# Patient Record
Sex: MALE | Race: WHITE | NOT HISPANIC OR LATINO | Employment: OTHER | URBAN - NONMETROPOLITAN AREA
[De-identification: names, ages, dates, MRNs, and addresses within clinical notes are randomized per-mention and may not be internally consistent; named-entity substitution may affect disease eponyms.]

---

## 2022-04-01 ENCOUNTER — TELEPHONE (OUTPATIENT)
Dept: FAMILY MEDICINE CLINIC | Facility: CLINIC | Age: 77
End: 2022-04-01

## 2022-04-01 NOTE — TELEPHONE ENCOUNTER
I looked in [facility] he was not seen for a sinus infection at that time. He will need to make an appointment thank you

## 2022-04-01 NOTE — TELEPHONE ENCOUNTER
I contacted patient and advised him that he needed to be seen before medication would be sent in.   He said he would try to come to the office tomorrow for the walk in clinic

## 2022-04-01 NOTE — TELEPHONE ENCOUNTER
Patient is calling requesting an antibiotic be sent in for a sinus infection, patient states he was in office beginning of February and saw Dr. Olivarez for the same issue.     Patient pharmacy is [facility].

## 2022-04-02 ENCOUNTER — OFFICE VISIT (OUTPATIENT)
Dept: FAMILY MEDICINE CLINIC | Facility: CLINIC | Age: 77
End: 2022-04-02

## 2022-04-02 VITALS
OXYGEN SATURATION: 97 % | BODY MASS INDEX: 37.64 KG/M2 | HEART RATE: 60 BPM | HEIGHT: 73 IN | SYSTOLIC BLOOD PRESSURE: 134 MMHG | DIASTOLIC BLOOD PRESSURE: 70 MMHG | WEIGHT: 284 LBS

## 2022-04-02 DIAGNOSIS — J01.10 SUBACUTE FRONTAL SINUSITIS: Primary | ICD-10-CM

## 2022-04-02 DIAGNOSIS — R05.9 COUGH: ICD-10-CM

## 2022-04-02 PROCEDURE — 99213 OFFICE O/P EST LOW 20 MIN: CPT | Performed by: FAMILY MEDICINE

## 2022-04-02 RX ORDER — ICOSAPENT ETHYL 1000 MG/1
2 CAPSULE ORAL 2 TIMES DAILY
COMMUNITY
Start: 2022-03-10 | End: 2022-10-05 | Stop reason: SDUPTHER

## 2022-04-02 RX ORDER — DOXYCYCLINE 100 MG/1
100 CAPSULE ORAL 2 TIMES DAILY
Qty: 20 CAPSULE | Refills: 0 | Status: SHIPPED | OUTPATIENT
Start: 2022-04-02 | End: 2022-10-05

## 2022-04-02 RX ORDER — DEXTROMETHORPHAN HYDROBROMIDE AND PROMETHAZINE HYDROCHLORIDE 15; 6.25 MG/5ML; MG/5ML
5 SOLUTION ORAL 4 TIMES DAILY PRN
Qty: 150 ML | Refills: 2 | Status: SHIPPED | OUTPATIENT
Start: 2022-04-02 | End: 2022-10-05

## 2022-04-02 RX ORDER — CARVEDILOL 25 MG/1
1 TABLET ORAL 2 TIMES DAILY
COMMUNITY
Start: 2022-03-28

## 2022-04-02 RX ORDER — ROSUVASTATIN CALCIUM 10 MG/1
1 TABLET, COATED ORAL DAILY
COMMUNITY
Start: 2022-02-01 | End: 2022-09-23 | Stop reason: SDUPTHER

## 2022-04-02 RX ORDER — METHYLPREDNISOLONE 4 MG/1
TABLET ORAL
Qty: 19 TABLET | Refills: 0 | Status: SHIPPED | OUTPATIENT
Start: 2022-04-02 | End: 2022-04-12

## 2022-04-02 RX ORDER — LOSARTAN POTASSIUM 100 MG/1
1 TABLET ORAL DAILY
COMMUNITY
Start: 2022-03-28

## 2022-04-02 NOTE — PROGRESS NOTES
"Chief Complaint  Cough (Patient reports having cough and congestion x2 months.)    Subjective          Werner Brower presents to [facility]  Patient reports he had a cough for about 6 weeks.  He was recently patient treated with some antibiotics and he states he did not get better.  He states if anything his cough is actually got worse and states that he is having now some wheezing with it as well it is really worse when he lays down at night at times or when he exerts himself.      Objective   Vital Signs:   /70 (BP Location: Left arm, Patient Position: Sitting, Cuff Size: Adult)   Pulse 60   Ht 185.4 cm (73\")   Wt 129 kg (284 lb)   SpO2 97%   BMI 37.47 kg/m²     Body mass index is 37.47 kg/m².    Review of Systems   HENT: Positive for rhinorrhea and sinus pressure.    Respiratory: Positive for cough and wheezing.        Past History:  Medical History: has no past medical history on file.   Surgical History: has no past surgical history on file.         Current Outpatient Medications:   •  rosuvastatin (CRESTOR) 10 MG tablet, Take 1 tablet by mouth Daily., Disp: , Rfl:   •  carvedilol (COREG) 25 MG tablet, Take 2 tablets by mouth 2 (Two) Times a Day., Disp: , Rfl:   •  cyanocobalamin (VITAMIN B-12) 1000 MCG tablet, Take 1 tablet by mouth Daily., Disp: , Rfl:   •  doxycycline (MONODOX) 100 MG capsule, Take 1 capsule by mouth 2 (Two) Times a Day., Disp: 20 capsule, Rfl: 0  •  losartan (COZAAR) 100 MG tablet, Take 1 tablet by mouth Daily., Disp: , Rfl:   •  methylPREDNISolone (MEDROL) 4 MG tablet, Take 3 tablets by mouth Daily for 3 days, THEN 2 tablets Daily for 3 days, THEN 1 tablet Daily for 4 days., Disp: 19 tablet, Rfl: 0  •  promethazine-dextromethorphan (PROMETHAZINE-DM) 6.25-15 MG/5ML solution, Take 5 mL by mouth 4 (Four) Times a Day As Needed for Cough., Disp: 150 mL, Rfl: 2  •  Vascepa 1 g capsule capsule, Take 2 capsules by mouth 2 (Two) Times a Day., Disp: , " Rfl:     Allergies: Patient has no known allergies.    Physical Exam  Vitals reviewed.   Constitutional:       Appearance: Normal appearance.   HENT:      Head: Normocephalic.      Comments: Patient has cobblestoning and irritation to the throat     Right Ear: Tympanic membrane, ear canal and external ear normal.      Left Ear: Tympanic membrane, ear canal and external ear normal.      Nose: Nose normal.      Mouth/Throat:      Pharynx: Oropharynx is clear.   Eyes:      Pupils: Pupils are equal, round, and reactive to light.   Cardiovascular:      Rate and Rhythm: Normal rate and regular rhythm.      Pulses: Normal pulses.   Pulmonary:      Effort: Pulmonary effort is normal.      Breath sounds: Normal breath sounds.      Comments: Patient has wheeze and a croupy cough  Abdominal:      General: Abdomen is flat. Bowel sounds are normal.      Palpations: Abdomen is soft.   Musculoskeletal:         General: Normal range of motion.   Skin:     General: Skin is warm and dry.   Neurological:      General: No focal deficit present.      Mental Status: He is alert and oriented to person, place, and time.          Result Review :                   Assessment and Plan    Diagnoses and all orders for this visit:    1. Subacute frontal sinusitis (Primary)  Comments:  Will start doxycycline Medrol taper and Promethazine DM cough medications.  Monitor if he gets worse return  Orders:  -     methylPREDNISolone (MEDROL) 4 MG tablet; Take 3 tablets by mouth Daily for 3 days, THEN 2 tablets Daily for 3 days, THEN 1 tablet Daily for 4 days.  Dispense: 19 tablet; Refill: 0  -     doxycycline (MONODOX) 100 MG capsule; Take 1 capsule by mouth 2 (Two) Times a Day.  Dispense: 20 capsule; Refill: 0    2. Cough  Comments:  Reports he has little reactive airway disease sometimes from allergies in the spring and when he gets sick.  We will start his present medications and monitor   Orders:  -     promethazine-dextromethorphan  (PROMETHAZINE-DM) 6.25-15 MG/5ML solution; Take 5 mL by mouth 4 (Four) Times a Day As Needed for Cough.  Dispense: 150 mL; Refill: 2              Follow Up   No follow-ups on file.  Patient was given instructions and counseling regarding his condition or for health maintenance advice. Please see specific information pulled into the AVS if appropriate.     Enzo Garcia MD

## 2022-08-11 RX ORDER — ICOSAPENT ETHYL 1000 MG/1
CAPSULE ORAL
Qty: 120 CAPSULE | Refills: 5 | OUTPATIENT
Start: 2022-08-11

## 2022-09-23 RX ORDER — ROSUVASTATIN CALCIUM 10 MG/1
10 TABLET, COATED ORAL DAILY
Qty: 30 TABLET | Refills: 1 | Status: SHIPPED | OUTPATIENT
Start: 2022-09-23 | End: 2022-10-05 | Stop reason: SDUPTHER

## 2022-09-23 NOTE — TELEPHONE ENCOUNTER
Caller: Werner Brower    Relationship: Self    Best call back number: [phone]    Requested Prescriptions:   Requested Prescriptions     Pending Prescriptions Disp Refills   • rosuvastatin (CRESTOR) 10 MG tablet 90 tablet      Sig: Take 1 tablet by mouth Daily.      Pharmacy where request should be sent: [facility]/PHARMACY #[id number] [facility] [address] - [phone]  - [phone]     Does the patient have less than a 3 day supply:  [] Yes  [x] No    Driss Dick Rep   09/23/22 10:24 EDT

## 2022-10-05 ENCOUNTER — OFFICE VISIT (OUTPATIENT)
Dept: FAMILY MEDICINE CLINIC | Facility: CLINIC | Age: 77
End: 2022-10-05

## 2022-10-05 VITALS
DIASTOLIC BLOOD PRESSURE: 60 MMHG | WEIGHT: 264.9 LBS | HEART RATE: 64 BPM | BODY MASS INDEX: 35.11 KG/M2 | HEIGHT: 73 IN | SYSTOLIC BLOOD PRESSURE: 156 MMHG | OXYGEN SATURATION: 98 %

## 2022-10-05 DIAGNOSIS — I10 ESSENTIAL HYPERTENSION: Primary | ICD-10-CM

## 2022-10-05 DIAGNOSIS — E78.2 MIXED HYPERLIPIDEMIA: ICD-10-CM

## 2022-10-05 PROBLEM — Z79.899 HIGH RISK MEDICATION USE: Status: ACTIVE | Noted: 2022-10-05

## 2022-10-05 PROBLEM — Z98.890 HISTORY OF COLONOSCOPY: Status: ACTIVE | Noted: 2022-10-05

## 2022-10-05 PROBLEM — M17.0 PRIMARY OSTEOARTHRITIS OF BOTH KNEES: Status: ACTIVE | Noted: 2022-10-05

## 2022-10-05 PROBLEM — N40.1 BPH ASSOCIATED WITH NOCTURIA: Status: ACTIVE | Noted: 2022-10-05

## 2022-10-05 PROBLEM — R35.1 BPH ASSOCIATED WITH NOCTURIA: Status: ACTIVE | Noted: 2022-10-05

## 2022-10-05 PROCEDURE — 99214 OFFICE O/P EST MOD 30 MIN: CPT | Performed by: PHYSICIAN ASSISTANT

## 2022-10-05 PROCEDURE — 36415 COLL VENOUS BLD VENIPUNCTURE: CPT | Performed by: PHYSICIAN ASSISTANT

## 2022-10-05 RX ORDER — ROSUVASTATIN CALCIUM 10 MG/1
10 TABLET, COATED ORAL DAILY
Qty: 30 TABLET | Refills: 5 | Status: SHIPPED | OUTPATIENT
Start: 2022-10-05 | End: 2023-01-10 | Stop reason: SDUPTHER

## 2022-10-05 RX ORDER — ICOSAPENT ETHYL 1000 MG/1
2 CAPSULE ORAL 2 TIMES DAILY
Qty: 120 CAPSULE | Refills: 5 | Status: SHIPPED | OUTPATIENT
Start: 2022-10-05 | End: 2023-01-10 | Stop reason: SDUPTHER

## 2022-10-05 NOTE — PROGRESS NOTES
"Chief Complaint  Med Refill (Needs cholesterol meds refilled. )    Subjective          Werner Brower presents to [facility]  History of Present Illness   comes in today needing refills of his cholesterol medications and his \"routine blood work.\"  He states that he is feeling fine has no specific complaints today.    Objective   Vital Signs:   /60 (BP Location: Right arm)   Pulse 64   Ht 185.4 cm (73\")   Wt 120 kg (264 lb 14.4 oz)   SpO2 98%   BMI 34.95 kg/m²     Body mass index is 34.95 kg/m².    Review of Systems   Constitutional: Negative.  Negative for fatigue.   HENT: Negative.    Eyes: Negative.  Negative for blurred vision.   Respiratory: Negative.  Negative for cough, chest tightness and shortness of breath.    Cardiovascular: Negative.  Negative for chest pain, palpitations and leg swelling.   Gastrointestinal: Negative.  Negative for abdominal pain, constipation, diarrhea, nausea and vomiting.   Endocrine: Negative.    Genitourinary: Negative.    Musculoskeletal: Negative.    Skin: Negative.    Allergic/Immunologic: Negative.    Neurological: Negative.  Negative for dizziness, tremors and headache.   Hematological: Negative.    Psychiatric/Behavioral: Negative.  Negative for depressed mood. The patient is not nervous/anxious.        Past History:  Medical History: has a past medical history of Arthritis, Back pain, BPH associated with nocturia, Condition not found, Essential hypertension, Gallbladder problem, Gonarthrosis, H/O colonoscopy, High risk medication use, History of circumcision, Migraine headache, and Skin problem.   Surgical History: has a past surgical history that includes Cholecystectomy and Colonoscopy (02/24/2010).   Family History: family history includes Hearing loss in his father and mother.   Social History: reports that he has never smoked. He has never used smokeless tobacco. He reports that he does not drink alcohol and does not use " drugs.      Current Outpatient Medications:   •  carvedilol (COREG) 25 MG tablet, Take 1 tablet by mouth 2 (Two) Times a Day., Disp: , Rfl:   •  cyanocobalamin (VITAMIN B-12) 1000 MCG tablet, Take 1 tablet by mouth Daily., Disp: , Rfl:   •  losartan (COZAAR) 100 MG tablet, Take 1 tablet by mouth Daily., Disp: , Rfl:   •  rosuvastatin (CRESTOR) 10 MG tablet, Take 1 tablet by mouth Daily., Disp: 30 tablet, Rfl: 5  •  Vascepa 1 g capsule capsule, Take 2 g by mouth 2 (Two) Times a Day. Dispense as written for severe hypertriglyceridemia, Disp: 120 capsule, Rfl: 5    Allergies: Patient has no known allergies.    Physical Exam  Vitals reviewed.   Constitutional:       Appearance: Normal appearance.   Cardiovascular:      Rate and Rhythm: Normal rate and regular rhythm.      Heart sounds: Normal heart sounds.   Pulmonary:      Effort: Pulmonary effort is normal.      Breath sounds: Normal breath sounds.   Abdominal:      General: Bowel sounds are normal.      Palpations: Abdomen is soft.   Musculoskeletal:         General: Normal range of motion.   Neurological:      General: No focal deficit present.      Mental Status: He is alert and oriented to person, place, and time.   Psychiatric:         Mood and Affect: Mood normal.          Result Review :                   Assessment and Plan    Diagnoses and all orders for this visit:    1. Essential hypertension (Primary)  Assessment & Plan:   Hypertension is well controlled, his cardiologist refills these meds, no changes. Routine screening labs ordered. Further recommendations will depend upon those results.     Orders:  -     CBC & Differential; Future  -     Comprehensive Metabolic Panel; Future  -     CBC & Differential  -     Comprehensive Metabolic Panel    2. Mixed hyperlipidemia  Assessment & Plan:  Continue present care no changes meds refilled.Routine screening labs ordered. Further recommendations will depend upon those results.     Orders:  -     Lipid Panel;  Future  -     TSH; Future  -     Lipid Panel  -     TSH    Other orders  -     Vascepa 1 g capsule capsule; Take 2 g by mouth 2 (Two) Times a Day. Dispense as written for severe hypertriglyceridemia  Dispense: 120 capsule; Refill: 5  -     rosuvastatin (CRESTOR) 10 MG tablet; Take 1 tablet by mouth Daily.  Dispense: 30 tablet; Refill: 5      Follow Up   Return in about 6 months (around 4/5/2023) for Recheck.  Patient was given instructions and counseling regarding his condition or for health maintenance advice. Please see specific information pulled into the AVS if appropriate.     Missy Sylvester PA-C

## 2022-10-06 LAB
ALBUMIN SERPL-MCNC: 4.3 G/DL (ref 3.7–4.7)
ALBUMIN/GLOB SERPL: 1.5 {RATIO} (ref 1.2–2.2)
ALP SERPL-CCNC: 56 IU/L (ref 44–121)
ALT SERPL-CCNC: 20 IU/L (ref 0–44)
AST SERPL-CCNC: 19 IU/L (ref 0–40)
BASOPHILS # BLD AUTO: 0.1 X10E3/UL (ref 0–0.2)
BASOPHILS NFR BLD AUTO: 1 %
BILIRUB SERPL-MCNC: 0.4 MG/DL (ref 0–1.2)
BUN SERPL-MCNC: 20 MG/DL (ref 8–27)
BUN/CREAT SERPL: 18 (ref 10–24)
CALCIUM SERPL-MCNC: 9.5 MG/DL (ref 8.6–10.2)
CHLORIDE SERPL-SCNC: 101 MMOL/L (ref 96–106)
CHOLEST SERPL-MCNC: 113 MG/DL (ref 100–199)
CO2 SERPL-SCNC: 23 MMOL/L (ref 20–29)
CREAT SERPL-MCNC: 1.14 MG/DL (ref 0.76–1.27)
EGFRCR SERPLBLD CKD-EPI 2021: 67 ML/MIN/1.73
EOSINOPHIL # BLD AUTO: 0.5 X10E3/UL (ref 0–0.4)
EOSINOPHIL NFR BLD AUTO: 6 %
ERYTHROCYTE [DISTWIDTH] IN BLOOD BY AUTOMATED COUNT: 12.8 % (ref 11.6–15.4)
GLOBULIN SER CALC-MCNC: 2.9 G/DL (ref 1.5–4.5)
GLUCOSE SERPL-MCNC: 154 MG/DL (ref 70–99)
HCT VFR BLD AUTO: 37.7 % (ref 37.5–51)
HDLC SERPL-MCNC: 37 MG/DL
HGB BLD-MCNC: 13.3 G/DL (ref 13–17.7)
IMM GRANULOCYTES # BLD AUTO: 0 X10E3/UL (ref 0–0.1)
IMM GRANULOCYTES NFR BLD AUTO: 0 %
LDLC SERPL CALC-MCNC: 52 MG/DL (ref 0–99)
LYMPHOCYTES # BLD AUTO: 3.1 X10E3/UL (ref 0.7–3.1)
LYMPHOCYTES NFR BLD AUTO: 38 %
MCH RBC QN AUTO: 33 PG (ref 26.6–33)
MCHC RBC AUTO-ENTMCNC: 35.3 G/DL (ref 31.5–35.7)
MCV RBC AUTO: 94 FL (ref 79–97)
MONOCYTES # BLD AUTO: 0.9 X10E3/UL (ref 0.1–0.9)
MONOCYTES NFR BLD AUTO: 11 %
NEUTROPHILS # BLD AUTO: 3.5 X10E3/UL (ref 1.4–7)
NEUTROPHILS NFR BLD AUTO: 44 %
PLATELET # BLD AUTO: 356 X10E3/UL (ref 150–450)
POTASSIUM SERPL-SCNC: 4.5 MMOL/L (ref 3.5–5.2)
PROT SERPL-MCNC: 7.2 G/DL (ref 6–8.5)
RBC # BLD AUTO: 4.03 X10E6/UL (ref 4.14–5.8)
SODIUM SERPL-SCNC: 137 MMOL/L (ref 134–144)
TRIGL SERPL-MCNC: 140 MG/DL (ref 0–149)
TSH SERPL DL<=0.005 MIU/L-ACNC: 1.95 UIU/ML (ref 0.45–4.5)
VLDLC SERPL CALC-MCNC: 24 MG/DL (ref 5–40)
WBC # BLD AUTO: 8 X10E3/UL (ref 3.4–10.8)

## 2022-10-10 ENCOUNTER — TELEPHONE (OUTPATIENT)
Dept: FAMILY MEDICINE CLINIC | Facility: CLINIC | Age: 77
End: 2022-10-10

## 2022-10-10 NOTE — PROGRESS NOTES
Tried to call Pt, unable to reach him or leave VM bc his VM has not been set up. If pt calls back please give message below. OK for HUB to read

## 2022-11-28 ENCOUNTER — TRANSITIONAL CARE MANAGEMENT TELEPHONE ENCOUNTER (OUTPATIENT)
Dept: CALL CENTER | Facility: HOSPITAL | Age: 77
End: 2022-11-28

## 2022-11-28 ENCOUNTER — READMISSION MANAGEMENT (OUTPATIENT)
Dept: CALL CENTER | Facility: HOSPITAL | Age: 77
End: 2022-11-28

## 2022-11-28 ENCOUNTER — TELEPHONE (OUTPATIENT)
Dept: CASE MANAGEMENT | Facility: OTHER | Age: 77
End: 2022-11-28

## 2022-11-28 NOTE — OUTREACH NOTE
Prep Survey    Flowsheet Row Responses   [facility] facility patient discharged from? Non-BH   Is LACE score < 7 ? Non-BH Discharge   Emergency Room discharge w/ pulse ox? No   Eligibility [facility]   Date of Admission 11/22/22   Date of Discharge 11/25/22   Discharge Disposition Home or Self Care   Discharge diagnosis Unknown   Does the patient have one of the following disease processes/diagnoses(primary or secondary)? Other   Prep survey completed? Yes          PEÑA CHATMAN - Registered Nurse

## 2022-11-28 NOTE — OUTREACH NOTE
Call Center TCM Note    Flowsheet Row Responses   [facility] patient discharged from? Non-   Does the patient have one of the following disease processes/diagnoses(primary or secondary)? Other   TCM attempt successful? Yes   Call start time 1415   Call end time 1419   Discharge diagnosis Unknown   Meds reviewed with patient/caregiver? Yes   Is the patient having any side effects they believe may be caused by any medication additions or changes? No   Does the patient have all medications ordered at discharge? Yes   Is the patient taking all medications as directed (includes completed medication regime)? Yes   Comments Pt has an already scheduled appt on 12/12/22 @8:15am he would like to use as his hospital d/c f/u   Does the patient have an appointment with their PCP within 7 days of discharge? Yes   Has home health visited the patient within 72 hours of discharge? N/A   Psychosocial issues? No   Did the patient receive a copy of their discharge instructions? Yes   Nursing interventions Reviewed instructions with patient   What is the patient's perception of their health status since discharge? Improving   Is the patient/caregiver able to teach back the hierarchy of who to call/visit for symptoms/problems? PCP, Specialist, Home health nurse, Urgent Care, ED, 911 Yes   TCM call completed? Yes   Call end time 1419   Would this patient benefit from a Referral to Amb Social Work? No   Is the patient interested in additional calls from an ambulatory ?  NOTE:  applies to high risk patients requiring additional follow-up. No          Sailaja Kat RN    11/28/2022, 14:20 EST

## 2022-11-28 NOTE — TELEPHONE ENCOUNTER
Notification to call center patient hospitalization [facility] 11/22/22 - 11/25/22

## 2022-12-12 ENCOUNTER — OFFICE VISIT (OUTPATIENT)
Dept: FAMILY MEDICINE CLINIC | Facility: CLINIC | Age: 77
End: 2022-12-12

## 2022-12-12 VITALS
BODY MASS INDEX: 32.34 KG/M2 | HEIGHT: 73 IN | WEIGHT: 244 LBS | DIASTOLIC BLOOD PRESSURE: 62 MMHG | SYSTOLIC BLOOD PRESSURE: 118 MMHG

## 2022-12-12 DIAGNOSIS — N40.1 BPH ASSOCIATED WITH NOCTURIA: ICD-10-CM

## 2022-12-12 DIAGNOSIS — I27.20 PULMONARY HYPERTENSION: ICD-10-CM

## 2022-12-12 DIAGNOSIS — Z11.59 NEED FOR HEPATITIS C SCREENING TEST: ICD-10-CM

## 2022-12-12 DIAGNOSIS — R35.1 BPH ASSOCIATED WITH NOCTURIA: ICD-10-CM

## 2022-12-12 DIAGNOSIS — E78.2 MIXED HYPERLIPIDEMIA: ICD-10-CM

## 2022-12-12 DIAGNOSIS — K21.9 GERD WITHOUT ESOPHAGITIS: ICD-10-CM

## 2022-12-12 DIAGNOSIS — I10 ESSENTIAL HYPERTENSION: ICD-10-CM

## 2022-12-12 DIAGNOSIS — R73.9 HYPERGLYCEMIA: ICD-10-CM

## 2022-12-12 DIAGNOSIS — E87.79 VOLUME OVERLOAD STATE OF HEART: ICD-10-CM

## 2022-12-12 DIAGNOSIS — I50.21 ACUTE SYSTOLIC CHF (CONGESTIVE HEART FAILURE): Primary | ICD-10-CM

## 2022-12-12 DIAGNOSIS — Z12.5 PROSTATE CANCER SCREENING: ICD-10-CM

## 2022-12-12 PROCEDURE — 36415 COLL VENOUS BLD VENIPUNCTURE: CPT | Performed by: FAMILY MEDICINE

## 2022-12-12 PROCEDURE — 99214 OFFICE O/P EST MOD 30 MIN: CPT | Performed by: FAMILY MEDICINE

## 2022-12-12 RX ORDER — ASPIRIN 81 MG/1
81 TABLET ORAL DAILY
Start: 2022-12-12

## 2022-12-12 RX ORDER — TAMSULOSIN HYDROCHLORIDE 0.4 MG/1
1 CAPSULE ORAL
COMMUNITY
Start: 2022-11-25

## 2022-12-12 RX ORDER — ANTIOX #8/OM3/DHA/EPA/LUT/ZEAX 250-2.5 MG
CAPSULE ORAL DAILY
COMMUNITY
Start: 2022-11-12

## 2022-12-12 NOTE — ASSESSMENT & PLAN NOTE
Reviewed his work-up and discussed further needed work-up and referral.    We will check a BNP with his fasting labs today.  He was not continued on any long-term Lasix given they felt a lot of his issues were related to fluid overload with IV fluids given during his admission at [facility] for prostate infection.    We discussed elevated pulmonary pressures seen with his echocardiogram and recommendation for sleep apnea work-up.    Consultation being set up with Dr. Schmitt with [facility]ology along with sleep medicine to work-up suspected sleep apnea.    Continue low-dose aspirin, carvedilol, losartan, Crestor, and Vascepa.    Continue Flomax for BPH.    Recheck in 1 months for annual wellness visit or sooner if problems arise.

## 2022-12-12 NOTE — ASSESSMENT & PLAN NOTE
Discussed elevated pulmonary pressures on recent echocardiogram.    Scheduling consultation with cardiology and sleep medicine for further work-up and treatment recommendations

## 2022-12-12 NOTE — ASSESSMENT & PLAN NOTE
Symptoms resolved with brief treatment with Lasix for suspected volume overload related inpatient admission and overaggressive IV fluids.    We did discuss further work-up and he is being set up with cardiology for follow-up along with sleep medicine to work-up suspected sleep apnea

## 2022-12-12 NOTE — PROGRESS NOTES
"Chief Complaint  Hospital Follow Up Visit    Subjective    History of Present Illness:  Werner Brower is a 77 y.o. male who presents today for hospital follow-up.    He did have an admission at [facility] November 25, 2022 through November 26, 2022 for shortness of breath and weight gain with diagnosis of congestive heart failure.    His BNP level was elevated at 3264.  He was treated with Lasix IV and discharged home on Lasix for new diagnosis of heart failure.  His chest x-ray showed mild vascular engorgement.    He was only given Lasix for total of 3 days.    He was instructed to continue his Coreg at 25 mg twice daily, low-dose aspirin daily, losartan, vitamin B12, Crestor 10 mg.    His echocardiogram showed normal ejection fraction with left ventricular hypertrophy and at least moderate pulmonary hypertension.    They did not feel that he received a lot of IV fluids with an earlier admission at [facility] that triggered his volume overload.    Did also have some mild AST and ALT elevation at the time of his admission.    Fasting blood work done and would like hep C screening along with screening PSA level.    He is willing to see cardiology given his volume overload and clinical presentation with congestive heart failure along with work-up for possible sleep apnea given his elevated pulmonary pressures reported from echocardiogram done during brief [facility]'s admission.        Objective   Vital Signs:   /62 (BP Location: Left arm, Patient Position: Sitting, Cuff Size: Adult)   Ht 185.4 cm (73\")   Wt 111 kg (244 lb)   BMI 32.19 kg/m²     Review of Systems   Constitutional: Negative for appetite change, chills and fever.   HENT: Negative for hearing loss.    Eyes: Negative for blurred vision.   Respiratory: Negative for chest tightness.    Cardiovascular: Negative for chest pain.   Gastrointestinal: Negative for abdominal pain.   Musculoskeletal: Negative for " gait problem.   Skin: Negative for rash.   Psychiatric/Behavioral: Negative for depressed mood.       Past History:  Medical History: has a past medical history of Arthritis, Back pain, BPH associated with nocturia, Condition not found, Essential hypertension, Gallbladder problem, Gonarthrosis, H/O colonoscopy, High risk medication use, History of circumcision, Migraine headache, and Skin problem.   Surgical History: has a past surgical history that includes Cholecystectomy and Colonoscopy (02/24/2010).   Family History: family history includes Hearing loss in his father and mother.   Social History: reports that he has never smoked. He has never used smokeless tobacco. He reports that he does not drink alcohol and does not use drugs.      Current Outpatient Medications:   •  aspirin 81 MG EC tablet, Take 1 tablet by mouth Daily., Disp: , Rfl:   •  carvedilol (COREG) 25 MG tablet, Take 1 tablet by mouth 2 (Two) Times a Day., Disp: , Rfl:   •  losartan (COZAAR) 100 MG tablet, Take 1 tablet by mouth Daily., Disp: , Rfl:   •  multivitamins-minerals (PRESERVISION AREDS 2) capsule capsule, , Disp: , Rfl:   •  rosuvastatin (CRESTOR) 10 MG tablet, Take 1 tablet by mouth Daily., Disp: 30 tablet, Rfl: 5  •  tamsulosin (FLOMAX) 0.4 MG capsule 24 hr capsule, Take 1 capsule by mouth every night at bedtime., Disp: , Rfl:   •  Vascepa 1 g capsule capsule, Take 2 g by mouth 2 (Two) Times a Day. Dispense as written for severe hypertriglyceridemia, Disp: 120 capsule, Rfl: 5    Allergies: Patient has no known allergies.    Physical Exam  Constitutional:       Appearance: Normal appearance.   HENT:      Head: Normocephalic.      Right Ear: External ear normal.      Left Ear: External ear normal.      Nose: Nose normal.   Eyes:      Pupils: Pupils are equal, round, and reactive to light.   Cardiovascular:      Rate and Rhythm: Normal rate and regular rhythm.      Heart sounds: Normal heart sounds.   Pulmonary:      Effort: Pulmonary  effort is normal.      Breath sounds: Normal breath sounds.   Musculoskeletal:         General: Normal range of motion.      Cervical back: Normal range of motion.   Skin:     General: Skin is warm and dry.   Neurological:      General: No focal deficit present.      Mental Status: He is alert.   Psychiatric:         Mood and Affect: Mood normal.         Behavior: Behavior normal.         Thought Content: Thought content normal.          Result Review                   Assessment and Plan  Diagnoses and all orders for this visit:    1. Acute systolic CHF (congestive heart failure) (HCC) (Primary)  Assessment & Plan:  Reviewed his work-up and discussed further needed work-up and referral.    We will check a BNP with his fasting labs today.  He was not continued on any long-term Lasix given they felt a lot of his issues were related to fluid overload with IV fluids given during his admission at [facility] for prostate infection.    We discussed elevated pulmonary pressures seen with his echocardiogram and recommendation for sleep apnea work-up.    Consultation being set up with Dr. Schmitt with [facility] cardiology along with sleep medicine to work-up suspected sleep apnea.    Continue low-dose aspirin, carvedilol, losartan, Crestor, and Vascepa.    Continue Flomax for BPH.    Recheck in 1 months for annual wellness visit or sooner if problems arise.    Orders:  -     aspirin 81 MG EC tablet; Take 1 tablet by mouth Daily.  -     BNP ([facility] Only); Future  -     Ambulatory Referral to Cardiology  -     BNP ([facility] Only)    2. Pulmonary hypertension (HCC)  Assessment & Plan:  Discussed elevated pulmonary pressures on recent echocardiogram.    Scheduling consultation with cardiology and sleep medicine for further work-up and treatment recommendations    Orders:  -     BNP ([facility] Only); Future  -     Ambulatory Referral to Sleep Medicine  -     Ambulatory Referral to Cardiology  -     BNP ([facility]  Only)    3. Volume overload state of heart  Assessment & Plan:  Symptoms resolved with brief treatment with Lasix for suspected volume overload related inpatient admission and overaggressive IV fluids.    We did discuss further work-up and he is being set up with cardiology for follow-up along with sleep medicine to work-up suspected sleep apnea    Orders:  -     BNP ([facility] Only); Future  -     Ambulatory Referral to Sleep Medicine  -     Ambulatory Referral to Cardiology  -     BNP ([facility] Only)    4. Essential hypertension  Assessment & Plan:  Hypertension is improving with treatment.  Continue current treatment regimen.  Blood pressure will be reassessed at the next regular appointment.    Orders:  -     CBC Auto Differential; Future  -     Comprehensive Metabolic Panel; Future  -     Lipid Panel; Future  -     TSH; Future  -     T4, Free; Future  -     BNP ([facility] Only); Future  -     Ambulatory Referral to Sleep Medicine  -     Ambulatory Referral to Cardiology  -     CBC Auto Differential  -     Comprehensive Metabolic Panel  -     Lipid Panel  -     TSH  -     T4, Free  -     BNP ([facility] Only)    5. Mixed hyperlipidemia  Assessment & Plan:  Lipid abnormalities are improving with treatment.  Pharmacotherapy as ordered.  Lipids will be reassessed in 6 months.    Orders:  -     CBC Auto Differential; Future  -     Comprehensive Metabolic Panel; Future  -     Lipid Panel; Future  -     TSH; Future  -     T4, Free; Future  -     Ambulatory Referral to Cardiology  -     CBC Auto Differential  -     Comprehensive Metabolic Panel  -     Lipid Panel  -     TSH  -     T4, Free    6. BPH associated with nocturia  Assessment & Plan:  Continue Flomax.  Awaiting screening PSA      7. GERD without esophagitis  Assessment & Plan:  Mild flareups therapy stable with Pepcid over-the-counter.  He is using this daily but plans to transition to just as needed      8. Prostate cancer screening  -     PSA Screen; Future  -      PSA Screen    9. Hyperglycemia  -     Hemoglobin A1c; Future  -     Hemoglobin A1c    10. Need for hepatitis C screening test  -     HCV Antibody Rfx To Qnt PCR; Future  -     HCV Antibody Rfx To Qnt PCR                 Follow Up  Return in about 4 weeks (around 1/9/2023) for Medicare Wellness, Med recheck.    Mathew Olivarez MD

## 2022-12-12 NOTE — ASSESSMENT & PLAN NOTE
Mild flareups therapy stable with Pepcid over-the-counter.  He is using this daily but plans to transition to just as needed

## 2022-12-13 LAB
ALBUMIN SERPL-MCNC: 4.1 G/DL (ref 3.7–4.7)
ALBUMIN/GLOB SERPL: 1.1 {RATIO} (ref 1.2–2.2)
ALP SERPL-CCNC: 89 IU/L (ref 44–121)
ALT SERPL-CCNC: 40 IU/L (ref 0–44)
AST SERPL-CCNC: 23 IU/L (ref 0–40)
BASOPHILS # BLD AUTO: 0.1 X10E3/UL (ref 0–0.2)
BASOPHILS NFR BLD AUTO: 1 %
BILIRUB SERPL-MCNC: 0.5 MG/DL (ref 0–1.2)
BUN SERPL-MCNC: 14 MG/DL (ref 8–27)
BUN/CREAT SERPL: 13 (ref 10–24)
CALCIUM SERPL-MCNC: 9.6 MG/DL (ref 8.6–10.2)
CHLORIDE SERPL-SCNC: 99 MMOL/L (ref 96–106)
CHOLEST SERPL-MCNC: 121 MG/DL (ref 100–199)
CO2 SERPL-SCNC: 21 MMOL/L (ref 20–29)
CREAT SERPL-MCNC: 1.04 MG/DL (ref 0.76–1.27)
EGFRCR SERPLBLD CKD-EPI 2021: 74 ML/MIN/1.73
EOSINOPHIL # BLD AUTO: 0.3 X10E3/UL (ref 0–0.4)
EOSINOPHIL NFR BLD AUTO: 4 %
ERYTHROCYTE [DISTWIDTH] IN BLOOD BY AUTOMATED COUNT: 12.7 % (ref 11.6–15.4)
GLOBULIN SER CALC-MCNC: 3.7 G/DL (ref 1.5–4.5)
GLUCOSE SERPL-MCNC: 117 MG/DL (ref 70–99)
HBA1C MFR BLD: 6.2 % (ref 4.8–5.6)
HCT VFR BLD AUTO: 37.5 % (ref 37.5–51)
HCV AB S/CO SERPL IA: <0.1 S/CO RATIO (ref 0–0.9)
HCV AB SERPL QL IA: NORMAL
HDLC SERPL-MCNC: 33 MG/DL
HGB BLD-MCNC: 12.8 G/DL (ref 13–17.7)
IMM GRANULOCYTES # BLD AUTO: 0 X10E3/UL (ref 0–0.1)
IMM GRANULOCYTES NFR BLD AUTO: 0 %
LDLC SERPL CALC-MCNC: 61 MG/DL (ref 0–99)
LYMPHOCYTES # BLD AUTO: 1.9 X10E3/UL (ref 0.7–3.1)
LYMPHOCYTES NFR BLD AUTO: 28 %
MCH RBC QN AUTO: 32.1 PG (ref 26.6–33)
MCHC RBC AUTO-ENTMCNC: 34.1 G/DL (ref 31.5–35.7)
MCV RBC AUTO: 94 FL (ref 79–97)
MONOCYTES # BLD AUTO: 0.9 X10E3/UL (ref 0.1–0.9)
MONOCYTES NFR BLD AUTO: 13 %
NEUTROPHILS # BLD AUTO: 3.7 X10E3/UL (ref 1.4–7)
NEUTROPHILS NFR BLD AUTO: 54 %
PLATELET # BLD AUTO: 437 X10E3/UL (ref 150–450)
POTASSIUM SERPL-SCNC: 4.7 MMOL/L (ref 3.5–5.2)
PROT SERPL-MCNC: 7.8 G/DL (ref 6–8.5)
PSA SERPL-MCNC: 4.7 NG/ML (ref 0–4)
RBC # BLD AUTO: 3.99 X10E6/UL (ref 4.14–5.8)
SODIUM SERPL-SCNC: 135 MMOL/L (ref 134–144)
T4 FREE SERPL-MCNC: 1.34 NG/DL (ref 0.82–1.77)
TRIGL SERPL-MCNC: 159 MG/DL (ref 0–149)
TSH SERPL DL<=0.005 MIU/L-ACNC: 1.93 UIU/ML (ref 0.45–4.5)
VLDLC SERPL CALC-MCNC: 27 MG/DL (ref 5–40)
WBC # BLD AUTO: 6.9 X10E3/UL (ref 3.4–10.8)

## 2022-12-13 NOTE — PROGRESS NOTES
THE MESSAGE BELOW IS ABLE TO BE GIVEN BY THE HUB.  THE HUB MAY SCHEDULE A FOLLOW-UP VISIT FOR THE PATIENT IF INDICATED IN THE MESSAGE BELOW...    Please contact patient with recent lab results:    His comprehensive metabolic panel returned with normal kidney function, normal liver enzymes, and mild blood sugar elevation at 117 in the prediabetes range.  His hemoglobin A1c remains mildly elevated in the prediabetes range at 6.2.  Please have him work on low sugar and low carbohydrate diet with exercise efforts to help prevent the progression to diabetes.    His cholesterol control returned good with mild triglyceride elevation.  Please again have him work on low sugar and low carbohydrate diet with exercise efforts and this should improve both his blood sugar and triglyceride elevation.    His blood count returned with normal white blood count, normal platelet level, and stable hemoglobin and hematocrit.    We are still waiting on some of his additional lab work to return including his thyroid studies, prostate screening, hepatitis C screening, and BNP level for his heart.  We will contact him when those results become available.

## 2022-12-14 DIAGNOSIS — R97.20 ELEVATED PSA: Primary | ICD-10-CM

## 2022-12-14 NOTE — PROGRESS NOTES
THE MESSAGE BELOW IS ABLE TO BE GIVEN BY THE HUB.  THE HUB MAY SCHEDULE A FOLLOW-UP VISIT FOR THE PATIENT IF INDICATED IN THE MESSAGE BELOW...    Please call patient with lab results:    His PSA level for prostate cancer screening returned slightly elevated at 4.7.  The normal ranges up to 4.0.  Although it is not uncommon for a PSA level to be above 4 at his age... I would recommend a consultation with urology to discuss further work-up and monitoring options.  I put an order in his chart for consultation with urology and they should be contacting him with his appointment when scheduled.    His hepatitis C screening blood work returned negative.    His thyroid blood work returned normal.

## 2022-12-15 ENCOUNTER — TELEPHONE (OUTPATIENT)
Dept: FAMILY MEDICINE CLINIC | Facility: CLINIC | Age: 77
End: 2022-12-15

## 2022-12-15 LAB — BNP SERPL-MCNC: 63.5 PG/ML (ref 0–100)

## 2022-12-15 NOTE — TELEPHONE ENCOUNTER
CALLED PATIENT UNABLE TO LEAVE VOICEMAIL, PLEASE SEE MESSAGE BELOW    THANK YOU    HUB CAN READ    ----- Message from Mathew Olivarez MD sent at 12/14/2022  7:26 AM EST -----  THE MESSAGE BELOW IS ABLE TO BE GIVEN BY THE HUB.  THE HUB MAY SCHEDULE A FOLLOW-UP VISIT FOR THE PATIENT IF INDICATED IN THE MESSAGE BELOW...    Please call patient with lab results:    His PSA level for prostate cancer screening returned slightly elevated at 4.7.  The normal ranges up to 4.0.  Although it is not uncommon for a PSA level to be above 4 at his age... I would recommend a consultation with urology to discuss further work-up and monitoring options.  I put an order in his chart for consultation with urology and they should be contacting him with his appointment when scheduled.    His hepatitis C screening blood work returned negative.    His thyroid blood work returned normal.

## 2022-12-15 NOTE — TELEPHONE ENCOUNTER
[facility] CAN READ     ----- Message from Mathew Olivarez MD sent at 12/14/2022  7:26 AM EST -----  THE MESSAGE BELOW IS ABLE TO BE GIVEN BY THE [facility].  THE [facility] MAY SCHEDULE A FOLLOW-UP VISIT FOR THE PATIENT IF INDICATED IN THE MESSAGE BELOW...     Please call patient with lab results:     His PSA level for prostate cancer screening returned slightly elevated at 4.7.  The normal ranges up to 4.0.  Although it is not uncommon for a PSA level to be above 4 at his age... I would recommend a consultation with urology to discuss further work-up and monitoring options.  I put an order in his chart for consultation with urology and they should be contacting him with his appointment when scheduled.     His hepatitis C screening blood work returned negative.     His thyroid blood work returned normal.    [facility] RELAYED MESSAGE  THERE IS AN APPOINTMENT WITH DR GARCIA, UROLOGY TOMORROW.

## 2023-01-10 ENCOUNTER — OFFICE VISIT (OUTPATIENT)
Dept: FAMILY MEDICINE CLINIC | Facility: CLINIC | Age: 78
End: 2023-01-10
Payer: MEDICARE

## 2023-01-10 VITALS
WEIGHT: 252 LBS | BODY MASS INDEX: 33.4 KG/M2 | DIASTOLIC BLOOD PRESSURE: 70 MMHG | HEIGHT: 73 IN | HEART RATE: 57 BPM | OXYGEN SATURATION: 98 % | SYSTOLIC BLOOD PRESSURE: 130 MMHG

## 2023-01-10 DIAGNOSIS — N40.1 BPH ASSOCIATED WITH NOCTURIA: Chronic | ICD-10-CM

## 2023-01-10 DIAGNOSIS — R73.9 HYPERGLYCEMIA: ICD-10-CM

## 2023-01-10 DIAGNOSIS — Z00.00 GENERAL MEDICAL EXAM: Primary | ICD-10-CM

## 2023-01-10 DIAGNOSIS — R35.1 BPH ASSOCIATED WITH NOCTURIA: Chronic | ICD-10-CM

## 2023-01-10 DIAGNOSIS — I27.20 PULMONARY HYPERTENSION: ICD-10-CM

## 2023-01-10 DIAGNOSIS — I50.21 ACUTE SYSTOLIC CHF (CONGESTIVE HEART FAILURE): ICD-10-CM

## 2023-01-10 DIAGNOSIS — I10 ESSENTIAL HYPERTENSION: Chronic | ICD-10-CM

## 2023-01-10 DIAGNOSIS — R97.20 ELEVATED PSA: ICD-10-CM

## 2023-01-10 DIAGNOSIS — E78.2 MIXED HYPERLIPIDEMIA: Chronic | ICD-10-CM

## 2023-01-10 PROCEDURE — 1170F FXNL STATUS ASSESSED: CPT | Performed by: FAMILY MEDICINE

## 2023-01-10 PROCEDURE — 1160F RVW MEDS BY RX/DR IN RCRD: CPT | Performed by: FAMILY MEDICINE

## 2023-01-10 PROCEDURE — G0439 PPPS, SUBSEQ VISIT: HCPCS | Performed by: FAMILY MEDICINE

## 2023-01-10 RX ORDER — ROSUVASTATIN CALCIUM 10 MG/1
10 TABLET, COATED ORAL DAILY
Qty: 90 TABLET | Refills: 1 | Status: SHIPPED | OUTPATIENT
Start: 2023-01-10

## 2023-01-10 RX ORDER — ICOSAPENT ETHYL 1000 MG/1
2 CAPSULE ORAL 2 TIMES DAILY
Qty: 360 CAPSULE | Refills: 1 | Status: SHIPPED | OUTPATIENT
Start: 2023-01-10 | End: 2023-02-06

## 2023-01-10 NOTE — ASSESSMENT & PLAN NOTE
Reviewed health maintenance and screening along with vaccination options.  Declines Shingrix.    Encourage advance directive.    Reviewed together fasting lab work with plan to work on diet and exercise given prediabetes and plan to recheck in 4 months with a fasting check on CMP, lipid, and A1c prior to his appointment.    Hemoglobin was slightly low but better than lab work done in the hospital.  We will recheck a CBC with future labs as well.

## 2023-01-10 NOTE — ASSESSMENT & PLAN NOTE
Reviewed labs with A1c at 6.2.  Encourage diet and exercise efforts with plan to recheck at follow-up visit in 4 months

## 2023-01-10 NOTE — ASSESSMENT & PLAN NOTE
Lipid abnormalities are improving with treatment.  Pharmacotherapy as ordered.  Lipids will be reassessed in 6 months.

## 2023-01-10 NOTE — ASSESSMENT & PLAN NOTE
Discussed PSA elevation today.  He is following with urology with plans to recheck PSA in 6 months.  Continue Flomax for BPH

## 2023-01-10 NOTE — ASSESSMENT & PLAN NOTE
Symptoms resolved with brief inpatient treatment at [facility].    Reviewed together recent labs with normal BNP.    He did have evaded pulmonary pressures concerning for pulmonary hypertension and possible sleep apnea.  He does have a sleep apnea study scheduled.    He is set up with cardiology to establish care with [facility].

## 2023-01-10 NOTE — PROGRESS NOTES
QUICK REFERENCE INFORMATION:  The ABCs of the Annual Wellness Visit    Subsequent Medicare Wellness Visit    @[email or web address]@    HEALTH RISK ASSESSMENT    1945    Recent Hospitalizations:  Recently treated at the following:  Other: Nov [facility]. Volume overload/CHF.        Current Medical Providers:  Patient Care Team:  Mathew Olivarez MD as PCP - General (Family Medicine)        Smoking Status:  Social History     Tobacco Use   Smoking Status Never   Smokeless Tobacco Never       Alcohol Consumption:  Social History     Substance and Sexual Activity   Alcohol Use Never       Depression Screen:   PHQ-2/PHQ-9 Depression Screening 1/10/2023   Little Interest or Pleasure in Doing Things 0-->not at all   Feeling Down, Depressed or Hopeless 0-->not at all   PHQ-9: Brief Depression Severity Measure Score 0       Health Habits and Functional and Cognitive Screening:  Functional & Cognitive Status 1/10/2023   Do you have difficulty preparing food and eating? No   Do you have difficulty bathing yourself, getting dressed or grooming yourself? No   Do you have difficulty using the toilet? No   Do you have difficulty moving around from place to place? No   Do you have trouble with steps or getting out of a bed or a chair? Yes   Current Diet Well Balanced Diet   Dental Exam Up to date   Eye Exam Up to date   Exercise (times per week) 7 times per week   Current Exercises Include Walking   Do you need help using the phone?  No   Are you deaf or do you have serious difficulty hearing?  No   Do you need help with transportation? No   Do you need help shopping? No   Do you need help preparing meals?  No   Do you need help with housework?  No   Do you need help with laundry? No   Do you need help taking your medications? No   Do you need help managing money? No   Do you ever drive or ride in a car without wearing a seat belt? No   Have you felt unusual stress, anger or loneliness in the last month? No   Who do you live with? Spouse    If you need help, do you have trouble finding someone available to you? No   Have you been bothered in the last four weeks by sexual problems? No   Do you have difficulty concentrating, remembering or making decisions? No       Fall Risk Screen:  SHRUTI Fall Risk Assessment was completed, and patient is at LOW risk for falls.Assessment completed on:1/10/2023    ACE III MINI        Does the patient have evidence of cognitive impairment? No    Aspirin use counseling: Taking ASA appropriately as indicated    Recent Lab Results:  CMP:  Lab Results   Component Value Date    BUN 14 12/12/2022    CREATININE 1.04 12/12/2022    BCR 13 12/12/2022     12/12/2022    K 4.7 12/12/2022    CO2 21 12/12/2022    CALCIUM 9.6 12/12/2022    PROTENTOTREF 7.8 12/12/2022    ALBUMIN 4.1 12/12/2022    LABGLOBREF 3.7 12/12/2022    LABIL2 1.1 (L) 12/12/2022    BILITOT 0.5 12/12/2022    ALKPHOS 89 12/12/2022    AST 23 12/12/2022    ALT 40 12/12/2022     HbA1c:  Lab Results   Component Value Date    HGBA1C 6.2 (H) 12/12/2022     Microalbumin:  No results found for: MICROALBUR, POCMALB, POCCREAT  Lipid Panel  Lab Results   Component Value Date    TRIG 159 (H) 12/12/2022    HDL 33 (L) 12/12/2022    LDL 61 12/12/2022    AST 23 12/12/2022    ALT 40 12/12/2022       Visual Acuity:  No results found.    Age-appropriate Screening Schedule:  Refer to the list below for future screening recommendations based on patient's age, sex and/or medical conditions. Orders for these recommended tests are listed in the plan section. The patient has been provided with a written plan.    Health Maintenance   Topic Date Due   • LIPID PANEL  12/12/2023   • TDAP/TD VACCINES (2 - Td or Tdap) 05/29/2028   • INFLUENZA VACCINE  Completed   • ZOSTER VACCINE  Discontinued        Subjective   History of Present Illness    Werner Brower is a 77 y.o. male who presents for a Subsequent Wellness Visit.    Here also to follow-up after recent episode of acute volume  overload and heart failure.  He is doing much better since our last visit and does have cardiology consultation scheduled for follow-up.  His BNP returned normal after resolution of his acute volume overload.    He continues on carvedilol, losartan, low-dose aspirin, Crestor, and Vascepa.    Recent labs did show mild A1c elevation and is working on diet and exercise before medications.    He is following with urology given recent PSA elevation at 4.7.  He is now on Flomax for BPH symptoms we will plan to recheck his PSA through urology in 6 months.    He does not have an advance directive on file and we discussed this today and he was given information regarding advance directive.    Declines Shingrix vaccination    CHRONIC CONDITIONS    The following portions of the patient's history were reviewed and updated as appropriate: allergies, current medications, past family history, past medical history, past social history, past surgical history and problem list.    Outpatient Medications Prior to Visit   Medication Sig Dispense Refill   • aspirin 81 MG EC tablet Take 1 tablet by mouth Daily.     • carvedilol (COREG) 25 MG tablet Take 1 tablet by mouth 2 (Two) Times a Day.     • losartan (COZAAR) 100 MG tablet Take 1 tablet by mouth Daily.     • multivitamins-minerals (PRESERVISION AREDS 2) capsule capsule      • tamsulosin (FLOMAX) 0.4 MG capsule 24 hr capsule Take 1 capsule by mouth every night at bedtime.     • rosuvastatin (CRESTOR) 10 MG tablet Take 1 tablet by mouth Daily. 30 tablet 5   • Vascepa 1 g capsule capsule Take 2 g by mouth 2 (Two) Times a Day. Dispense as written for severe hypertriglyceridemia 120 capsule 5     No facility-administered medications prior to visit.       Patient Active Problem List   Diagnosis   • BPH associated with nocturia   • Essential hypertension   • High risk medication use   • History of colonoscopy   • Primary osteoarthritis of both knees   • Mixed hyperlipidemia   • Pulmonary  hypertension (HCC)   • Volume overload state of heart   • GERD without esophagitis   • Prostate cancer screening   • Hyperglycemia   • Need for hepatitis C screening test   • Acute systolic CHF (congestive heart failure) (HCC)   • General medical exam   • Elevated PSA       Advance Care Planning:  ACP discussion was held with the patient during this visit. Patient does not have an advance directive, information provided.    Identification of Risk Factors:  Risk factors include: Advance Directive Discussion  Fall Risk  Glaucoma Risk  Hearing Problem  Prostate Cancer Screening .    Review of Systems   Constitutional: Negative for appetite change, chills, fever and unexpected weight change.   HENT: Negative for hearing loss.    Eyes: Negative for visual disturbance.   Respiratory: Negative for chest tightness, shortness of breath and wheezing.    Cardiovascular: Negative for chest pain, palpitations and leg swelling.   Gastrointestinal: Negative for abdominal pain.   Musculoskeletal: Negative for arthralgias, back pain and gait problem.   Skin: Negative for rash.   Neurological: Negative for dizziness and headaches.   Psychiatric/Behavioral: Negative for agitation and confusion. The patient is not nervous/anxious.        Compared to one year ago, the patient feels his physical health is better.  Compared to one year ago, the patient feels his mental health is better.    Objective     Physical Exam  Constitutional:       Appearance: He is obese.   HENT:      Head: Normocephalic.      Right Ear: External ear normal.      Left Ear: External ear normal.      Nose: Nose normal.   Eyes:      Pupils: Pupils are equal, round, and reactive to light.   Cardiovascular:      Rate and Rhythm: Normal rate and regular rhythm.      Heart sounds: Normal heart sounds.   Pulmonary:      Effort: Pulmonary effort is normal.      Breath sounds: Normal breath sounds.   Musculoskeletal:         General: Normal range of motion.      Cervical  back: Normal range of motion.   Skin:     General: Skin is warm and dry.   Neurological:      General: No focal deficit present.      Mental Status: He is alert.   Psychiatric:         Mood and Affect: Mood normal.         Behavior: Behavior normal.         Thought Content: Thought content normal.          Procedures     Vitals:    01/10/23 1050   BP: 130/70   BP Location: Left arm   Patient Position: Sitting   Cuff Size: Adult   Pulse: 57   SpO2: 98%   Weight: 114 kg (252 lb)   Height: 185.4 cm (73\")       BMI is >= 30 and <35. (Class 1 Obesity). The following options were offered after discussion;: exercise counseling/recommendations and nutrition counseling/recommendations      Lab Results   Component Value Date    WBC 6.9 12/12/2022    HGB 12.8 (L) 12/12/2022    HCT 37.5 12/12/2022    MCV 94 12/12/2022     12/12/2022     Lab Results   Component Value Date    GLUCOSE 117 (H) 12/12/2022    BUN 14 12/12/2022    CREATININE 1.04 12/12/2022    BCR 13 12/12/2022    K 4.7 12/12/2022    CO2 21 12/12/2022    CALCIUM 9.6 12/12/2022    PROTENTOTREF 7.8 12/12/2022    ALBUMIN 4.1 12/12/2022    LABIL2 1.1 (L) 12/12/2022    AST 23 12/12/2022    ALT 40 12/12/2022     Lab Results   Component Value Date    TSH 1.930 12/12/2022     Lab Results   Component Value Date    PSA 4.7 (H) 12/12/2022     Lab Results   Component Value Date    CHLPL 121 12/12/2022    TRIG 159 (H) 12/12/2022    HDL 33 (L) 12/12/2022    LDL 61 12/12/2022       Assessment & Plan   Problem List Items Addressed This Visit        Cardiac and Vasculature    Essential hypertension (Chronic)    Current Assessment & Plan     Hypertension is improving with treatment.  Continue current treatment regimen.  Blood pressure will be reassessed at the next regular appointment.         Relevant Medications    carvedilol (COREG) 25 MG tablet    losartan (COZAAR) 100 MG tablet    Other Relevant Orders    Comprehensive Metabolic Panel    Lipid Panel    CBC Auto Differential     Mixed hyperlipidemia (Chronic)    Current Assessment & Plan     Lipid abnormalities are improving with treatment.  Pharmacotherapy as ordered.  Lipids will be reassessed in 6 months.         Relevant Medications    rosuvastatin (CRESTOR) 10 MG tablet    Vascepa 1 g capsule capsule    Other Relevant Orders    Comprehensive Metabolic Panel    Lipid Panel    CBC Auto Differential    Acute systolic CHF (congestive heart failure) (HCC)    Current Assessment & Plan     Symptoms resolved with brief inpatient treatment at [facility].    Reviewed together recent labs with normal BNP.    He did have evaded pulmonary pressures concerning for pulmonary hypertension and possible sleep apnea.  He does have a sleep apnea study scheduled.    He is set up with cardiology to establish care with [facility].         Relevant Medications    carvedilol (COREG) 25 MG tablet    aspirin 81 MG EC tablet       Endocrine and Metabolic    Hyperglycemia (Chronic)    Current Assessment & Plan     Reviewed labs with A1c at 6.2.  Encourage diet and exercise efforts with plan to recheck at follow-up visit in 4 months         Relevant Orders    Hemoglobin A1c       Genitourinary and Reproductive     BPH associated with nocturia (Chronic)    Current Assessment & Plan     Continue Flomax with ongoing urology follow-up given PSA elevation         Relevant Medications    tamsulosin (FLOMAX) 0.4 MG capsule 24 hr capsule    Elevated PSA    Current Assessment & Plan     Discussed PSA elevation today.  He is following with urology with plans to recheck PSA in 6 months.  Continue Flomax for BPH            Health Encounters    General medical exam - Primary    Current Assessment & Plan     Reviewed health maintenance and screening along with vaccination options.  Declines Shingrix.    Encourage advance directive.    Reviewed together fasting lab work with plan to work on diet and exercise given prediabetes and plan to recheck in 4 months  with a fasting check on CMP, lipid, and A1c prior to his appointment.    Hemoglobin was slightly low but better than lab work done in the hospital.  We will recheck a CBC with future labs as well.            Pulmonary and Pneumonias    Pulmonary hypertension (HCC)    Current Assessment & Plan     Scheduled for sleep apnea work-up and consultation with cardiology          Patient Self-Management and Personalized Health Advice  The patient has been provided with information about: diet, exercise and weight management and preventive services including:   · Annual Wellness Visit (AWV)  · Hepatitis C Virus Screening (beneficiaries must fall into one of the following categories to be eligible- high risk for HCV infection, born between [birth year]28[birth year], or history of blood transfusion before 1992)  · Prostate Cancer Screening .    Outpatient Encounter Medications as of 1/10/2023   Medication Sig Dispense Refill   • aspirin 81 MG EC tablet Take 1 tablet by mouth Daily.     • carvedilol (COREG) 25 MG tablet Take 1 tablet by mouth 2 (Two) Times a Day.     • losartan (COZAAR) 100 MG tablet Take 1 tablet by mouth Daily.     • multivitamins-minerals (PRESERVISION AREDS 2) capsule capsule      • rosuvastatin (CRESTOR) 10 MG tablet Take 1 tablet by mouth Daily. 90 tablet 1   • tamsulosin (FLOMAX) 0.4 MG capsule 24 hr capsule Take 1 capsule by mouth every night at bedtime.     • Vascepa 1 g capsule capsule Take 2 g by mouth 2 (Two) Times a Day. Dispense as written for severe hypertriglyceridemia 360 capsule 1   • [DISCONTINUED] rosuvastatin (CRESTOR) 10 MG tablet Take 1 tablet by mouth Daily. 30 tablet 5   • [DISCONTINUED] Vascepa 1 g capsule capsule Take 2 g by mouth 2 (Two) Times a Day. Dispense as written for severe hypertriglyceridemia 120 capsule 5     No facility-administered encounter medications on file as of 1/10/2023.       Reviewed use of high risk medication in the elderly: yes  Reviewed for potential of harmful drug  interactions in the elderly: yes    Diagnoses and all orders for this visit:    1. General medical exam (Primary)  Assessment & Plan:  Reviewed health maintenance and screening along with vaccination options.  Declines Shingrix.    Encourage advance directive.    Reviewed together fasting lab work with plan to work on diet and exercise given prediabetes and plan to recheck in 4 months with a fasting check on CMP, lipid, and A1c prior to his appointment.    Hemoglobin was slightly low but better than lab work done in the hospital.  We will recheck a CBC with future labs as well.      2. Elevated PSA  Assessment & Plan:  Discussed PSA elevation today.  He is following with urology with plans to recheck PSA in 6 months.  Continue Flomax for BPH      3. Essential hypertension  Assessment & Plan:  Hypertension is improving with treatment.  Continue current treatment regimen.  Blood pressure will be reassessed at the next regular appointment.    Orders:  -     Comprehensive Metabolic Panel; Future  -     Lipid Panel; Future  -     CBC Auto Differential; Future    4. Mixed hyperlipidemia  Assessment & Plan:  Lipid abnormalities are improving with treatment.  Pharmacotherapy as ordered.  Lipids will be reassessed in 6 months.    Orders:  -     Comprehensive Metabolic Panel; Future  -     Lipid Panel; Future  -     CBC Auto Differential; Future  -     rosuvastatin (CRESTOR) 10 MG tablet; Take 1 tablet by mouth Daily.  Dispense: 90 tablet; Refill: 1  -     Vascepa 1 g capsule capsule; Take 2 g by mouth 2 (Two) Times a Day. Dispense as written for severe hypertriglyceridemia  Dispense: 360 capsule; Refill: 1    5. BPH associated with nocturia  Assessment & Plan:  Continue Flomax with ongoing urology follow-up given PSA elevation      6. Acute systolic CHF (congestive heart failure) (HCC)  Assessment & Plan:  Symptoms resolved with brief inpatient treatment at [facility].    Reviewed together recent labs with normal  BNP.    He did have evaded pulmonary pressures concerning for pulmonary hypertension and possible sleep apnea.  He does have a sleep apnea study scheduled.    He is set up with cardiology to establish care with [facility].      7. Pulmonary hypertension (HCC)  Assessment & Plan:  Scheduled for sleep apnea work-up and consultation with cardiology      8. Hyperglycemia  Assessment & Plan:  Reviewed labs with A1c at 6.2.  Encourage diet and exercise efforts with plan to recheck at follow-up visit in 4 months    Orders:  -     Hemoglobin A1c; Future      Follow Up:  Return in about 4 months (around 5/10/2023) for Med recheck.     There are no Patient Instructions on file for this visit.    An After Visit Summary and PPPS with all of these plans were given to the patient.

## 2023-02-03 PROBLEM — E78.5 HYPERLIPIDEMIA LDL GOAL <100: Status: ACTIVE | Noted: 2022-10-05

## 2023-02-03 NOTE — PROGRESS NOTES
OFFICE VISIT  NOTE  [facility] CARDIOLOGY [place] INT GEN      Name: Werner Brower    Date: 2023  MRN:  [id number]  :  1945      REFERRING/PRIMARY PROVIDER:  Mathew Olivarez MD    Chief Complaint   Patient presents with   • Pulmonary Hypertension       HPI: Werner Brower is a 77 y.o. male who presents today for new consultation for pulmonary hypertension, and diastolic heart failure.  History of hypertension, hyperlipidemia, elevated prostate specific antigen.  He reports an episode in 2022 of a urinary tract infection, was sent home from [facility] ER 1 day then went back the next day and was admitted for 5 days, but was very swollen at the end of that hospitalization, was discharged anyway, his son took him to the [facility], where they diuresed him, per PCP note echo showed diastolic heart failure normal EF, moderate pulmonary hypertension.  He felt much better when he went home.  He takes care of his wife who has myasthenia gravis.  He is quite active at home, lightly salts his food.  Denies chest pain or shortness of breath recently.  Had a stress test and echo in  with Dr. Duncan, it showed mildly reversible inferior defect, no follow-up cardiac catheterization was recommended.    Past Medical History:   Diagnosis Date   • Arthritis    • Back pain    • BPH associated with nocturia    • Condition not found     BROKEN ELBOW   • Essential hypertension    • Gallbladder problem    • Gonarthrosis     BILATERAL   • H/O colonoscopy    • High risk medication use     DRUG THERAPY FINDING   • History of circumcision    • Migraine headache    • Skin problem        Past Surgical History:   Procedure Laterality Date   • CHOLECYSTECTOMY     • COLONOSCOPY  2010    SENSILE POLYPS 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, EXT HEMMORHOIDS REPEAT 3 YEARS ([place])       Social History     Socioeconomic History   • Marital status:    Tobacco Use   • Smoking status: Never   •  Smokeless tobacco: Never   Vaping Use   • Vaping Use: Never used   Substance and Sexual Activity   • Alcohol use: Never   • Drug use: Never   • Sexual activity: Defer       Family History   Problem Relation Age of Onset   • Hearing loss Mother    • Hearing loss Father         ROS:   Constitutional no fever,  no weight loss   Skin no rash, no subcutaneous nodules   Otolaryngeal no difficulty swallowing   Cardiovascular See HPI   Pulmonary no cough, no sputum production   Gastrointestinal no constipation, no diarrhea   Genitourinary no dysuria, no hematuria   Hematologic no easy bruisability, no abnormal bleeding   Musculoskeletal no muscle pain   Neurologic no dizziness, no falls         Allergies   Allergen Reactions   • Ace Inhibitors Unknown - High Severity     Other reaction(s): cough   • Cefuroxime GI Intolerance   • Nitrofurantoin Unknown - High Severity         Current Outpatient Medications:   •  aspirin 81 MG EC tablet, Take 1 tablet by mouth Daily., Disp: , Rfl:   •  carboxymethylcellulose (REFRESH PLUS) 0.5 % solution, Daily As Needed for Dry Eyes., Disp: , Rfl:   •  carvedilol (COREG) 25 MG tablet, Take 1 tablet by mouth 2 (Two) Times a Day., Disp: , Rfl:   •  famotidine (PEPCID) 10 MG tablet, Take 10 mg by mouth Daily., Disp: , Rfl:   •  losartan (COZAAR) 100 MG tablet, Take 1 tablet by mouth Daily., Disp: , Rfl:   •  multivitamin with minerals (MULTIVITAMIN ADULT PO), Take 1 tablet by mouth Daily., Disp: , Rfl:   •  multivitamins-minerals (PRESERVISION AREDS 2) capsule capsule, Daily., Disp: , Rfl:   •  rosuvastatin (CRESTOR) 10 MG tablet, Take 1 tablet by mouth Daily., Disp: 90 tablet, Rfl: 1  •  tamsulosin (FLOMAX) 0.4 MG capsule 24 hr capsule, Take 1 capsule by mouth every night at bedtime., Disp: , Rfl:   •  vitamin C (ASCORBIC ACID) 250 MG tablet, Take 500 mg by mouth 2 (Two) Times a Day., Disp: , Rfl:     Vitals:    02/06/23 0956 02/06/23 1016   BP: 148/84 130/78   BP Location: Right arm   "  Patient Position: Sitting    Pulse: 59    SpO2: 99%    Weight: 115 kg (253 lb 9.6 oz)    Height: 186.7 cm (73.5\")      Body mass index is 33 kg/m².    PHYSICAL EXAM:    General Appearance:   · well developed  · well nourished  HENT:   · oropharynx moist  · lips not cyanotic  Neck:  · thyroid not enlarged  · supple  Respiratory:  · no respiratory distress  · normal breath sounds  · no rales  Cardiovascular:  · no jugular venous distention  · regular rhythm  · apical impulse normal  · S1 normal, S2 normal  · no S3, no S4   · no murmur  · no rub, no thrill  · carotid pulses normal; no bruit  · lower extremity edema: none      Musculoskeletal:  · no clubbing of fingers.   · normocephalic, head atraumatic  Skin:   · warm, dry  Psychiatric:  · judgement and insight appropriate  · normal mood and affect    RESULTS:     ECG 12 Lead    Date/Time: 2/6/2023 10:21 AM  Performed by: Delfino Schmitt MD  Authorized by: Delfino Schmitt MD   Comparison: compared with previous ECG from 10/3/2017  Similar to previous ECG  Rhythm: sinus bradycardia  Rate: bradycardic  BPM: 59  Conduction: 1st degree AV block  QRS axis: normal    Clinical impression: abnormal EKG                  Labs:  Lab Results   Component Value Date    TRIG 159 (H) 12/12/2022    HDL 33 (L) 12/12/2022    LDL 61 12/12/2022    AST 23 12/12/2022    ALT 40 12/12/2022     Lab Results   Component Value Date    HGBA1C 6.2 (H) 12/12/2022       Most recent PCP note, imaging tests, and labs reviewed.    ASSESSMENT:  Problem List Items Addressed This Visit        Cardiac and Vasculature    Essential hypertension - Primary (Chronic)    Hyperlipidemia LDL goal <100    Acute systolic CHF (congestive heart failure) (HCC)       Pulmonary and Pneumonias    Pulmonary hypertension (HCC)       PLAN:    1.  Chronic diastolic heart failure:  I do not have the echo from [facility], however requested to review  Agree with carvedilol and losartan  Advise low-sodium diet and increase aerobic " exercise  If he has shortness of breath in the future I would consider adding Jardiance and low-dose loop diuretic.    2.  Abnormal nuclear stress test:  Stress test from 2021 reviewed, small reversible inferior defect noted by Dr. Duncan, no further testing recommended at that time.  He is asymptomatic currently with no angina or progressive shortness of breath therefore will defer invasive cardiac assessment.  Advised patient to call us if he has any chest pain we will proceed with left heart catheterization.  Continue aspirin and statin    3.  Hyperlipidemia:  Well-controlled on current regimen  Given lack of previous ASCVD, I recommend discontinuing Vascepa    4.  Hypertension:  Slightly elevated in office today but he reports home blood pressure running 120s 130s systolic.  Continue current medical therapy.    Advance Care Planning   ACP discussion was held with the patient during this visit. Patient does not have an advance directive, information provided.         Return to clinic in 9 months, or sooner as needed.    Thank you for the opportunity to share in the care of your patient; please do not hesitate to call me with any questions.     Delfino Schmitt MD, [facility]  Office: [phone] [address] [place] [address]    02/06/23

## 2023-02-06 ENCOUNTER — OFFICE VISIT (OUTPATIENT)
Dept: CARDIOLOGY | Facility: CLINIC | Age: 78
End: 2023-02-06
Payer: MEDICARE

## 2023-02-06 VITALS
BODY MASS INDEX: 32.55 KG/M2 | HEART RATE: 59 BPM | WEIGHT: 253.6 LBS | HEIGHT: 74 IN | SYSTOLIC BLOOD PRESSURE: 130 MMHG | DIASTOLIC BLOOD PRESSURE: 78 MMHG | OXYGEN SATURATION: 99 %

## 2023-02-06 DIAGNOSIS — E78.5 HYPERLIPIDEMIA LDL GOAL <100: ICD-10-CM

## 2023-02-06 DIAGNOSIS — I10 ESSENTIAL HYPERTENSION: Primary | Chronic | ICD-10-CM

## 2023-02-06 DIAGNOSIS — I50.21 ACUTE SYSTOLIC CHF (CONGESTIVE HEART FAILURE): ICD-10-CM

## 2023-02-06 DIAGNOSIS — I27.20 PULMONARY HYPERTENSION: ICD-10-CM

## 2023-02-06 PROCEDURE — 99204 OFFICE O/P NEW MOD 45 MIN: CPT | Performed by: INTERNAL MEDICINE

## 2023-02-06 PROCEDURE — 93000 ELECTROCARDIOGRAM COMPLETE: CPT | Performed by: INTERNAL MEDICINE

## 2023-02-06 RX ORDER — CARBOXYMETHYLCELLULOSE SODIUM 5 MG/ML
SOLUTION/ DROPS OPHTHALMIC DAILY PRN
COMMUNITY

## 2023-02-06 RX ORDER — FAMOTIDINE 10 MG
10 TABLET ORAL DAILY
COMMUNITY

## 2023-02-06 RX ORDER — MULTIPLE VITAMINS W/ MINERALS TAB 9MG-400MCG
1 TAB ORAL DAILY
COMMUNITY

## 2023-02-06 RX ORDER — MULTIVIT WITH MINERALS/LUTEIN
500 TABLET ORAL 2 TIMES DAILY
COMMUNITY

## 2023-03-06 ENCOUNTER — OFFICE VISIT (OUTPATIENT)
Dept: SLEEP MEDICINE | Facility: HOSPITAL | Age: 78
End: 2023-03-06
Payer: MEDICARE

## 2023-03-06 VITALS
DIASTOLIC BLOOD PRESSURE: 71 MMHG | HEART RATE: 68 BPM | OXYGEN SATURATION: 97 % | WEIGHT: 252.4 LBS | HEIGHT: 73 IN | SYSTOLIC BLOOD PRESSURE: 158 MMHG | BODY MASS INDEX: 33.45 KG/M2

## 2023-03-06 DIAGNOSIS — G47.33 OBSTRUCTIVE SLEEP APNEA, ADULT: ICD-10-CM

## 2023-03-06 DIAGNOSIS — R06.83 SNORING: ICD-10-CM

## 2023-03-06 DIAGNOSIS — I27.20 PULMONARY HYPERTENSION: ICD-10-CM

## 2023-03-06 DIAGNOSIS — E66.9 OBESITY (BMI 30-39.9): Primary | ICD-10-CM

## 2023-03-06 PROCEDURE — 99204 OFFICE O/P NEW MOD 45 MIN: CPT | Performed by: INTERNAL MEDICINE

## 2023-03-06 NOTE — PROGRESS NOTES
Werner Brower is a 77 y.o. male.   Chief Complaint   Patient presents with   • Sleeping Problem       HPI     77 y.o. male seen in consultation at the request of Mathew Olivarez,* for evaluation of the above.     He has a history of diastolic heart failure with LVH and increased PA pressures.  He was admitted to [facility] in November of last year with an exacerbation of CHF.  He subsequently saw Dr. Olivarez who was suspicious of the presence of obstructive sleep apnea based upon his pulm hypertension, snoring, and body habitus.    He does not think he is overly somnolent during the day.  He typically awakens 1-3 times per night and averages 7-8 hours of sleep per night.  He keeps a regular sleep schedule.  He does awaken with a dry mouth and sore throat.  He does have some nocturnal cramping but does not have significant RLS symptoms that inhibit his sleep to his perception.  He notes no nocturnal hallucinations or sleep paralysis.    [place] Scale is: 5/24    The patient's relevant past medical, surgical, family, and social history reviewed and updated in Epic as appropriate.    Current medications are:   Current Outpatient Medications:   •  aspirin 81 MG EC tablet, Take 1 tablet by mouth Daily., Disp: , Rfl:   •  carboxymethylcellulose (REFRESH PLUS) 0.5 % solution, Daily As Needed for Dry Eyes., Disp: , Rfl:   •  carvedilol (COREG) 25 MG tablet, Take 1 tablet by mouth 2 (Two) Times a Day., Disp: , Rfl:   •  famotidine (PEPCID) 10 MG tablet, Take 1 tablet by mouth Daily., Disp: , Rfl:   •  losartan (COZAAR) 100 MG tablet, Take 1 tablet by mouth Daily., Disp: , Rfl:   •  multivitamin with minerals tablet tablet, Take 1 tablet by mouth Daily., Disp: , Rfl:   •  multivitamins-minerals (PRESERVISION AREDS 2) capsule capsule, Daily., Disp: , Rfl:   •  rosuvastatin (CRESTOR) 10 MG tablet, Take 1 tablet by mouth Daily., Disp: 90 tablet, Rfl: 1  •  tamsulosin (FLOMAX) 0.4 MG capsule 24 hr capsule,  "Take 1 capsule by mouth every night at bedtime., Disp: , Rfl:   •  vitamin C (ASCORBIC ACID) 250 MG tablet, Take 2 tablets by mouth 2 (Two) Times a Day., Disp: , Rfl: .    Review of Systems    Review of Systems  ROS documented in patient questionnaire ×14 systems.  Reviewed with patient.  Otherwise negative except as noted in HPI.    Physical Exam    Blood pressure 158/71, pulse 68, height 185.4 cm (73\"), weight 114 kg (252 lb 6.4 oz), SpO2 97 %. Body mass index is 33.3 kg/m².    Physical Exam  Vitals and nursing note reviewed.   Constitutional:       Appearance: Normal appearance. He is well-developed.   HENT:      Head: Normocephalic and atraumatic.      Nose: Nose normal.      Mouth/Throat:      Mouth: Mucous membranes are moist.      Pharynx: Oropharynx is clear. No oropharyngeal exudate.      Comments: Class III-IV airway  Eyes:      General: No scleral icterus.     Conjunctiva/sclera: Conjunctivae normal.   Neck:      Thyroid: No thyromegaly.      Trachea: No tracheal deviation.   Cardiovascular:      Rate and Rhythm: Normal rate and regular rhythm.      Heart sounds: No murmur heard.    No friction rub. No gallop.   Pulmonary:      Effort: Pulmonary effort is normal. No respiratory distress.      Breath sounds: No wheezing or rales.   Musculoskeletal:         General: No deformity. Normal range of motion.   Skin:     General: Skin is warm and dry.      Findings: No rash.   Neurological:      Mental Status: He is alert and oriented to person, place, and time.   Psychiatric:         Behavior: Behavior normal.         Thought Content: Thought content normal.         DATA:    Reviewed 12/12/2022 note from Dr. Olivarez    Reviewed echocardiogram results from 11/25/2022 as described above    ASSESSMENT:    Problem List Items Addressed This Visit     Obesity (BMI 30-39.9) - Primary    Pulmonary hypertension (HCC)   Other Visit Diagnoses     Snoring        Relevant Orders    Polysomnography 4 or More Parameters    " Obstructive sleep apnea, adult        Relevant Orders    Polysomnography 4 or More Parameters          77-year-old male at high risk for KIRSTIN.  This is based on his age, gender, BMI, and snoring.  He does not have much in the way of daytime somnolence to his perception.  He has been noted to have pulm hypertension but this may very well be group 2 pulm hypertension related to his LVH and diastolic heart failure.  Certainly untreated KIRSTIN could also be playing a role in his pulmonary hypertension.  He warrants further evaluation for the presence of obstructive sleep apnea.  I made this recommendation as well as went over the diagnostic process and treatment options.    PLAN:    1. Nocturnal polysomnography is an appropriate initial diagnostic step in his case.  2. I discussed the diagnostic process as well as treatment options for obstructive sleep apnea if that is diagnosed.  3. I went over the long-term cardiovascular and metabolic risks of untreated obstructive sleep apnea.  4. I recommended long-term, healthy weight loss.  5. The patient was amenable to a trial of CPAP therapy if deemed appropriate after testing complete.  6. Close sleep center follow-up.      I have reviewed the results of my evaluation and impression and discussed my recommendations in detail with the patient.    Signed by  Temo Mijares MD    March 6, 2023      CC: Mathew Olivarez MD Bucher, Matthew Harold,*

## 2023-03-29 ENCOUNTER — TELEPHONE (OUTPATIENT)
Dept: FAMILY MEDICINE CLINIC | Facility: CLINIC | Age: 78
End: 2023-03-29
Payer: MEDICARE

## 2023-03-29 NOTE — TELEPHONE ENCOUNTER
Caller: Werner Brower    Relationship: Self    Best call back number: [phone]    What is the best time to reach you: ANYTIME    Who are you requesting to speak with (clinical staff, provider,  specific staff member): CLINICAL    Do you know the name of the person who called: PATIENT    What was the call regarding: PATIENT WANTS TO VERIFY IF HIS APPOINTMENT IS NEEDED ON April 5, HE HAS ANOTHER ON 5/31    Do you require a callback: PLEASE ADVISE

## 2023-03-29 NOTE — TELEPHONE ENCOUNTER
Please let patient know that he can cancel his appointment for April and keep his appointment at the end of May.

## 2023-05-22 ENCOUNTER — LAB (OUTPATIENT)
Dept: FAMILY MEDICINE CLINIC | Facility: CLINIC | Age: 78
End: 2023-05-22
Payer: MEDICARE

## 2023-05-22 DIAGNOSIS — I10 ESSENTIAL HYPERTENSION: Chronic | ICD-10-CM

## 2023-05-22 DIAGNOSIS — R73.9 HYPERGLYCEMIA: ICD-10-CM

## 2023-05-22 DIAGNOSIS — E78.2 MIXED HYPERLIPIDEMIA: Chronic | ICD-10-CM

## 2023-05-22 PROCEDURE — 36415 COLL VENOUS BLD VENIPUNCTURE: CPT | Performed by: FAMILY MEDICINE

## 2023-05-23 LAB
ALBUMIN SERPL-MCNC: 4.3 G/DL (ref 3.7–4.7)
ALBUMIN SERPL-MCNC: 4.3 G/DL (ref 3.7–4.7)
ALBUMIN/GLOB SERPL: 1.4 {RATIO} (ref 1.2–2.2)
ALBUMIN/GLOB SERPL: 1.4 {RATIO} (ref 1.2–2.2)
ALP SERPL-CCNC: 63 IU/L (ref 44–121)
ALP SERPL-CCNC: 63 IU/L (ref 44–121)
ALT SERPL-CCNC: 17 IU/L (ref 0–44)
ALT SERPL-CCNC: 17 IU/L (ref 0–44)
AST SERPL-CCNC: 18 IU/L (ref 0–40)
AST SERPL-CCNC: 18 IU/L (ref 0–40)
BASOPHILS # BLD AUTO: 0 X10E3/UL (ref 0–0.2)
BASOPHILS # BLD AUTO: 0 X10E3/UL (ref 0–0.2)
BASOPHILS NFR BLD AUTO: 0 %
BASOPHILS NFR BLD AUTO: 0 %
BILIRUB SERPL-MCNC: 0.5 MG/DL (ref 0–1.2)
BILIRUB SERPL-MCNC: 0.5 MG/DL (ref 0–1.2)
BUN SERPL-MCNC: 17 MG/DL (ref 8–27)
BUN SERPL-MCNC: 17 MG/DL (ref 8–27)
BUN/CREAT SERPL: 18 (ref 10–24)
BUN/CREAT SERPL: 18 (ref 10–24)
CALCIUM SERPL-MCNC: 9.2 MG/DL (ref 8.6–10.2)
CALCIUM SERPL-MCNC: 9.2 MG/DL (ref 8.6–10.2)
CHLORIDE SERPL-SCNC: 104 MMOL/L (ref 96–106)
CHLORIDE SERPL-SCNC: 104 MMOL/L (ref 96–106)
CHOLEST SERPL-MCNC: 101 MG/DL (ref 100–199)
CHOLEST SERPL-MCNC: 101 MG/DL (ref 100–199)
CO2 SERPL-SCNC: 24 MMOL/L (ref 20–29)
CO2 SERPL-SCNC: 24 MMOL/L (ref 20–29)
CREAT SERPL-MCNC: 0.93 MG/DL (ref 0.76–1.27)
CREAT SERPL-MCNC: 0.93 MG/DL (ref 0.76–1.27)
EGFRCR SERPLBLD CKD-EPI 2021: 85 ML/MIN/1.73
EGFRCR SERPLBLD CKD-EPI 2021: 85 ML/MIN/1.73
EOSINOPHIL # BLD AUTO: 0.4 X10E3/UL (ref 0–0.4)
EOSINOPHIL # BLD AUTO: 0.4 X10E3/UL (ref 0–0.4)
EOSINOPHIL NFR BLD AUTO: 5 %
EOSINOPHIL NFR BLD AUTO: 5 %
ERYTHROCYTE [DISTWIDTH] IN BLOOD BY AUTOMATED COUNT: 12.5 % (ref 11.6–15.4)
ERYTHROCYTE [DISTWIDTH] IN BLOOD BY AUTOMATED COUNT: 12.5 % (ref 11.6–15.4)
GLOBULIN SER CALC-MCNC: 3.1 G/DL (ref 1.5–4.5)
GLOBULIN SER CALC-MCNC: 3.1 G/DL (ref 1.5–4.5)
GLUCOSE SERPL-MCNC: 115 MG/DL (ref 70–99)
GLUCOSE SERPL-MCNC: 115 MG/DL (ref 70–99)
HBA1C MFR BLD: 6.3 % (ref 4.8–5.6)
HBA1C MFR BLD: 6.3 % (ref 4.8–5.6)
HCT VFR BLD AUTO: 40.6 % (ref 37.5–51)
HCT VFR BLD AUTO: 40.6 % (ref 37.5–51)
HDLC SERPL-MCNC: 38 MG/DL
HDLC SERPL-MCNC: 38 MG/DL
HGB BLD-MCNC: 13.6 G/DL (ref 13–17.7)
HGB BLD-MCNC: 13.6 G/DL (ref 13–17.7)
IMM GRANULOCYTES # BLD AUTO: 0 X10E3/UL (ref 0–0.1)
IMM GRANULOCYTES # BLD AUTO: 0 X10E3/UL (ref 0–0.1)
IMM GRANULOCYTES NFR BLD AUTO: 0 %
IMM GRANULOCYTES NFR BLD AUTO: 0 %
LDLC SERPL CALC-MCNC: 40 MG/DL (ref 0–99)
LDLC SERPL CALC-MCNC: 40 MG/DL (ref 0–99)
LYMPHOCYTES # BLD AUTO: 2.4 X10E3/UL (ref 0.7–3.1)
LYMPHOCYTES # BLD AUTO: 2.4 X10E3/UL (ref 0.7–3.1)
LYMPHOCYTES NFR BLD AUTO: 31 %
LYMPHOCYTES NFR BLD AUTO: 31 %
MCH RBC QN AUTO: 32.1 PG (ref 26.6–33)
MCH RBC QN AUTO: 32.1 PG (ref 26.6–33)
MCHC RBC AUTO-ENTMCNC: 33.5 G/DL (ref 31.5–35.7)
MCHC RBC AUTO-ENTMCNC: 33.5 G/DL (ref 31.5–35.7)
MCV RBC AUTO: 96 FL (ref 79–97)
MCV RBC AUTO: 96 FL (ref 79–97)
MONOCYTES # BLD AUTO: 0.8 X10E3/UL (ref 0.1–0.9)
MONOCYTES # BLD AUTO: 0.8 X10E3/UL (ref 0.1–0.9)
MONOCYTES NFR BLD AUTO: 10 %
MONOCYTES NFR BLD AUTO: 10 %
NEUTROPHILS # BLD AUTO: 4.2 X10E3/UL (ref 1.4–7)
NEUTROPHILS # BLD AUTO: 4.2 X10E3/UL (ref 1.4–7)
NEUTROPHILS NFR BLD AUTO: 54 %
NEUTROPHILS NFR BLD AUTO: 54 %
PLATELET # BLD AUTO: 351 X10E3/UL (ref 150–450)
PLATELET # BLD AUTO: 351 X10E3/UL (ref 150–450)
POTASSIUM SERPL-SCNC: 5.3 MMOL/L (ref 3.5–5.2)
POTASSIUM SERPL-SCNC: 5.3 MMOL/L (ref 3.5–5.2)
PROT SERPL-MCNC: 7.4 G/DL (ref 6–8.5)
PROT SERPL-MCNC: 7.4 G/DL (ref 6–8.5)
RBC # BLD AUTO: 4.24 X10E6/UL (ref 4.14–5.8)
RBC # BLD AUTO: 4.24 X10E6/UL (ref 4.14–5.8)
SODIUM SERPL-SCNC: 140 MMOL/L (ref 134–144)
SODIUM SERPL-SCNC: 140 MMOL/L (ref 134–144)
TRIGL SERPL-MCNC: 129 MG/DL (ref 0–149)
TRIGL SERPL-MCNC: 129 MG/DL (ref 0–149)
VLDLC SERPL CALC-MCNC: 23 MG/DL (ref 5–40)
VLDLC SERPL CALC-MCNC: 23 MG/DL (ref 5–40)
WBC # BLD AUTO: 7.8 X10E3/UL (ref 3.4–10.8)
WBC # BLD AUTO: 7.8 X10E3/UL (ref 3.4–10.8)

## 2023-05-31 ENCOUNTER — OFFICE VISIT (OUTPATIENT)
Dept: FAMILY MEDICINE CLINIC | Facility: CLINIC | Age: 78
End: 2023-05-31

## 2023-05-31 VITALS
BODY MASS INDEX: 32.02 KG/M2 | OXYGEN SATURATION: 97 % | SYSTOLIC BLOOD PRESSURE: 122 MMHG | DIASTOLIC BLOOD PRESSURE: 72 MMHG | WEIGHT: 249.5 LBS | HEIGHT: 74 IN | HEART RATE: 60 BPM

## 2023-05-31 DIAGNOSIS — N40.1 BPH ASSOCIATED WITH NOCTURIA: Chronic | ICD-10-CM

## 2023-05-31 DIAGNOSIS — R35.1 BPH ASSOCIATED WITH NOCTURIA: Chronic | ICD-10-CM

## 2023-05-31 DIAGNOSIS — E78.5 HYPERLIPIDEMIA LDL GOAL <100: ICD-10-CM

## 2023-05-31 DIAGNOSIS — M17.12 PRIMARY OSTEOARTHRITIS OF LEFT KNEE: ICD-10-CM

## 2023-05-31 DIAGNOSIS — I50.32 CHRONIC DIASTOLIC (CONGESTIVE) HEART FAILURE: Primary | ICD-10-CM

## 2023-05-31 DIAGNOSIS — I10 ESSENTIAL HYPERTENSION: Chronic | ICD-10-CM

## 2023-05-31 DIAGNOSIS — E66.09 CLASS 1 OBESITY DUE TO EXCESS CALORIES WITH SERIOUS COMORBIDITY AND BODY MASS INDEX (BMI) OF 32.0 TO 32.9 IN ADULT: ICD-10-CM

## 2023-05-31 DIAGNOSIS — R97.20 ELEVATED PSA: ICD-10-CM

## 2023-05-31 DIAGNOSIS — R73.9 HYPERGLYCEMIA: Chronic | ICD-10-CM

## 2023-05-31 PROBLEM — I50.21 ACUTE SYSTOLIC CHF (CONGESTIVE HEART FAILURE): Status: RESOLVED | Noted: 2022-12-12 | Resolved: 2023-05-31

## 2023-05-31 PROBLEM — Z79.899 HIGH RISK MEDICATION USE: Status: RESOLVED | Noted: 2022-10-05 | Resolved: 2023-05-31

## 2023-05-31 PROBLEM — Z11.59 NEED FOR HEPATITIS C SCREENING TEST: Status: RESOLVED | Noted: 2022-12-12 | Resolved: 2023-05-31

## 2023-05-31 RX ORDER — ROSUVASTATIN CALCIUM 10 MG/1
10 TABLET, COATED ORAL DAILY
Qty: 90 TABLET | Refills: 1 | Status: SHIPPED | OUTPATIENT
Start: 2023-05-31

## 2023-05-31 RX ORDER — LOSARTAN POTASSIUM 100 MG/1
100 TABLET ORAL DAILY
Start: 2023-05-31

## 2023-05-31 RX ORDER — ASPIRIN 81 MG/1
81 TABLET ORAL DAILY
Start: 2023-05-31

## 2023-05-31 RX ORDER — CARVEDILOL 25 MG/1
25 TABLET ORAL 2 TIMES DAILY
Start: 2023-05-31

## 2023-05-31 NOTE — ASSESSMENT & PLAN NOTE
Scheduling consultation with orthopedics regarding worsening left knee osteoarthritis and desire to get knee replacement surgery this fall

## 2023-05-31 NOTE — ASSESSMENT & PLAN NOTE
Reviewed stable A1c with lab work and encouraged ongoing diet and exercise efforts to help prevent the progression of diabetes.    He is motivated to work on diet and exercise to help prevent diabetes progression

## 2023-05-31 NOTE — ASSESSMENT & PLAN NOTE
Ongoing follow-up with urology.  Next appointment and follow-up PSA scheduled for June with Dr. Fried

## 2023-05-31 NOTE — PROGRESS NOTES
"Chief Complaint  Hypertension, Hyperlipidemia, Hyperglycemia, and Chronic diastolic heart failure    Subjective    History of Present Illness:  Werner Brower is a 77 y.o. male who presents today for follow-up visit and to review recent fasting labs.    Doing well after our visit earlier this year for Medicare annual wellness.    He is having some increased left knee osteoarthritis symptoms and would like to see orthopedics regarding desire for knee replacement.  He did have his right knee replaced with Vasichek at [facility] orthopedics several years ago but has heard that she retired.  He is interested in seeing orthopedics with [facility]    He did have an episode of volume overload with heart failure that resulted from his volume overload but his ejection fraction returned to normal after a solution of his volume overload episode.  His BNP returned normal after resolution of his acute volume overload.  He is following with Dr. Schmitt regarding his chronic diastolic heart failure.     He continues on carvedilol, losartan, low-dose aspirin, Crestor, and was taken off Vascepa by Cardiology.     Recent labs did show mild A1c elevation at 6.3 and he is working on diet and exercise before medications.     He is following with urology given recent PSA elevation at 4.7.  He is now on Flomax for BPH symptoms we will plan to recheck his PSA through urology at his appointment with Dr. Fried which is scheduled for June 21, 2023.     Declines Shingrix vaccination    Objective   Vital Signs:   /72 (BP Location: Right arm)   Pulse 60   Ht 186.7 cm (73.5\")   Wt 113 kg (249 lb 8 oz)   SpO2 97%   BMI 32.47 kg/m²     Review of Systems   Constitutional: Negative for appetite change, chills and fever.   HENT: Negative for hearing loss.    Eyes: Negative for blurred vision.   Respiratory: Negative for chest tightness.    Cardiovascular: Negative for chest pain.   Gastrointestinal: Negative for abdominal pain. "   Musculoskeletal: Positive for arthralgias. Negative for gait problem.   Skin: Negative for rash.   Psychiatric/Behavioral: Negative for depressed mood.       Past History:  Medical History: has a past medical history of Arrhythmia, Arthritis, Back pain, BPH associated with nocturia, Condition not found, Essential hypertension, Gallbladder problem, Gonarthrosis, H/O colonoscopy, High risk medication use, History of circumcision, Migraine headache, Peptic ulceration, RLS (restless legs syndrome), and Skin problem.   Surgical History: has a past surgical history that includes Cholecystectomy; Colonoscopy (2010); Elbow arthroscopy (Left); Circumcision, non-; and Replacement total knee (Right).   Family History: family history includes Hearing loss in his father and mother.   Social History: reports that he has never smoked. He has never used smokeless tobacco. He reports that he does not currently use alcohol. He reports that he does not use drugs.      Current Outpatient Medications:   •  aspirin 81 MG EC tablet, Take 1 tablet by mouth Daily., Disp: , Rfl:   •  carboxymethylcellulose (REFRESH PLUS) 0.5 % solution, Daily As Needed for Dry Eyes., Disp: , Rfl:   •  carvedilol (COREG) 25 MG tablet, Take 1 tablet by mouth 2 (Two) Times a Day., Disp: , Rfl:   •  famotidine (PEPCID) 10 MG tablet, Take 1 tablet by mouth Daily., Disp: , Rfl:   •  losartan (COZAAR) 100 MG tablet, Take 1 tablet by mouth Daily., Disp: , Rfl:   •  multivitamin with minerals tablet tablet, Take 1 tablet by mouth Daily., Disp: , Rfl:   •  multivitamins-minerals (PRESERVISION AREDS 2) capsule capsule, Daily., Disp: , Rfl:   •  rosuvastatin (CRESTOR) 10 MG tablet, Take 1 tablet by mouth Daily., Disp: 90 tablet, Rfl: 1  •  tamsulosin (FLOMAX) 0.4 MG capsule 24 hr capsule, Take 1 capsule by mouth every night at bedtime., Disp: , Rfl:   •  vitamin C (ASCORBIC ACID) 250 MG tablet, Take 2 tablets by mouth 2 (Two) Times a Day., Disp: , Rfl:      Allergies: Ace inhibitors, Cefuroxime, and Nitrofurantoin    Physical Exam  Constitutional:       Appearance: He is obese.   HENT:      Head: Normocephalic.      Right Ear: External ear normal.      Left Ear: External ear normal.      Nose: Nose normal.   Eyes:      Pupils: Pupils are equal, round, and reactive to light.   Cardiovascular:      Rate and Rhythm: Normal rate and regular rhythm.      Heart sounds: Normal heart sounds.   Pulmonary:      Effort: Pulmonary effort is normal.      Breath sounds: Normal breath sounds.   Musculoskeletal:      Cervical back: Normal range of motion.      Comments: L knee crepitus c/w worsening OA   Skin:     General: Skin is warm and dry.   Neurological:      General: No focal deficit present.      Mental Status: He is alert.   Psychiatric:         Mood and Affect: Mood normal.         Behavior: Behavior normal.         Thought Content: Thought content normal.          Result Review                   Assessment and Plan  Diagnoses and all orders for this visit:    1. Chronic diastolic (congestive) heart failure (Primary)  Assessment & Plan:  Follow-up ongoing with cardiology.  Continue low-dose aspirin, Coreg, losartan, and Crestor.    .    Orders:  -     aspirin 81 MG EC tablet; Take 1 tablet by mouth Daily.  -     carvedilol (COREG) 25 MG tablet; Take 1 tablet by mouth 2 (Two) Times a Day.  -     losartan (COZAAR) 100 MG tablet; Take 1 tablet by mouth Daily.  -     rosuvastatin (CRESTOR) 10 MG tablet; Take 1 tablet by mouth Daily.  Dispense: 90 tablet; Refill: 1  -     Comprehensive Metabolic Panel; Future  -     Lipid Panel; Future    2. Essential hypertension  Assessment & Plan:  Hypertension is improving with treatment.  Continue current treatment regimen.  Blood pressure will be reassessed at the next regular appointment.    Orders:  -     carvedilol (COREG) 25 MG tablet; Take 1 tablet by mouth 2 (Two) Times a Day.  -     losartan (COZAAR) 100 MG tablet; Take 1 tablet  by mouth Daily.  -     Comprehensive Metabolic Panel; Future  -     Lipid Panel; Future    3. Hyperglycemia  Assessment & Plan:  Reviewed stable A1c with lab work and encouraged ongoing diet and exercise efforts to help prevent the progression of diabetes.    He is motivated to work on diet and exercise to help prevent diabetes progression    Orders:  -     Hemoglobin A1c; Future    4. BPH associated with nocturia  Assessment & Plan:  Continues Flomax with urology follow-up scheduled for next month      5. Elevated PSA  Assessment & Plan:  Ongoing follow-up with urology.  Next appointment and follow-up PSA scheduled for June with Dr. Fried      6. Hyperlipidemia LDL goal <100  Assessment & Plan:  Lipid abnormalities are improving with treatment.  Pharmacotherapy as ordered.  Lipids will be reassessed in 6 months.    Orders:  -     rosuvastatin (CRESTOR) 10 MG tablet; Take 1 tablet by mouth Daily.  Dispense: 90 tablet; Refill: 1  -     Comprehensive Metabolic Panel; Future  -     Lipid Panel; Future    7. Primary osteoarthritis of left knee  Assessment & Plan:  Scheduling consultation with orthopedics regarding worsening left knee osteoarthritis and desire to get knee replacement surgery this fall    Orders:  -     Ambulatory Referral to Orthopedic Surgery    8. Class 1 obesity due to excess calories with serious comorbidity and body mass index (BMI) of 32.0 to 32.9 in adult      BMI is >= 30 and <35. (Class 1 Obesity). The following options were offered after discussion;: exercise counseling/recommendations and nutrition counseling/recommendations          Follow Up  Return in about 4 months (around 9/30/2023) for Med recheck, Fasting labs 1 week before apt (Drink water).    Mathew Olivarez MD

## 2023-07-06 PROBLEM — G47.33 OSA (OBSTRUCTIVE SLEEP APNEA): Status: ACTIVE | Noted: 2023-07-05

## 2023-08-15 ENCOUNTER — CLINICAL SUPPORT (OUTPATIENT)
Dept: ORTHOPEDIC SURGERY | Facility: CLINIC | Age: 78
End: 2023-08-15
Payer: MEDICARE

## 2023-08-15 DIAGNOSIS — M17.12 PRIMARY OSTEOARTHRITIS OF LEFT KNEE: ICD-10-CM

## 2023-08-15 RX ORDER — LIDOCAINE HYDROCHLORIDE 10 MG/ML
5 INJECTION, SOLUTION EPIDURAL; INFILTRATION; INTRACAUDAL; PERINEURAL
Status: COMPLETED | OUTPATIENT
Start: 2023-08-15 | End: 2023-08-15

## 2023-08-15 RX ADMIN — LIDOCAINE HYDROCHLORIDE 5 ML: 10 INJECTION, SOLUTION EPIDURAL; INFILTRATION; INTRACAUDAL; PERINEURAL at 10:35

## 2023-08-15 NOTE — PROGRESS NOTES
Procedure   - Large Joint Arthrocentesis: L knee on 8/15/2023 10:35 AM  Indications: pain  Details: (21) needle, ultrasound-guided lateral approach  Medications: 30 mg Hyaluronan 30 MG/2ML; 5 mL lidocaine PF 1% 1 %  Outcome: tolerated well, no immediate complications  Procedure, treatment alternatives, risks and benefits explained, specific risks discussed. Consent was given by the patient. Immediately prior to procedure a time out was called to verify the correct patient, procedure, equipment, support staff and site/side marked as required. Patient was prepped and draped in the usual sterile fashion.        77-year-old male with left knee pain from left knee osteoarthritis.  Here today for ultrasound-guided Orthovisc injection #1 of a 3 part series.  Ultrasound guidance used for proper needle placement.  Procedure was explained in detail prior to starting.  All questions answered to the best of my ability.  Procedure was completed without complication.  See procedure note above.  He will follow-up in 1 week for injection #2.

## 2023-08-22 ENCOUNTER — CLINICAL SUPPORT (OUTPATIENT)
Dept: ORTHOPEDIC SURGERY | Facility: CLINIC | Age: 78
End: 2023-08-22
Payer: MEDICARE

## 2023-08-22 DIAGNOSIS — M17.12 PRIMARY OSTEOARTHRITIS OF LEFT KNEE: Primary | ICD-10-CM

## 2023-08-22 RX ORDER — LIDOCAINE HYDROCHLORIDE 10 MG/ML
5 INJECTION, SOLUTION EPIDURAL; INFILTRATION; INTRACAUDAL; PERINEURAL
Status: COMPLETED | OUTPATIENT
Start: 2023-08-22 | End: 2023-08-22

## 2023-08-22 RX ADMIN — LIDOCAINE HYDROCHLORIDE 5 ML: 10 INJECTION, SOLUTION EPIDURAL; INFILTRATION; INTRACAUDAL; PERINEURAL at 07:56

## 2023-08-22 NOTE — PROGRESS NOTES
Procedure   - Large Joint Arthrocentesis: L knee on 8/22/2023 7:56 AM  Indications: pain  Details: (23) needle, ultrasound-guided lateral approach  Medications: 30 mg Hyaluronan 30 MG/2ML; 5 mL lidocaine PF 1% 1 %  Outcome: tolerated well, no immediate complications  Procedure, treatment alternatives, risks and benefits explained, specific risks discussed. Consent was given by the patient. Immediately prior to procedure a time out was called to verify the correct patient, procedure, equipment, support staff and site/side marked as required. Patient was prepped and draped in the usual sterile fashion.      77-year-old male with left knee pain from left knee osteoarthritis.  Here today for ultrasound-guided Orthovisc injection #2 of a 3 part series.  Ultrasound guidance used for proper needle placement.  Procedure was explained in detail prior to starting.  All questions answered to the best of my ability.  Procedure was completed without complication.  See procedure note above.  He will follow-up in 1 week for injection #3.

## 2023-09-05 ENCOUNTER — CLINICAL SUPPORT (OUTPATIENT)
Dept: ORTHOPEDIC SURGERY | Facility: CLINIC | Age: 78
End: 2023-09-05
Payer: MEDICARE

## 2023-09-05 DIAGNOSIS — M17.12 PRIMARY OSTEOARTHRITIS OF LEFT KNEE: Primary | ICD-10-CM

## 2023-09-05 RX ORDER — LIDOCAINE HYDROCHLORIDE 10 MG/ML
5 INJECTION, SOLUTION EPIDURAL; INFILTRATION; INTRACAUDAL; PERINEURAL
Status: COMPLETED | OUTPATIENT
Start: 2023-09-05 | End: 2023-09-05

## 2023-09-05 RX ADMIN — LIDOCAINE HYDROCHLORIDE 5 ML: 10 INJECTION, SOLUTION EPIDURAL; INFILTRATION; INTRACAUDAL; PERINEURAL at 09:56

## 2023-09-05 NOTE — PROGRESS NOTES
Procedure   - Large Joint Arthrocentesis: L knee on 9/5/2023 9:56 AM  Indications: pain  Details: 21 G needle, ultrasound-guided lateral approach  Medications: 5 mL lidocaine PF 1% 1 %; 30 mg Hyaluronan 30 MG/2ML  Outcome: tolerated well, no immediate complications  Procedure, treatment alternatives, risks and benefits explained, specific risks discussed. Consent was given by the patient. Immediately prior to procedure a time out was called to verify the correct patient, procedure, equipment, support staff and site/side marked as required. Patient was prepped and draped in the usual sterile fashion.        78-year-old male with left knee pain from left knee osteoarthritis.  Here today for ultrasound-guided Orthovisc injection #3 of a 3 part series.  Ultrasound guidance used for proper needle placement.  Procedure was explained in detail prior to starting.  All questions answered to the best of my ability.  Procedure was completed without complication.  See procedure note above.  He will follow-up as his symptoms dictate.

## 2023-09-22 ENCOUNTER — LAB (OUTPATIENT)
Dept: FAMILY MEDICINE CLINIC | Facility: CLINIC | Age: 78
End: 2023-09-22
Payer: MEDICARE

## 2023-09-22 DIAGNOSIS — I10 ESSENTIAL HYPERTENSION: Chronic | ICD-10-CM

## 2023-09-22 DIAGNOSIS — E78.5 HYPERLIPIDEMIA LDL GOAL <100: ICD-10-CM

## 2023-09-22 DIAGNOSIS — I50.32 CHRONIC DIASTOLIC (CONGESTIVE) HEART FAILURE: ICD-10-CM

## 2023-09-22 DIAGNOSIS — R73.9 HYPERGLYCEMIA: Chronic | ICD-10-CM

## 2023-09-22 PROCEDURE — 36415 COLL VENOUS BLD VENIPUNCTURE: CPT | Performed by: FAMILY MEDICINE

## 2023-09-23 LAB
ALBUMIN SERPL-MCNC: 4.1 G/DL (ref 3.8–4.8)
ALBUMIN/GLOB SERPL: 1.3 {RATIO} (ref 1.2–2.2)
ALP SERPL-CCNC: 56 IU/L (ref 44–121)
ALT SERPL-CCNC: 17 IU/L (ref 0–44)
AST SERPL-CCNC: 18 IU/L (ref 0–40)
BILIRUB SERPL-MCNC: 0.4 MG/DL (ref 0–1.2)
BUN SERPL-MCNC: 18 MG/DL (ref 8–27)
BUN/CREAT SERPL: 17 (ref 10–24)
CALCIUM SERPL-MCNC: 9 MG/DL (ref 8.6–10.2)
CHLORIDE SERPL-SCNC: 102 MMOL/L (ref 96–106)
CHOLEST SERPL-MCNC: 100 MG/DL (ref 100–199)
CO2 SERPL-SCNC: 21 MMOL/L (ref 20–29)
CREAT SERPL-MCNC: 1.08 MG/DL (ref 0.76–1.27)
EGFRCR SERPLBLD CKD-EPI 2021: 71 ML/MIN/1.73
GLOBULIN SER CALC-MCNC: 3.1 G/DL (ref 1.5–4.5)
GLUCOSE SERPL-MCNC: 101 MG/DL (ref 70–99)
HBA1C MFR BLD: 6.3 % (ref 4.8–5.6)
HDLC SERPL-MCNC: 35 MG/DL
LDLC SERPL CALC-MCNC: 43 MG/DL (ref 0–99)
POTASSIUM SERPL-SCNC: 4.7 MMOL/L (ref 3.5–5.2)
PROT SERPL-MCNC: 7.2 G/DL (ref 6–8.5)
SODIUM SERPL-SCNC: 136 MMOL/L (ref 134–144)
TRIGL SERPL-MCNC: 124 MG/DL (ref 0–149)
VLDLC SERPL CALC-MCNC: 22 MG/DL (ref 5–40)

## 2023-09-29 ENCOUNTER — OFFICE VISIT (OUTPATIENT)
Dept: FAMILY MEDICINE CLINIC | Facility: CLINIC | Age: 78
End: 2023-09-29
Payer: MEDICARE

## 2023-09-29 VITALS
HEART RATE: 56 BPM | HEIGHT: 74 IN | DIASTOLIC BLOOD PRESSURE: 74 MMHG | TEMPERATURE: 97.3 F | BODY MASS INDEX: 32.34 KG/M2 | WEIGHT: 252 LBS | OXYGEN SATURATION: 97 % | SYSTOLIC BLOOD PRESSURE: 136 MMHG

## 2023-09-29 DIAGNOSIS — I10 ESSENTIAL HYPERTENSION: Primary | Chronic | ICD-10-CM

## 2023-09-29 DIAGNOSIS — D23.61 DERMATOFIBROMA OF FOREARM, RIGHT: ICD-10-CM

## 2023-09-29 DIAGNOSIS — R35.1 BPH ASSOCIATED WITH NOCTURIA: Chronic | ICD-10-CM

## 2023-09-29 DIAGNOSIS — R73.9 HYPERGLYCEMIA: Chronic | ICD-10-CM

## 2023-09-29 DIAGNOSIS — E66.09 CLASS 1 OBESITY DUE TO EXCESS CALORIES WITH SERIOUS COMORBIDITY AND BODY MASS INDEX (BMI) OF 32.0 TO 32.9 IN ADULT: ICD-10-CM

## 2023-09-29 DIAGNOSIS — I50.32 CHRONIC DIASTOLIC (CONGESTIVE) HEART FAILURE: ICD-10-CM

## 2023-09-29 DIAGNOSIS — E78.5 HYPERLIPIDEMIA LDL GOAL <100: ICD-10-CM

## 2023-09-29 DIAGNOSIS — N40.1 BPH ASSOCIATED WITH NOCTURIA: Chronic | ICD-10-CM

## 2023-09-29 PROBLEM — E66.811 CLASS 1 OBESITY DUE TO EXCESS CALORIES WITH SERIOUS COMORBIDITY AND BODY MASS INDEX (BMI) OF 32.0 TO 32.9 IN ADULT: Status: ACTIVE | Noted: 2023-09-29

## 2023-09-29 PROBLEM — Z98.890 HISTORY OF COLONOSCOPY: Status: RESOLVED | Noted: 2022-10-05 | Resolved: 2023-09-29

## 2023-09-29 RX ORDER — TAMSULOSIN HYDROCHLORIDE 0.4 MG/1
1 CAPSULE ORAL
Start: 2023-09-29

## 2023-09-29 RX ORDER — ROSUVASTATIN CALCIUM 10 MG/1
10 TABLET, COATED ORAL DAILY
Qty: 90 TABLET | Refills: 1 | Status: SHIPPED | OUTPATIENT
Start: 2023-09-29

## 2023-09-29 RX ORDER — CARVEDILOL 25 MG/1
25 TABLET ORAL 2 TIMES DAILY
Start: 2023-09-29

## 2023-09-29 RX ORDER — LOSARTAN POTASSIUM 100 MG/1
100 TABLET ORAL DAILY
Start: 2023-09-29

## 2023-09-29 RX ORDER — ASPIRIN 81 MG/1
81 TABLET ORAL DAILY
Start: 2023-09-29

## 2023-09-29 NOTE — ASSESSMENT & PLAN NOTE
Patient's (Body mass index is 32.8 kg/m².) indicates that they are obese (BMI >30) with health conditions that include hypertension, dyslipidemias, and osteoarthritis . Weight is improving with lifestyle modifications. BMI  is above average; BMI management plan is completed. We discussed portion control and increasing exercise.

## 2023-09-29 NOTE — ASSESSMENT & PLAN NOTE
Focused exam with right forearm dermatofibroma with central dimple.  Discussed treatment options and given it is symptomatic and painful at times he would like cryotherapy today.  Cryotherapy completed of this 4 mm lesion with 3 freeze thaw cycles.  No complications.  Aftercare instructions reviewed.  Follow-up ongoing with dermatology.

## 2023-09-29 NOTE — PROGRESS NOTES
"Chief Complaint  med check and Arm Pain (Spot on right forearm. Patient stated it hurts when he bumps it into things and it sometimes sore)    Subjective    History of Present Illness:  Werner Brower is a 78 y.o. male who presents today for  follow-up visit and to review recent fasting labs.     Doing well after our visit earlier this year for Medicare annual wellness.     He did have an episode of volume overload with heart failure that resulted from his volume overload but his ejection fraction returned to normal after a solution of his volume overload episode.  His BNP returned normal after resolution of his acute volume overload.  He is following with Dr. Schmitt regarding his chronic diastolic heart failure.     He continues on carvedilol, losartan, low-dose aspirin, Crestor, and was taken off Vascepa by Cardiology.     Recent labs did show mild A1c elevation at 6.3 and he is working on diet and exercise before medications.     He is following with urology given recent PSA elevation at 4.7.  He is now on Flomax for BPH symptoms we will plan to recheck his PSA through urology at his appointment with Dr. Fried which is scheduled for June 21, 2023.     Declines Shingrix vaccination    Right forearm lesion with central dimple consistent with dermatofibroma.  It is irritating and hurts when he bumps this.  He would like cryotherapy today for destruction he is following with dermatology as well.          Objective   Vital Signs:   /74   Pulse 56   Temp 97.3 °F (36.3 °C)   Ht 186.7 cm (73.5\")   Wt 114 kg (252 lb)   SpO2 97%   BMI 32.80 kg/m²     Review of Systems   Constitutional:  Negative for appetite change, chills and fever.   HENT:  Negative for hearing loss.    Eyes:  Negative for blurred vision.   Respiratory:  Negative for chest tightness.    Cardiovascular:  Negative for chest pain.   Gastrointestinal:  Negative for abdominal pain.   Musculoskeletal:  Negative for gait problem.   Skin:  " Positive for skin lesions. Negative for rash.   Psychiatric/Behavioral:  Negative for depressed mood.      Past History:  Medical History: has a past medical history of Allergic (several years ago), Arrhythmia, Arthritis, Back pain, BPH associated with nocturia, Cervical disc disorder, Colon polyp (several years ago), Condition not found, Erectile dysfunction (3 years ago), Essential hypertension, Gallbladder problem, Gonarthrosis, H/O colonoscopy, Heart murmur (many years ago), High risk medication use, History of circumcision, HL (hearing loss) (many years ago), Hyperlipidemia (many years ago), Knee sprain, Migraine headache, Peptic ulceration, RLS (restless legs syndrome), Rotator cuff syndrome, Skin problem, Urinary tract infection (several times 4 years ago), and Visual impairment (many years ago).   Surgical History: has a past surgical history that includes Cholecystectomy; Colonoscopy (2010); Elbow arthroscopy (Left); Circumcision, non-; Replacement total knee (Right); Joint replacement (right knee ); and Elbow surgery.   Family History: family history includes Hearing loss in his father and mother.   Social History: reports that he quit smoking about 48 years ago. His smoking use included cigars. He has never used smokeless tobacco. He reports that he does not currently use alcohol. He reports that he does not use drugs.      Current Outpatient Medications:     aspirin 81 MG EC tablet, Take 1 tablet by mouth Daily., Disp: , Rfl:     carboxymethylcellulose (REFRESH PLUS) 0.5 % solution, Daily As Needed for Dry Eyes., Disp: , Rfl:     carvedilol (COREG) 25 MG tablet, Take 1 tablet by mouth 2 (Two) Times a Day., Disp: , Rfl:     losartan (COZAAR) 100 MG tablet, Take 1 tablet by mouth Daily., Disp: , Rfl:     multivitamin with minerals tablet tablet, Take 1 tablet by mouth Daily., Disp: , Rfl:     multivitamins-minerals (PRESERVISION AREDS 2) capsule capsule, Daily., Disp: , Rfl:      rosuvastatin (CRESTOR) 10 MG tablet, Take 1 tablet by mouth Daily., Disp: 90 tablet, Rfl: 1    tamsulosin (FLOMAX) 0.4 MG capsule 24 hr capsule, Take 1 capsule by mouth every night at bedtime., Disp: , Rfl:     vitamin C (ASCORBIC ACID) 250 MG tablet, Take 2 tablets by mouth 2 (Two) Times a Day., Disp: , Rfl:     Diclofenac Sodium (VOLTAREN) 1 % gel gel, Apply 4g to affected area 3-4 times per day (Patient not taking: Reported on 9/29/2023), Disp: 100 g, Rfl: 1    Allergies: Ace inhibitors, Cefuroxime, and Nitrofurantoin    Physical Exam  Constitutional:       Appearance: He is obese.   HENT:      Head: Normocephalic.      Right Ear: External ear normal.      Left Ear: External ear normal.      Nose: Nose normal.   Eyes:      Pupils: Pupils are equal, round, and reactive to light.   Cardiovascular:      Rate and Rhythm: Normal rate and regular rhythm.      Heart sounds: Normal heart sounds.   Pulmonary:      Effort: Pulmonary effort is normal.      Breath sounds: Normal breath sounds.   Musculoskeletal:      Cervical back: Normal range of motion.   Skin:     Comments: Focused exam with right forearm dermatofibroma with central dimple.  Discussed treatment options and given it is symptomatic and painful at times he would like cryotherapy today.  Cryotherapy completed of this 4 mm lesion with 3 freeze thaw cycles.  No complications.  Aftercare instructions reviewed.  Follow-up ongoing with dermatology.   Neurological:      General: No focal deficit present.      Mental Status: He is alert.   Psychiatric:         Mood and Affect: Mood normal.         Behavior: Behavior normal.         Thought Content: Thought content normal.        Result Review          Cryotherapy, Skin Lesion    Date/Time: 9/29/2023 12:33 PM  Performed by: Mathew Olivarez MD  Authorized by: Mathew Olivarez MD   Local anesthesia used: no    Anesthesia:  Local anesthesia used: no    Sedation:  Patient sedated: no    Patient tolerance:  patient tolerated the procedure well with no immediate complications  Comments: Focused exam with right forearm dermatofibroma with central dimple.  Discussed treatment options and given it is symptomatic and painful at times he would like cryotherapy today.  Cryotherapy completed of this 4 mm lesion with 3 freeze thaw cycles.  No complications.  Aftercare instructions reviewed.  Follow-up ongoing with dermatology.            Assessment and Plan  Diagnoses and all orders for this visit:    1. Essential hypertension (Primary)  Assessment & Plan:  Hypertension is improving with treatment.  Continue current treatment regimen.  Blood pressure will be reassessed at the next regular appointment.    Orders:  -     carvedilol (COREG) 25 MG tablet; Take 1 tablet by mouth 2 (Two) Times a Day.  -     losartan (COZAAR) 100 MG tablet; Take 1 tablet by mouth Daily.  -     Comprehensive Metabolic Panel; Future  -     Lipid Panel; Future    2. Hyperglycemia  Assessment & Plan:  Reviewed stable A1c with lab work and encouraged ongoing diet and exercise efforts to help prevent the progression of diabetes.    He is motivated to work on diet and exercise to help prevent diabetes progression    Orders:  -     Hemoglobin A1c; Future    3. BPH associated with nocturia  Assessment & Plan:  Continues Flomax     Orders:  -     tamsulosin (FLOMAX) 0.4 MG capsule 24 hr capsule; Take 1 capsule by mouth every night at bedtime.    4. Chronic diastolic (congestive) heart failure  Assessment & Plan:  Congestive heart failure due to hypertension.  Heart failure is improving with treatment.  NYHA Class I.  Continue current treatment regimen.  Heart failure will be reassessed in 6 months.    Orders:  -     aspirin 81 MG EC tablet; Take 1 tablet by mouth Daily.  -     carvedilol (COREG) 25 MG tablet; Take 1 tablet by mouth 2 (Two) Times a Day.  -     losartan (COZAAR) 100 MG tablet; Take 1 tablet by mouth Daily.  -     rosuvastatin (CRESTOR) 10 MG  tablet; Take 1 tablet by mouth Daily.  Dispense: 90 tablet; Refill: 1    5. Hyperlipidemia LDL goal <100  Assessment & Plan:  Lipid abnormalities are improving with treatment.  Pharmacotherapy as ordered.  Lipids will be reassessed in 6 months.    Orders:  -     rosuvastatin (CRESTOR) 10 MG tablet; Take 1 tablet by mouth Daily.  Dispense: 90 tablet; Refill: 1  -     Comprehensive Metabolic Panel; Future  -     Lipid Panel; Future    6. Dermatofibroma of forearm, right  Assessment & Plan:  Focused exam with right forearm dermatofibroma with central dimple.  Discussed treatment options and given it is symptomatic and painful at times he would like cryotherapy today.  Cryotherapy completed of this 4 mm lesion with 3 freeze thaw cycles.  No complications.  Aftercare instructions reviewed.  Follow-up ongoing with dermatology.    Orders:  -     Cryotherapy, Skin Lesion    7. Class 1 obesity due to excess calories with serious comorbidity and body mass index (BMI) of 32.0 to 32.9 in adult  Assessment & Plan:  Patient's (Body mass index is 32.8 kg/m².) indicates that they are obese (BMI >30) with health conditions that include hypertension, dyslipidemias, and osteoarthritis . Weight is improving with lifestyle modifications. BMI  is above average; BMI management plan is completed. We discussed portion control and increasing exercise.                      Follow Up  Return in about 4 months (around 1/29/2024) for Med recheck, Fasting labs 1 week before apt (Drink water).    Mathew Olivarez MD

## 2023-11-08 ENCOUNTER — OFFICE VISIT (OUTPATIENT)
Dept: CARDIOLOGY | Facility: CLINIC | Age: 78
End: 2023-11-08
Payer: MEDICARE

## 2023-11-08 VITALS
WEIGHT: 250 LBS | HEART RATE: 67 BPM | DIASTOLIC BLOOD PRESSURE: 54 MMHG | OXYGEN SATURATION: 96 % | SYSTOLIC BLOOD PRESSURE: 124 MMHG | HEIGHT: 74 IN | BODY MASS INDEX: 32.08 KG/M2

## 2023-11-08 DIAGNOSIS — I10 ESSENTIAL HYPERTENSION: Primary | Chronic | ICD-10-CM

## 2023-11-08 DIAGNOSIS — E78.5 HYPERLIPIDEMIA LDL GOAL <100: ICD-10-CM

## 2023-11-08 DIAGNOSIS — I27.20 PULMONARY HYPERTENSION: ICD-10-CM

## 2023-11-08 DIAGNOSIS — G47.33 OSA (OBSTRUCTIVE SLEEP APNEA): ICD-10-CM

## 2023-11-08 DIAGNOSIS — I50.32 CHRONIC DIASTOLIC (CONGESTIVE) HEART FAILURE: ICD-10-CM

## 2023-11-08 NOTE — PROGRESS NOTES
OFFICE VISIT  NOTE  [facility] CARDIOLOGY      Name: Werner Brower    Date: 2023  MRN:  [id number]  :  1945      REFERRING/PRIMARY PROVIDER:  Mathew Olivarez MD    Chief Complaint   Patient presents with    Essential hypertension       HPI: Werner Brower is a 78 y.o. male who presents for pulmonary hypertension, and diastolic heart failure.  History of hypertension, hyperlipidemia, elevated prostate specific antigen.  2022 urinary tract infection, was sent home from [facility] 1 day then went back the next day and was admitted for 5 days, but was very swollen at the end of that hospitalization, was discharged anyway, his son took him to the [facility], where they diuresed him, per PCP note echo showed grade 2 diastolic dysfunction, mild EF, normal EF, moderate pulmonary hypertension RVSP 55 to 61 mmHg.  Had a stress test and echo in  with Dr. Duncan, it showed mildly reversible inferior defect, no follow-up cardiac catheterization was recommended.  Overall doing well, denies chest pain or significant shortness of breath.  Stays active but does not routinely exercise.    Past Medical History:   Diagnosis Date    Allergic several years ago    Arrhythmia     Arthritis     Back pain     BPH associated with nocturia     Cervical disc disorder     Colon polyp several years ago    Condition not found     BROKEN ELBOW    Erectile dysfunction 3 years ago    Essential hypertension     Gallbladder problem     Gonarthrosis     BILATERAL    H/O colonoscopy     Heart murmur many years ago    High risk medication use     DRUG THERAPY FINDING    History of circumcision     HL (hearing loss) many years ago    Hyperlipidemia many years ago    Knee sprain     Migraine headache     Peptic ulceration     RLS (restless legs syndrome)     Rotator cuff syndrome     Skin problem     Urinary tract infection several times 4 years ago    Visual impairment many years ago       Past Surgical  History:   Procedure Laterality Date    BLEPHAROPLASTY      CHOLECYSTECTOMY      CIRCUMCISION      COLONOSCOPY  2010    SENSILE POLYPS 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, EXT HEMMORHOIDS REPEAT 3 YEARS ([facility])    ELBOW ARTHROSCOPY Left     ELBOW PROCEDURE      JOINT REPLACEMENT  right knee 2017    REPLACEMENT TOTAL KNEE Right        Social History     Socioeconomic History    Marital status:    Tobacco Use    Smoking status: Former     Types: Cigars     Quit date:      Years since quittin.8    Smokeless tobacco: Never   Vaping Use    Vaping Use: Never used   Substance and Sexual Activity    Alcohol use: Not Currently    Drug use: Never    Sexual activity: Not Currently     Partners: Female       Family History   Problem Relation Age of Onset    Hearing loss Mother     Hearing loss Father         ROS:   Constitutional no fever,  no weight loss   Skin no rash, no subcutaneous nodules   Otolaryngeal no difficulty swallowing   Cardiovascular See HPI   Pulmonary no cough, no sputum production   Gastrointestinal no constipation, no diarrhea   Genitourinary no dysuria, no hematuria   Hematologic no easy bruisability, no abnormal bleeding   Musculoskeletal no muscle pain   Neurologic no dizziness, no falls         Allergies   Allergen Reactions    Ace Inhibitors Unknown - High Severity     Other reaction(s): cough    Cefuroxime GI Intolerance    Nitrofurantoin Unknown - High Severity         Current Outpatient Medications:     aspirin 81 MG EC tablet, Take 1 tablet by mouth Daily., Disp: , Rfl:     carboxymethylcellulose (REFRESH PLUS) 0.5 % solution, Daily As Needed for Dry Eyes., Disp: , Rfl:     carvedilol (COREG) 25 MG tablet, Take 1 tablet by mouth 2 (Two) Times a Day., Disp: , Rfl:     Diclofenac Sodium (VOLTAREN) 1 % gel gel, Apply 4 g topically to the appropriate area as directed 4 (Four) Times a Day As Needed., Disp: , Rfl:     losartan (COZAAR) 100 MG tablet, Take 1 tablet by mouth Daily., Disp:  ", Rfl:     multivitamin with minerals tablet tablet, Take 1 tablet by mouth Daily., Disp: , Rfl:     multivitamins-minerals (PRESERVISION AREDS 2) capsule capsule, Daily., Disp: , Rfl:     rosuvastatin (CRESTOR) 10 MG tablet, Take 1 tablet by mouth Daily., Disp: 90 tablet, Rfl: 1    tamsulosin (FLOMAX) 0.4 MG capsule 24 hr capsule, Take 1 capsule by mouth every night at bedtime., Disp: , Rfl:     vitamin C (ASCORBIC ACID) 250 MG tablet, Take 2 tablets by mouth 2 (Two) Times a Day., Disp: , Rfl:     Vitals:    11/08/23 1301   BP: 124/54   BP Location: Right arm   Patient Position: Sitting   Pulse: 67   SpO2: 96%   Weight: 113 kg (250 lb)   Height: 186.7 cm (73.5\")     Body mass index is 32.54 kg/m².    PHYSICAL EXAM:    General Appearance:   well developed  well nourished  HENT:   oropharynx moist  lips not cyanotic  Neck:  thyroid not enlarged  supple  Respiratory:  no respiratory distress  normal breath sounds  no rales  Cardiovascular:  no jugular venous distention  regular rhythm  apical impulse normal  S1 normal, S2 normal  no S3, no S4   no murmur  no rub, no thrill  carotid pulses normal; no bruit  lower extremity edema: none      Musculoskeletal:  no clubbing of fingers.   normocephalic, head atraumatic  Skin:   warm, dry  Psychiatric:  judgement and insight appropriate  normal mood and affect    RESULTS:   Procedures          Labs:  Lab Results   Component Value Date    TRIG 124 09/22/2023    HDL 35 (L) 09/22/2023    LDL 43 09/22/2023    AST 18 09/22/2023    ALT 17 09/22/2023     Lab Results   Component Value Date    HGBA1C 6.3 (H) 09/22/2023       Most recent PCP note, imaging tests, and labs reviewed.    ASSESSMENT:  Problem List Items Addressed This Visit       Essential hypertension - Primary (Chronic)    Hyperlipidemia LDL goal <100    Pulmonary hypertension    Chronic diastolic (congestive) heart failure    KIRSTIN (obstructive sleep apnea)       PLAN:    1.  Chronic diastolic heart failure:  Echo from [facility] " 11/2022 showed EF greater than 55% with grade 2 diastolic dysfunction, mild LVH, and moderate pulm hypertension RVSP 55 to 61 mmHg.    Agree with carvedilol and losartan  Advise low-sodium diet and increase aerobic exercise  If he has shortness of breath in the future I would consider adding Jardiance and low-dose loop diuretic.    2.  Abnormal nuclear stress test:  Stress test from 2021 reviewed, small reversible inferior defect noted by Dr. Duncan, no further testing recommended at that time.  He is asymptomatic currently with no angina or progressive shortness of breath therefore will defer invasive cardiac assessment.  Advised patient to call us if he has any chest pain we will proceed with left heart catheterization.  Continue aspirin and statin    3.  Hyperlipidemia:  Well-controlled on current regimen  Continue rosuvastatin at current dose    4.  Hypertension:  Slightly elevated in office today but he reports home blood pressure running 120s 130s systolic.  Continue current medical therapy.    5.  Ascending aortic aneurysm:  4 cm 11/2022 on echo at [facility]  Repeat echo for surveillance    6.  Sleep apnea:  Discussed importance of treating sleep apnea to improve pulmonary hypertension    7.  Moderate pulmonary hypertension:  Echo 11/2022 at [facility] showed normal EF with moderate pulmonary hypertension RVSP 55 to 61 mmHg.  Treat sleep apnea  Continue exercise and blood pressure control    If symptoms worsen we may proceed with right and left heart catheterization in the future.    Advance Care Planning   ACP discussion was held with the patient during this visit. Patient does not have an advance directive, information provided.         Return to clinic in 12 months, or sooner as needed.    Thank you for the opportunity to share in the care of your patient; please do not hesitate to call me with any questions.     Delfino Schmitt MD, FACC  Office: [phone] [address]  [address]    11/08/23

## 2023-12-09 ENCOUNTER — OFFICE VISIT (OUTPATIENT)
Dept: FAMILY MEDICINE CLINIC | Facility: CLINIC | Age: 78
End: 2023-12-09
Payer: MEDICARE

## 2023-12-09 VITALS
SYSTOLIC BLOOD PRESSURE: 124 MMHG | HEIGHT: 74 IN | RESPIRATION RATE: 17 BRPM | DIASTOLIC BLOOD PRESSURE: 68 MMHG | BODY MASS INDEX: 32.29 KG/M2 | HEART RATE: 84 BPM | OXYGEN SATURATION: 94 % | WEIGHT: 251.56 LBS

## 2023-12-09 DIAGNOSIS — J00 ACUTE NASOPHARYNGITIS: Primary | ICD-10-CM

## 2023-12-09 PROCEDURE — 3074F SYST BP LT 130 MM HG: CPT | Performed by: PHYSICIAN ASSISTANT

## 2023-12-09 PROCEDURE — 3078F DIAST BP <80 MM HG: CPT | Performed by: PHYSICIAN ASSISTANT

## 2023-12-09 PROCEDURE — 99213 OFFICE O/P EST LOW 20 MIN: CPT | Performed by: PHYSICIAN ASSISTANT

## 2023-12-09 PROCEDURE — 1159F MED LIST DOCD IN RCRD: CPT | Performed by: PHYSICIAN ASSISTANT

## 2023-12-09 PROCEDURE — 1160F RVW MEDS BY RX/DR IN RCRD: CPT | Performed by: PHYSICIAN ASSISTANT

## 2023-12-09 RX ORDER — METHYLPREDNISOLONE 4 MG/1
TABLET ORAL
Qty: 19 TABLET | Refills: 0 | Status: SHIPPED | OUTPATIENT
Start: 2023-12-09 | End: 2023-12-19

## 2023-12-09 RX ORDER — DEXTROMETHORPHAN HYDROBROMIDE AND PROMETHAZINE HYDROCHLORIDE 15; 6.25 MG/5ML; MG/5ML
5 SYRUP ORAL 4 TIMES DAILY PRN
Qty: 180 ML | Refills: 0 | Status: SHIPPED | OUTPATIENT
Start: 2023-12-09

## 2023-12-09 NOTE — PROGRESS NOTES
"Chief Complaint  Cough (Patient presents today with complaint of nasal congestion, cough, and sore throat for about 3-4 days. He denies fever, body aches, or chills. He states he was out in the cold ran earlier this week. ), Sore Throat, and Nasal Congestion    Subjective        Werner Brower presents to [facility]  History of Present Illness  Patient reports today secondary to not feeling well.  Patient reports nasal congestion, sore throat and cough.  Patient reports the symptoms are getting worse.  Patient denies any fever or chills.  Patient reports no shortness of breath or difficulty breathing.  Patient states he has been using Flonase.  Cough  Associated symptoms include a sore throat.   Sore Throat   Associated symptoms include coughing.       Objective   Vital Signs:  /68 (BP Location: Right arm, Patient Position: Sitting, Cuff Size: Adult)   Pulse 84   Resp 17   Ht 186.7 cm (73.5\")   Wt 114 kg (251 lb 9 oz)   SpO2 94%   BMI 32.74 kg/m²   Estimated body mass index is 32.74 kg/m² as calculated from the following:    Height as of this encounter: 186.7 cm (73.5\").    Weight as of this encounter: 114 kg (251 lb 9 oz).               Physical Exam  Vitals and nursing note reviewed.   Constitutional:       General: He is not in acute distress.     Appearance: Normal appearance.   HENT:      Head: Normocephalic.      Right Ear: Hearing, tympanic membrane, ear canal and external ear normal.      Left Ear: Hearing, tympanic membrane, ear canal and external ear normal.      Nose: Congestion present.      Mouth/Throat:      Pharynx: Posterior oropharyngeal erythema present.   Eyes:      Pupils: Pupils are equal, round, and reactive to light.   Cardiovascular:      Rate and Rhythm: Normal rate and regular rhythm.   Pulmonary:      Effort: Pulmonary effort is normal. No respiratory distress.   Skin:     General: Skin is warm and dry.   Neurological:      Mental Status: He is " alert.   Psychiatric:         Mood and Affect: Mood normal.        Result Review :                   Assessment and Plan   Diagnoses and all orders for this visit:    1. Acute nasopharyngitis (Primary)  Assessment & Plan:  Patient is steroid taper and Promethazine DM.  Advised him to increase fluids as rated.  Take over-the-counter medication as needed.  Signs of worsening symptoms and advise ER should they occur.  Patient knowledge understanding.    Orders:  -     methylPREDNISolone (MEDROL) 4 MG tablet; Take 3 tablets by mouth Daily for 3 days, THEN 2 tablets Daily for 3 days, THEN 1 tablet Daily for 4 days. With food  Dispense: 19 tablet; Refill: 0  -     promethazine-dextromethorphan (PROMETHAZINE-DM) 6.25-15 MG/5ML syrup; Take 5 mL by mouth 4 (Four) Times a Day As Needed for Cough. Caution sedation  Dispense: 180 mL; Refill: 0             Follow Up   Return if symptoms worsen or fail to improve.  Patient was given instructions and counseling regarding his condition or for health maintenance advice. Please see specific information pulled into the AVS if appropriate.

## 2023-12-09 NOTE — ASSESSMENT & PLAN NOTE
Patient is steroid taper and Promethazine DM.  Advised him to increase fluids as rated.  Take over-the-counter medication as needed.  Signs of worsening symptoms and advise ER should they occur.  Patient knowledge understanding.

## 2023-12-16 ENCOUNTER — OFFICE VISIT (OUTPATIENT)
Dept: FAMILY MEDICINE CLINIC | Facility: CLINIC | Age: 78
End: 2023-12-16
Payer: MEDICARE

## 2023-12-16 VITALS
OXYGEN SATURATION: 92 % | DIASTOLIC BLOOD PRESSURE: 68 MMHG | WEIGHT: 249.7 LBS | SYSTOLIC BLOOD PRESSURE: 126 MMHG | BODY MASS INDEX: 32.05 KG/M2 | HEART RATE: 64 BPM | HEIGHT: 74 IN

## 2023-12-16 DIAGNOSIS — R05.9 COUGH, UNSPECIFIED TYPE: ICD-10-CM

## 2023-12-16 DIAGNOSIS — J01.00 ACUTE NON-RECURRENT MAXILLARY SINUSITIS: Primary | ICD-10-CM

## 2023-12-16 LAB
EXPIRATION DATE: NORMAL
FLUAV AG UPPER RESP QL IA.RAPID: NOT DETECTED
FLUBV AG UPPER RESP QL IA.RAPID: NOT DETECTED
INTERNAL CONTROL: NORMAL
Lab: NORMAL
SARS-COV-2 AG UPPER RESP QL IA.RAPID: NOT DETECTED

## 2023-12-16 RX ORDER — TRIAMCINOLONE ACETONIDE 40 MG/ML
80 INJECTION, SUSPENSION INTRA-ARTICULAR; INTRAMUSCULAR ONCE
Status: COMPLETED | OUTPATIENT
Start: 2023-12-16 | End: 2023-12-16

## 2023-12-16 RX ORDER — AMOXICILLIN AND CLAVULANATE POTASSIUM 875; 125 MG/1; MG/1
1 TABLET, FILM COATED ORAL 2 TIMES DAILY
Qty: 20 TABLET | Refills: 0 | Status: SHIPPED | OUTPATIENT
Start: 2023-12-16

## 2023-12-16 RX ORDER — DEXTROMETHORPHAN HYDROBROMIDE AND PROMETHAZINE HYDROCHLORIDE 15; 6.25 MG/5ML; MG/5ML
5 SYRUP ORAL 4 TIMES DAILY PRN
Qty: 240 ML | Refills: 0 | Status: SHIPPED | OUTPATIENT
Start: 2023-12-16

## 2023-12-16 RX ADMIN — TRIAMCINOLONE ACETONIDE 80 MG: 40 INJECTION, SUSPENSION INTRA-ARTICULAR; INTRAMUSCULAR at 11:05

## 2023-12-16 NOTE — PROGRESS NOTES
Date: 2023   Patient Name: Werner Brower  : 1945   MRN: [id number]     Chief Complaint:    Chief Complaint   Patient presents with    Sinusitis     Almost finished with Prednisone, still no relief        History of Present Illness: Werner Brower is a 78 y.o. male who is here today for Sinusitis  Associated symptoms include congestion, coughing and sinus pressure. Pertinent negatives include no shortness of breath.            Review of Systems:   Review of Systems   Constitutional:  Negative for fatigue.   HENT:  Positive for congestion, rhinorrhea and sinus pressure.    Eyes:  Negative for blurred vision.   Respiratory:  Positive for cough. Negative for chest tightness and shortness of breath.    Cardiovascular:  Negative for chest pain, palpitations and leg swelling.   Gastrointestinal:  Negative for abdominal pain, constipation, diarrhea, nausea and vomiting.   Neurological:  Negative for dizziness, tremors and headache.   Psychiatric/Behavioral:  Negative for depressed mood. The patient is not nervous/anxious.        Past Medical History:   Past Medical History:   Diagnosis Date    Allergic several years ago    Arrhythmia     Arthritis     Back pain     BPH associated with nocturia     Cervical disc disorder     Colon polyp several years ago    Condition not found     BROKEN ELBOW    Erectile dysfunction 3 years ago    Essential hypertension     Gallbladder problem     Gonarthrosis     BILATERAL    H/O colonoscopy     Heart murmur many years ago    High risk medication use     DRUG THERAPY FINDING    History of circumcision     HL (hearing loss) many years ago    Hyperlipidemia many years ago    Knee sprain     Migraine headache     Peptic ulceration     RLS (restless legs syndrome)     Rotator cuff syndrome     Skin problem     Urinary tract infection several times 4 years ago    Visual impairment many years ago       Past Surgical History:   Past Surgical History:   Procedure  Laterality Date    BLEPHAROPLASTY      CHOLECYSTECTOMY      CIRCUMCISION      COLONOSCOPY  2010    SENSILE POLYPS 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, EXT HEMMORHOIDS REPEAT 3 YEARS ([facility])    ELBOW ARTHROSCOPY Left     ELBOW PROCEDURE      JOINT REPLACEMENT  right knee 2017    REPLACEMENT TOTAL KNEE Right        Family History:   Family History   Problem Relation Age of Onset    Hearing loss Mother     Hearing loss Father        Social History:   Social History     Socioeconomic History    Marital status:    Tobacco Use    Smoking status: Former     Types: Cigars     Quit date:      Years since quittin.9    Smokeless tobacco: Never   Vaping Use    Vaping Use: Never used   Substance and Sexual Activity    Alcohol use: Not Currently    Drug use: Never    Sexual activity: Not Currently     Partners: Female       Medications:     Current Outpatient Medications:     aspirin 81 MG EC tablet, Take 1 tablet by mouth Daily., Disp: , Rfl:     carboxymethylcellulose (REFRESH PLUS) 0.5 % solution, Daily As Needed for Dry Eyes., Disp: , Rfl:     carvedilol (COREG) 25 MG tablet, Take 1 tablet by mouth 2 (Two) Times a Day., Disp: , Rfl:     Diclofenac Sodium (VOLTAREN) 1 % gel gel, Apply 4 g topically to the appropriate area as directed 4 (Four) Times a Day As Needed., Disp: , Rfl:     losartan (COZAAR) 100 MG tablet, Take 1 tablet by mouth Daily., Disp: , Rfl:     multivitamin with minerals tablet tablet, Take 1 tablet by mouth Daily., Disp: , Rfl:     multivitamins-minerals (PRESERVISION AREDS 2) capsule capsule, Daily., Disp: , Rfl:     rosuvastatin (CRESTOR) 10 MG tablet, Take 1 tablet by mouth Daily., Disp: 90 tablet, Rfl: 1    tamsulosin (FLOMAX) 0.4 MG capsule 24 hr capsule, Take 1 capsule by mouth every night at bedtime., Disp: , Rfl:     vitamin C (ASCORBIC ACID) 250 MG tablet, Take 2 tablets by mouth 2 (Two) Times a Day., Disp: , Rfl:     amoxicillin-clavulanate (AUGMENTIN) 875-125 MG per tablet, Take 1  "tablet by mouth 2 (Two) Times a Day., Disp: 20 tablet, Rfl: 0    promethazine-dextromethorphan (PROMETHAZINE-DM) 6.25-15 MG/5ML syrup, Take 5 mL by mouth 4 (Four) Times a Day As Needed for Cough., Disp: 240 mL, Rfl: 0  No current facility-administered medications for this visit.    Allergies:   Allergies   Allergen Reactions    Ace Inhibitors Unknown - High Severity     Other reaction(s): cough    Cefuroxime GI Intolerance    Nitrofurantoin Unknown - High Severity         Physical Exam:  Vital Signs:   Vitals:    12/16/23 1046   BP: 126/68   BP Location: Right arm   Patient Position: Sitting   Cuff Size: Large Adult   Pulse: 64   SpO2: 92%   Weight: 113 kg (249 lb 11.2 oz)   Height: 186.7 cm (73.5\")     Body mass index is 32.5 kg/m².     Physical Exam  Vitals and nursing note reviewed.   Constitutional:       Appearance: Normal appearance.   HENT:      Right Ear: Tympanic membrane and ear canal normal.      Left Ear: Tympanic membrane and ear canal normal.      Nose: Congestion present.      Right Sinus: Maxillary sinus tenderness present. No frontal sinus tenderness.      Left Sinus: Maxillary sinus tenderness present. No frontal sinus tenderness.   Cardiovascular:      Rate and Rhythm: Normal rate and regular rhythm.   Pulmonary:      Effort: Pulmonary effort is normal.      Breath sounds: Normal breath sounds.   Neurological:      Mental Status: He is alert and oriented to person, place, and time.   Psychiatric:         Mood and Affect: Mood normal.         Behavior: Behavior normal.           Assessment/Plan:   Diagnoses and all orders for this visit:    1. Acute non-recurrent maxillary sinusitis (Primary)  Assessment & Plan:  Rx Augmentin and Promethazine DM, patient received Kenalog in office.  He will call if symptoms are persistent or worsening    Orders:  -     amoxicillin-clavulanate (AUGMENTIN) 875-125 MG per tablet; Take 1 tablet by mouth 2 (Two) Times a Day.  Dispense: 20 tablet; Refill: 0  -     " triamcinolone acetonide (KENALOG-40) injection 80 mg  -     promethazine-dextromethorphan (PROMETHAZINE-DM) 6.25-15 MG/5ML syrup; Take 5 mL by mouth 4 (Four) Times a Day As Needed for Cough.  Dispense: 240 mL; Refill: 0    2. Cough, unspecified type  -     POCT SARS-CoV-2 + Flu Antigen ROSALIE           Follow Up:   Return if symptoms worsen or fail to improve.    Maria C Lockwood,   [facility]

## 2023-12-16 NOTE — ASSESSMENT & PLAN NOTE
COVID and flu negative.  Rx Augmentin and Promethazine DM, patient received Kenalog in office.  He will call if symptoms are persistent or worsening

## 2024-01-22 ENCOUNTER — LAB (OUTPATIENT)
Dept: FAMILY MEDICINE CLINIC | Facility: CLINIC | Age: 79
End: 2024-01-22
Payer: MEDICARE

## 2024-01-22 DIAGNOSIS — E78.5 HYPERLIPIDEMIA LDL GOAL <100: ICD-10-CM

## 2024-01-22 DIAGNOSIS — R73.9 HYPERGLYCEMIA: Chronic | ICD-10-CM

## 2024-01-22 DIAGNOSIS — I10 ESSENTIAL HYPERTENSION: Chronic | ICD-10-CM

## 2024-01-22 PROCEDURE — 36415 COLL VENOUS BLD VENIPUNCTURE: CPT | Performed by: FAMILY MEDICINE

## 2024-01-23 LAB
ALBUMIN SERPL-MCNC: 4.1 G/DL (ref 3.8–4.8)
ALBUMIN/GLOB SERPL: 1.2 {RATIO} (ref 1.2–2.2)
ALP SERPL-CCNC: 61 IU/L (ref 44–121)
ALT SERPL-CCNC: 22 IU/L (ref 0–44)
AST SERPL-CCNC: 18 IU/L (ref 0–40)
BILIRUB SERPL-MCNC: 0.6 MG/DL (ref 0–1.2)
BUN SERPL-MCNC: 15 MG/DL (ref 8–27)
BUN/CREAT SERPL: 14 (ref 10–24)
CALCIUM SERPL-MCNC: 9.4 MG/DL (ref 8.6–10.2)
CHLORIDE SERPL-SCNC: 101 MMOL/L (ref 96–106)
CHOLEST SERPL-MCNC: 113 MG/DL (ref 100–199)
CO2 SERPL-SCNC: 25 MMOL/L (ref 20–29)
CREAT SERPL-MCNC: 1.04 MG/DL (ref 0.76–1.27)
EGFRCR SERPLBLD CKD-EPI 2021: 74 ML/MIN/1.73
GLOBULIN SER CALC-MCNC: 3.3 G/DL (ref 1.5–4.5)
GLUCOSE SERPL-MCNC: 99 MG/DL (ref 70–99)
HBA1C MFR BLD: 6.4 % (ref 4.8–5.6)
HDLC SERPL-MCNC: 46 MG/DL
LDLC SERPL CALC-MCNC: 47 MG/DL (ref 0–99)
POTASSIUM SERPL-SCNC: 4.9 MMOL/L (ref 3.5–5.2)
PROT SERPL-MCNC: 7.4 G/DL (ref 6–8.5)
SODIUM SERPL-SCNC: 139 MMOL/L (ref 134–144)
TRIGL SERPL-MCNC: 109 MG/DL (ref 0–149)
VLDLC SERPL CALC-MCNC: 20 MG/DL (ref 5–40)

## 2024-02-22 ENCOUNTER — OFFICE VISIT (OUTPATIENT)
Dept: FAMILY MEDICINE CLINIC | Facility: CLINIC | Age: 79
End: 2024-02-22
Payer: MEDICARE

## 2024-02-22 VITALS
DIASTOLIC BLOOD PRESSURE: 68 MMHG | OXYGEN SATURATION: 99 % | HEART RATE: 61 BPM | BODY MASS INDEX: 33.06 KG/M2 | SYSTOLIC BLOOD PRESSURE: 132 MMHG | WEIGHT: 254 LBS

## 2024-02-22 DIAGNOSIS — N40.1 BPH ASSOCIATED WITH NOCTURIA: Chronic | ICD-10-CM

## 2024-02-22 DIAGNOSIS — R73.9 HYPERGLYCEMIA: Chronic | ICD-10-CM

## 2024-02-22 DIAGNOSIS — I50.32 CHRONIC DIASTOLIC (CONGESTIVE) HEART FAILURE: ICD-10-CM

## 2024-02-22 DIAGNOSIS — I10 ESSENTIAL HYPERTENSION: Chronic | ICD-10-CM

## 2024-02-22 DIAGNOSIS — Z00.00 GENERAL MEDICAL EXAM: Primary | ICD-10-CM

## 2024-02-22 DIAGNOSIS — E78.5 HYPERLIPIDEMIA LDL GOAL <100: ICD-10-CM

## 2024-02-22 DIAGNOSIS — M17.0 PRIMARY OSTEOARTHRITIS OF BOTH KNEES: ICD-10-CM

## 2024-02-22 DIAGNOSIS — R35.1 BPH ASSOCIATED WITH NOCTURIA: Chronic | ICD-10-CM

## 2024-02-22 DIAGNOSIS — I27.20 PULMONARY HYPERTENSION: ICD-10-CM

## 2024-02-22 DIAGNOSIS — R97.20 ELEVATED PSA: ICD-10-CM

## 2024-02-22 DIAGNOSIS — G47.33 OSA (OBSTRUCTIVE SLEEP APNEA): ICD-10-CM

## 2024-02-22 DIAGNOSIS — E66.09 CLASS 1 OBESITY DUE TO EXCESS CALORIES WITH SERIOUS COMORBIDITY AND BODY MASS INDEX (BMI) OF 33.0 TO 33.9 IN ADULT: ICD-10-CM

## 2024-02-22 PROBLEM — D23.61: Status: RESOLVED | Noted: 2023-09-29 | Resolved: 2024-02-22

## 2024-02-22 PROBLEM — K21.9 GERD WITHOUT ESOPHAGITIS: Status: RESOLVED | Noted: 2022-12-12 | Resolved: 2024-02-22

## 2024-02-22 PROBLEM — J01.00 ACUTE NON-RECURRENT MAXILLARY SINUSITIS: Status: RESOLVED | Noted: 2023-12-16 | Resolved: 2024-02-22

## 2024-02-22 PROBLEM — E87.79 VOLUME OVERLOAD STATE OF HEART: Status: RESOLVED | Noted: 2022-12-12 | Resolved: 2024-02-22

## 2024-02-22 PROBLEM — R05.9 COUGH: Status: RESOLVED | Noted: 2023-12-16 | Resolved: 2024-02-22

## 2024-02-22 PROBLEM — J00 ACUTE NASOPHARYNGITIS: Status: RESOLVED | Noted: 2023-12-09 | Resolved: 2024-02-22

## 2024-02-22 PROBLEM — Z12.5 PROSTATE CANCER SCREENING: Status: RESOLVED | Noted: 2022-12-12 | Resolved: 2024-02-22

## 2024-02-22 PROBLEM — M17.12 PRIMARY OSTEOARTHRITIS OF LEFT KNEE: Status: RESOLVED | Noted: 2023-05-31 | Resolved: 2024-02-22

## 2024-02-22 PROBLEM — E66.9 OBESITY (BMI 30-39.9): Status: RESOLVED | Noted: 2023-03-06 | Resolved: 2024-02-22

## 2024-02-22 RX ORDER — LOSARTAN POTASSIUM 100 MG/1
100 TABLET ORAL DAILY
Start: 2024-02-22

## 2024-02-22 RX ORDER — ASPIRIN 81 MG/1
81 TABLET ORAL DAILY
Start: 2024-02-22

## 2024-02-22 RX ORDER — ROSUVASTATIN CALCIUM 10 MG/1
10 TABLET, COATED ORAL DAILY
Qty: 90 TABLET | Refills: 1 | Status: SHIPPED | OUTPATIENT
Start: 2024-02-22

## 2024-02-22 RX ORDER — TAMSULOSIN HYDROCHLORIDE 0.4 MG/1
1 CAPSULE ORAL
Start: 2024-02-22

## 2024-02-22 RX ORDER — CARVEDILOL 25 MG/1
25 TABLET ORAL 2 TIMES DAILY
Start: 2024-02-22

## 2024-02-22 NOTE — PROGRESS NOTES
The ABCs of the Annual Wellness Visit  Subsequent Medicare Wellness Visit    Subjective    Werner Brower is a 78 y.o. male who presents for a Subsequent Medicare Wellness Visit.    The following portions of the patient's history were reviewed and   updated as appropriate: allergies, current medications, past family history, past medical history, past social history, past surgical history, and problem list.    Compared to one year ago, the patient feels his physical   health is better.    Compared to one year ago, the patient feels his mental   health is better.    Recent Hospitalizations:  He was not admitted to the hospital during the last year.       Current Medical Providers:  Patient Care Team:  Mathew Olivarez MD as PCP - General (Family Medicine)    Outpatient Medications Prior to Visit   Medication Sig Dispense Refill    carboxymethylcellulose (REFRESH PLUS) 0.5 % solution Daily As Needed for Dry Eyes.      Diclofenac Sodium (VOLTAREN) 1 % gel gel Apply 4 g topically to the appropriate area as directed 4 (Four) Times a Day As Needed.      multivitamins-minerals (PRESERVISION AREDS 2) capsule capsule Daily.      vitamin C (ASCORBIC ACID) 250 MG tablet Take 2 tablets by mouth 2 (Two) Times a Day.      aspirin 81 MG EC tablet Take 1 tablet by mouth Daily.      carvedilol (COREG) 25 MG tablet Take 1 tablet by mouth 2 (Two) Times a Day.      losartan (COZAAR) 100 MG tablet Take 1 tablet by mouth Daily.      rosuvastatin (CRESTOR) 10 MG tablet Take 1 tablet by mouth Daily. 90 tablet 1    tamsulosin (FLOMAX) 0.4 MG capsule 24 hr capsule Take 1 capsule by mouth every night at bedtime.      amoxicillin-clavulanate (AUGMENTIN) 875-125 MG per tablet Take 1 tablet by mouth 2 (Two) Times a Day. 20 tablet 0    multivitamin with minerals tablet tablet Take 1 tablet by mouth Daily.      promethazine-dextromethorphan (PROMETHAZINE-DM) 6.25-15 MG/5ML syrup Take 5 mL by mouth 4 (Four) Times a Day As Needed for Cough.  "240 mL 0     No facility-administered medications prior to visit.       No opioid medication identified on active medication list. I have reviewed chart for other potential  high risk medication/s and harmful drug interactions in the elderly.        Aspirin is on active medication list. Aspirin use is indicated based on review of current medical condition/s. Pros and cons of this therapy have been discussed today. Benefits of this medication outweigh potential harm.  Patient has been encouraged to continue taking this medication.  .      Patient Active Problem List   Diagnosis    BPH associated with nocturia    Essential hypertension    Primary osteoarthritis of both knees    Hyperlipidemia LDL goal <100    Pulmonary hypertension    Hyperglycemia    General medical exam    Elevated PSA    Chronic diastolic (congestive) heart failure    KIRSTIN (obstructive sleep apnea)    Class 1 obesity due to excess calories with serious comorbidity and body mass index (BMI) of 33.0 to 33.9 in adult     Advance Care Planning   Advance Care Planning     Advance Directive is not on file.  ACP discussion was held with the patient during this visit. Patient does not have an advance directive, information provided.     Objective    Vitals:    24 1229 24 1239   BP: 120/40 132/68   Pulse: 61    SpO2: 99%    Weight: 115 kg (254 lb)      Estimated body mass index is 33.06 kg/m² as calculated from the following:    Height as of 23: 186.7 cm (73.5\").    Weight as of this encounter: 115 kg (254 lb).           Does the patient have evidence of cognitive impairment? No    Lab Results   Component Value Date    CHLPL 113 2024    TRIG 109 2024    HDL 46 2024    LDL 47 2024    VLDL 20 2024    HGBA1C 6.4 (H) 2024        HEALTH RISK ASSESSMENT    Smoking Status:  Social History     Tobacco Use   Smoking Status Former    Types: Cigars    Quit date:     Years since quittin.1   Smokeless Tobacco " Never     Alcohol Consumption:  Social History     Substance and Sexual Activity   Alcohol Use Not Currently     Fall Risk Screen:    SHAILAADI Fall Risk Assessment was completed, and patient is at LOW risk for falls.Assessment completed on:2024    Depression Screenin/22/2024    12:34 PM   PHQ-2/PHQ-9 Depression Screening   Little Interest or Pleasure in Doing Things 0-->not at all   Feeling Down, Depressed or Hopeless 0-->not at all   PHQ-9: Brief Depression Severity Measure Score 0       Health Habits and Functional and Cognitive Screenin/22/2024    12:00 PM   Functional & Cognitive Status   Do you have difficulty preparing food and eating? No   Do you have difficulty bathing yourself, getting dressed or grooming yourself? No   Do you have difficulty using the toilet? No   Do you have difficulty moving around from place to place? No   Do you have trouble with steps or getting out of a bed or a chair? No   Current Diet Well Balanced Diet   Dental Exam Up to date   Eye Exam Up to date   Exercise (times per week) 5 times per week   Current Exercises Include Walking   Do you need help using the phone?  No   Are you deaf or do you have serious difficulty hearing?  No   Do you need help to go to places out of walking distance? No   Do you need help shopping? No   Do you need help preparing meals?  No   Do you need help with housework?  No   Do you need help with laundry? No   Do you need help taking your medications? No   Do you need help managing money? No   Do you ever drive or ride in a car without wearing a seat belt? No   Have you felt unusual stress, anger or loneliness in the last month? No   Who do you live with? Spouse   If you need help, do you have trouble finding someone available to you? No   Have you been bothered in the last four weeks by sexual problems? No   Do you have difficulty concentrating, remembering or making decisions? No       Age-appropriate Screening Schedule:  Refer to  the list below for future screening recommendations based on patient's age, sex and/or medical conditions. Orders for these recommended tests are listed in the plan section. The patient has been provided with a written plan.    Health Maintenance   Topic Date Due    LIPID PANEL  01/22/2025    ANNUAL WELLNESS VISIT  02/22/2025    BMI FOLLOWUP  02/22/2025    TDAP/TD VACCINES (2 - Td or Tdap) 05/29/2028    HEPATITIS C SCREENING  Completed    COVID-19 Vaccine  Completed    INFLUENZA VACCINE  Completed    Pneumococcal Vaccine 65+  Completed    RSV Vaccine - Adults  Discontinued    ZOSTER VACCINE  Discontinued    COLORECTAL CANCER SCREENING  Discontinued                  CMS Preventative Services Quick Reference  Risk Factors Identified During Encounter  Fall Risk-High or Moderate: Discussed Fall Prevention in the home  Immunizations Discussed/Encouraged: RSV (Respiratory Syncytial Virus)  The above risks/problems have been discussed with the patient.  Pertinent information has been shared with the patient in the After Visit Summary.  An After Visit Summary and PPPS were made available to the patient.    Follow Up:   Next Medicare Wellness visit to be scheduled in 1 year.       Additional E&M Note during same encounter follows:  Patient has multiple medical problems which are significant and separately identifiable that require additional work above and beyond the Medicare Wellness Visit.      Chief Complaint  med recheck and lab check    Subjective        HPI  Werner Brower is also being seen today for 6 month followup visit and medication refills     Doing well after our visit earlier this year for Medicare annual wellness.    Labs prior to today's visit with normal CMP, good lipid panel with LDL at 47, and A1c elevation at 6.4 (compared to 6.3 in May and Sept 2023)     He did have an episode of volume overload with heart failure that resulted from his volume overload but his ejection fraction returned to normal  after a solution of his volume overload episode.  His BNP returned normal after resolution of his acute volume overload.  He is following with Dr. Schmitt regarding his chronic diastolic heart failure.    KIRSTIN workup did show sleep apnea  - discussed again today - he declines treatment and does understand this can contribute to worsening pulm HTN and diastolic CHF.      He continues on carvedilol, losartan, low-dose aspirin, Crestor, and was taken off Vascepa by Cardiology.     Recent labs did show mild A1c elevation at 6.4 and he is working on diet and exercise before medications.     He is following with urology given past PSA elevation at 4.7.  He is now on Flomax for BPH symptoms and he does continues with PSA monitoring with Urology (Dr Fried)       Review of Systems   Constitutional:  Negative for activity change, appetite change, chills, fatigue and fever.   HENT:  Negative for ear pain, hearing loss and trouble swallowing.    Eyes:  Negative for pain and visual disturbance.   Respiratory:  Negative for cough, chest tightness, shortness of breath and wheezing.    Cardiovascular:  Negative for chest pain, palpitations and leg swelling.   Gastrointestinal:  Negative for abdominal pain and blood in stool.   Genitourinary:  Negative for difficulty urinating.   Musculoskeletal:  Positive for arthralgias. Negative for back pain and joint swelling.   Skin:  Negative for rash.   Neurological:  Negative for dizziness, weakness and light-headedness.   Psychiatric/Behavioral:  Negative for agitation, behavioral problems, dysphoric mood and sleep disturbance.        Objective   Vital Signs:  /68   Pulse 61   Wt 115 kg (254 lb)   SpO2 99%   BMI 33.06 kg/m²     Physical Exam  Constitutional:       Appearance: He is obese.   HENT:      Head: Normocephalic.      Right Ear: External ear normal.      Left Ear: External ear normal.      Nose: Nose normal.   Eyes:      Pupils: Pupils are equal, round, and reactive to  light.   Cardiovascular:      Rate and Rhythm: Normal rate and regular rhythm.      Heart sounds: Normal heart sounds.   Pulmonary:      Effort: Pulmonary effort is normal.      Breath sounds: Normal breath sounds.   Musculoskeletal:         General: Normal range of motion.      Cervical back: Normal range of motion.   Skin:     General: Skin is warm and dry.   Neurological:      General: No focal deficit present.      Mental Status: He is alert.   Psychiatric:         Mood and Affect: Mood normal.         Behavior: Behavior normal.         Thought Content: Thought content normal.                         Assessment and Plan   Diagnoses and all orders for this visit:    1. General medical exam (Primary)  Assessment & Plan:  Discussed together health maintenance and screening along with vaccination options and healthy diet and exercise habits as part of the preventative counseling at their physical exam today.     Encourage advance directive.    Reviewed together fasting lab work with plan to work on diet and exercise given prediabetes and plan to recheck in 4 months with a fasting check on CMP, lipid, and A1c prior to his appointment.      2. Essential hypertension  Assessment & Plan:  Hypertension is stable and controlled  Continue current treatment regimen.  Blood pressure will be reassessed in 6 months.    Orders:  -     carvedilol (COREG) 25 MG tablet; Take 1 tablet by mouth 2 (Two) Times a Day.  -     losartan (COZAAR) 100 MG tablet; Take 1 tablet by mouth Daily.    3. Hyperglycemia  Assessment & Plan:  Reviewed stable A1c with lab work and encouraged ongoing diet and exercise efforts to help prevent the progression of diabetes.    He is motivated to work on diet and exercise to help prevent diabetes progression      4. BPH associated with nocturia  Assessment & Plan:  Continues Flomax     Orders:  -     tamsulosin (FLOMAX) 0.4 MG capsule 24 hr capsule; Take 1 capsule by mouth every night at bedtime.    5.  Primary osteoarthritis of both knees  Assessment & Plan:  Continue with tylenol arthritis and voltaren gel prn      6. Hyperlipidemia LDL goal <100  Assessment & Plan:   Lipid abnormalities are improving with treatment    Plan:  Continue same medication/s without change.      Discussed medication dosage, use, side effects, and goals of treatment in detail.    Counseled patient on lifestyle modifications to help control hyperlipidemia.     Patient Treatment Goals:   LDL goal is under 100    Followup at the next regular appointment.    Orders:  -     rosuvastatin (CRESTOR) 10 MG tablet; Take 1 tablet by mouth Daily.  Dispense: 90 tablet; Refill: 1    7. Pulmonary hypertension  Assessment & Plan:  Continues on treatment from cardiology but declines KIRSTIN tx       8. KIRSTIN (obstructive sleep apnea)  Assessment & Plan:  Encouraged KIRSTIN tx - pt declines    Discussed CV risks with untreated KIRSTIN      9. Elevated PSA  Assessment & Plan:  Ongoing follow-up with urology.  Next appointment and follow-up PSA scheduled for June with Dr. Fried      10. Chronic diastolic (congestive) heart failure  Assessment & Plan:  Congestive heart failure due to hypertension.  Heart failure is improving with treatment.  NYHA Class I.  Continue current treatment regimen.  Heart failure will be reassessed in 6 months.    Orders:  -     aspirin 81 MG EC tablet; Take 1 tablet by mouth Daily.  -     carvedilol (COREG) 25 MG tablet; Take 1 tablet by mouth 2 (Two) Times a Day.  -     losartan (COZAAR) 100 MG tablet; Take 1 tablet by mouth Daily.  -     rosuvastatin (CRESTOR) 10 MG tablet; Take 1 tablet by mouth Daily.  Dispense: 90 tablet; Refill: 1    11. Class 1 obesity due to excess calories with serious comorbidity and body mass index (BMI) of 33.0 to 33.9 in adult  Assessment & Plan:  Patient's (Body mass index is 33.06 kg/m².) indicates that they are obese (BMI >30) with health conditions that include hypertension, dyslipidemias, and  osteoarthritis . Weight is improving with lifestyle modifications. BMI  is above average; BMI management plan is completed. We discussed portion control and increasing exercise.                Follow Up   Return in about 4 months (around 6/22/2024) for Med recheck.  Patient was given instructions and counseling regarding his condition or for health maintenance advice. Please see specific information pulled into the AVS if appropriate.

## 2024-02-22 NOTE — ASSESSMENT & PLAN NOTE
Lipid abnormalities are improving with treatment    Plan:  Continue same medication/s without change.      Discussed medication dosage, use, side effects, and goals of treatment in detail.    Counseled patient on lifestyle modifications to help control hyperlipidemia.     Patient Treatment Goals:   LDL goal is under 100    Followup at the next regular appointment.

## 2024-02-22 NOTE — ASSESSMENT & PLAN NOTE
Discussed together health maintenance and screening along with vaccination options and healthy diet and exercise habits as part of the preventative counseling at their physical exam today.     Encourage advance directive.    Reviewed together fasting lab work with plan to work on diet and exercise given prediabetes and plan to recheck in 4 months with a fasting check on CMP, lipid, and A1c prior to his appointment.

## 2024-02-22 NOTE — ASSESSMENT & PLAN NOTE
Patient's (Body mass index is 33.06 kg/m².) indicates that they are obese (BMI >30) with health conditions that include hypertension, dyslipidemias, and osteoarthritis . Weight is improving with lifestyle modifications. BMI  is above average; BMI management plan is completed. We discussed portion control and increasing exercise.

## 2024-06-21 ENCOUNTER — OFFICE VISIT (OUTPATIENT)
Dept: FAMILY MEDICINE CLINIC | Facility: CLINIC | Age: 79
End: 2024-06-21
Payer: MEDICARE

## 2024-06-21 VITALS
HEART RATE: 55 BPM | BODY MASS INDEX: 33.4 KG/M2 | DIASTOLIC BLOOD PRESSURE: 82 MMHG | SYSTOLIC BLOOD PRESSURE: 120 MMHG | OXYGEN SATURATION: 98 % | HEIGHT: 73 IN | WEIGHT: 252 LBS

## 2024-06-21 DIAGNOSIS — I27.20 PULMONARY HYPERTENSION: ICD-10-CM

## 2024-06-21 DIAGNOSIS — M25.562 CHRONIC PAIN OF LEFT KNEE: ICD-10-CM

## 2024-06-21 DIAGNOSIS — R73.9 HYPERGLYCEMIA: Primary | ICD-10-CM

## 2024-06-21 DIAGNOSIS — M17.0 PRIMARY OSTEOARTHRITIS OF BOTH KNEES: ICD-10-CM

## 2024-06-21 DIAGNOSIS — I50.32 CHRONIC DIASTOLIC (CONGESTIVE) HEART FAILURE: ICD-10-CM

## 2024-06-21 DIAGNOSIS — E78.5 HYPERLIPIDEMIA LDL GOAL <100: ICD-10-CM

## 2024-06-21 DIAGNOSIS — E66.09 CLASS 1 OBESITY DUE TO EXCESS CALORIES WITH SERIOUS COMORBIDITY AND BODY MASS INDEX (BMI) OF 33.0 TO 33.9 IN ADULT: ICD-10-CM

## 2024-06-21 DIAGNOSIS — G89.29 CHRONIC PAIN OF LEFT KNEE: ICD-10-CM

## 2024-06-21 DIAGNOSIS — I10 ESSENTIAL HYPERTENSION: ICD-10-CM

## 2024-06-21 DIAGNOSIS — G47.33 OSA (OBSTRUCTIVE SLEEP APNEA): ICD-10-CM

## 2024-06-21 DIAGNOSIS — R35.1 BPH ASSOCIATED WITH NOCTURIA: ICD-10-CM

## 2024-06-21 DIAGNOSIS — N40.1 BPH ASSOCIATED WITH NOCTURIA: ICD-10-CM

## 2024-06-21 DIAGNOSIS — R97.20 ELEVATED PSA: ICD-10-CM

## 2024-06-21 LAB
EXPIRATION DATE: NORMAL
HBA1C MFR BLD: 5.7 % (ref 4.5–5.7)
Lab: NORMAL

## 2024-06-21 PROCEDURE — 1159F MED LIST DOCD IN RCRD: CPT | Performed by: FAMILY MEDICINE

## 2024-06-21 PROCEDURE — 3074F SYST BP LT 130 MM HG: CPT | Performed by: FAMILY MEDICINE

## 2024-06-21 PROCEDURE — 1160F RVW MEDS BY RX/DR IN RCRD: CPT | Performed by: FAMILY MEDICINE

## 2024-06-21 PROCEDURE — 3044F HG A1C LEVEL LT 7.0%: CPT | Performed by: FAMILY MEDICINE

## 2024-06-21 PROCEDURE — 83036 HEMOGLOBIN GLYCOSYLATED A1C: CPT | Performed by: FAMILY MEDICINE

## 2024-06-21 PROCEDURE — 3079F DIAST BP 80-89 MM HG: CPT | Performed by: FAMILY MEDICINE

## 2024-06-21 PROCEDURE — 99214 OFFICE O/P EST MOD 30 MIN: CPT | Performed by: FAMILY MEDICINE

## 2024-06-21 PROCEDURE — 1125F AMNT PAIN NOTED PAIN PRSNT: CPT | Performed by: FAMILY MEDICINE

## 2024-06-21 RX ORDER — TAMSULOSIN HYDROCHLORIDE 0.4 MG/1
1 CAPSULE ORAL DAILY
Qty: 90 CAPSULE | Refills: 2 | Status: SHIPPED | OUTPATIENT
Start: 2024-06-21

## 2024-06-21 RX ORDER — ROSUVASTATIN CALCIUM 10 MG/1
10 TABLET, COATED ORAL DAILY
Qty: 90 TABLET | Refills: 2 | Status: SHIPPED | OUTPATIENT
Start: 2024-06-21

## 2024-06-21 NOTE — ASSESSMENT & PLAN NOTE
Patient's (Body mass index is 33.25 kg/m².) indicates that they are obese (BMI >30) with health conditions that include hypertension, dyslipidemias, and osteoarthritis . Weight is improving with lifestyle modifications. BMI  is above average; BMI management plan is completed. We discussed portion control and increasing exercise.

## 2024-06-21 NOTE — ASSESSMENT & PLAN NOTE
Ongoing follow-up with urology.  Next appointment and follow-up PSA scheduled for next week with Dr. Fried

## 2024-06-21 NOTE — ASSESSMENT & PLAN NOTE
Continue with tylenol arthritis and voltaren gel prn    Worsening L knee OA.  Scheduling followup with Ortho

## 2024-06-21 NOTE — PROGRESS NOTES
"Chief Complaint  Hyperglycemia/prediabetes, HTN, Hyperlipidemia, BPH/Elevated PSA    Subjective    History of Present Illness:  Werner Brower is a 78 y.o. male who presents today for followup visit and medication refills     Doing well after our visit in Feb.    A1c up to 6.4 in January compared to 6.3 in May last year - has been working harder with diet/exercise efforts. A1c down to 5.7 today!!!      He did have an episode of volume overload with heart failure that resulted from his volume overload but his ejection fraction returned to normal after a solution of his volume overload episode.  His BNP returned normal after resolution of his acute volume overload.  He is following with Dr. Schmitt regarding his chronic diastolic heart failure.    KIRSTIN workup did show sleep apnea  - discussed again today - he declines treatment and does understand this can contribute to worsening pulm HTN and diastolic CHF.      He continues on carvedilol, losartan, low-dose aspirin, Crestor, and was taken off Vascepa by Cardiology.      He is following with urology given past PSA elevation at 4.7.  He is now on Flomax for BPH symptoms and he does continues with PSA monitoring with Urology (Dr Fried).  Apt next week with PSA planned with urology        Objective   Vital Signs:   /82 (BP Location: Left arm, Patient Position: Sitting, Cuff Size: Large Adult)   Pulse 55   Ht 185.4 cm (73\")   Wt 114 kg (252 lb)   SpO2 98%   BMI 33.25 kg/m²     Review of Systems   Constitutional:  Negative for appetite change, chills and fever.   HENT:  Negative for hearing loss.    Eyes:  Negative for blurred vision.   Respiratory:  Negative for chest tightness.    Cardiovascular:  Negative for chest pain.   Gastrointestinal:  Negative for abdominal pain.   Musculoskeletal:  Positive for arthralgias. Negative for gait problem.   Skin:  Negative for rash.   Psychiatric/Behavioral:  Negative for depressed mood.        Past History:  Medical " History: has a past medical history of Allergic (several years ago), Arrhythmia, Arthritis, Back pain, BPH associated with nocturia, Cervical disc disorder, Colon polyp (several years ago), Condition not found, Erectile dysfunction (3 years ago), Essential hypertension, Gallbladder problem, Gonarthrosis, H/O colonoscopy, Heart murmur (many years ago), High risk medication use, History of circumcision, HL (hearing loss) (many years ago), Hyperlipidemia (many years ago), Knee sprain, Migraine headache, Peptic ulceration, RLS (restless legs syndrome), Rotator cuff syndrome, Skin problem, Urinary tract infection (several times 4 years ago), and Visual impairment (many years ago).   Surgical History: has a past surgical history that includes Cholecystectomy; Colonoscopy (2010); Elbow arthroscopy (Left); Circumcision, non-; Replacement total knee (Right); Joint replacement (right knee ); Elbow surgery; and Blepharoplasty.   Family History: family history includes Hearing loss in his father and mother.   Social History: reports that he quit smoking about 49 years ago. His smoking use included cigars. He has never used smokeless tobacco. He reports that he does not currently use alcohol. He reports that he does not use drugs.      Current Outpatient Medications:     aspirin 81 MG EC tablet, Take 1 tablet by mouth Daily., Disp: , Rfl:     carboxymethylcellulose (REFRESH PLUS) 0.5 % solution, Daily As Needed for Dry Eyes., Disp: , Rfl:     carvedilol (COREG) 25 MG tablet, Take 1 tablet by mouth 2 (Two) Times a Day., Disp: , Rfl:     Diclofenac Sodium (VOLTAREN) 1 % gel gel, Apply 4 g topically to the appropriate area as directed 4 (Four) Times a Day As Needed., Disp: , Rfl:     losartan (COZAAR) 100 MG tablet, Take 1 tablet by mouth Daily., Disp: , Rfl:     multivitamins-minerals (PRESERVISION AREDS 2) capsule capsule, Daily., Disp: , Rfl:     rosuvastatin (CRESTOR) 10 MG tablet, Take 1 tablet by mouth  Daily., Disp: 90 tablet, Rfl: 2    tamsulosin (FLOMAX) 0.4 MG capsule 24 hr capsule, Take 1 capsule by mouth Daily., Disp: 90 capsule, Rfl: 2    vitamin C (ASCORBIC ACID) 250 MG tablet, Take 2 tablets by mouth 2 (Two) Times a Day., Disp: , Rfl:     Allergies: Ace inhibitors, Cefuroxime, and Nitrofurantoin    Physical Exam  Constitutional:       Appearance: He is obese.   HENT:      Head: Normocephalic.      Right Ear: External ear normal.      Left Ear: External ear normal.      Nose: Nose normal.   Eyes:      Pupils: Pupils are equal, round, and reactive to light.   Cardiovascular:      Rate and Rhythm: Normal rate and regular rhythm.      Heart sounds: Normal heart sounds.   Pulmonary:      Effort: Pulmonary effort is normal.      Breath sounds: Normal breath sounds.   Musculoskeletal:      Cervical back: Normal range of motion.      Comments: Worsening L knee pain and crepitus c/w advanced OA   Skin:     General: Skin is warm and dry.   Neurological:      General: No focal deficit present.      Mental Status: He is alert.   Psychiatric:         Mood and Affect: Mood normal.         Behavior: Behavior normal.         Thought Content: Thought content normal.          Result Review                   Assessment and Plan  Diagnoses and all orders for this visit:    1. Hyperglycemia (Primary)  Assessment & Plan:  Doing well with diet/exercise    A1c down to 5.7!    Cont diet/exercise efforts.     Orders:  -     POCT glycated hemoglobin, total  -     Hemoglobin A1c; Future    2. Essential hypertension  Assessment & Plan:  Hypertension is stable and controlled  Continue current treatment regimen.  Weight loss.  Regular aerobic exercise.  Blood pressure will be reassessed  4 mos .    Orders:  -     CBC Auto Differential; Future  -     Comprehensive Metabolic Panel; Future  -     Lipid Panel; Future  -     TSH; Future  -     T4, Free; Future    3. BPH associated with nocturia  Assessment & Plan:  Continues Flomax      Orders:  -     tamsulosin (FLOMAX) 0.4 MG capsule 24 hr capsule; Take 1 capsule by mouth Daily.  Dispense: 90 capsule; Refill: 2    4. Primary osteoarthritis of both knees  Assessment & Plan:  Continue with tylenol arthritis and voltaren gel prn    Worsening L knee OA.  Scheduling followup with Ortho      5. Hyperlipidemia LDL goal <100  Assessment & Plan:   Lipid abnormalities are improving with treatment    Plan:  Continue same medication/s without change.      Discussed medication dosage, use, side effects, and goals of treatment in detail.    Counseled patient on lifestyle modifications to help control hyperlipidemia.     Patient Treatment Goals:   LDL goal is under 100    Followup at the next regular appointment.    Orders:  -     rosuvastatin (CRESTOR) 10 MG tablet; Take 1 tablet by mouth Daily.  Dispense: 90 tablet; Refill: 2  -     CBC Auto Differential; Future  -     Comprehensive Metabolic Panel; Future  -     Lipid Panel; Future  -     TSH; Future  -     T4, Free; Future    6. Pulmonary hypertension  Assessment & Plan:  Continues on treatment from cardiology but declines KIRSTIN tx       7. KIRSTIN (obstructive sleep apnea)  Assessment & Plan:  Encouraged KIRSTIN tx - pt declines    Discussed CV risks with untreated KIRSTIN      8. Elevated PSA  Assessment & Plan:  Ongoing follow-up with urology.  Next appointment and follow-up PSA scheduled for next week with Dr. Fried      9. Chronic diastolic (congestive) heart failure  Assessment & Plan:  Congestive heart failure due to hypertension.  Heart failure is improving with treatment.  NYHA Class I.  Continue current treatment regimen.  Heart failure will be reassessed in 6 months.    Orders:  -     rosuvastatin (CRESTOR) 10 MG tablet; Take 1 tablet by mouth Daily.  Dispense: 90 tablet; Refill: 2    10. Chronic pain of left knee  -     Ambulatory Referral to Orthopedic Surgery    11. Class 1 obesity due to excess calories with serious comorbidity and body mass index  (BMI) of 33.0 to 33.9 in adult  Assessment & Plan:  Patient's (Body mass index is 33.25 kg/m².) indicates that they are obese (BMI >30) with health conditions that include hypertension, dyslipidemias, and osteoarthritis . Weight is improving with lifestyle modifications. BMI  is above average; BMI management plan is completed. We discussed portion control and increasing exercise.                      Follow Up  Return in about 4 months (around 10/21/2024) for Med recheck.    Mathew Olivarez MD

## 2024-06-21 NOTE — ASSESSMENT & PLAN NOTE
Hypertension is stable and controlled  Continue current treatment regimen.  Weight loss.  Regular aerobic exercise.  Blood pressure will be reassessed  4 mos .

## 2024-07-22 ENCOUNTER — TELEPHONE (OUTPATIENT)
Dept: ORTHOPEDIC SURGERY | Facility: CLINIC | Age: 79
End: 2024-07-22
Payer: MEDICARE

## 2024-08-27 ENCOUNTER — OFFICE VISIT (OUTPATIENT)
Dept: ORTHOPEDIC SURGERY | Facility: CLINIC | Age: 79
End: 2024-08-27
Payer: MEDICARE

## 2024-08-27 VITALS
WEIGHT: 247.6 LBS | SYSTOLIC BLOOD PRESSURE: 128 MMHG | DIASTOLIC BLOOD PRESSURE: 62 MMHG | HEIGHT: 72 IN | BODY MASS INDEX: 33.54 KG/M2

## 2024-08-27 DIAGNOSIS — M25.562 LEFT KNEE PAIN, UNSPECIFIED CHRONICITY: Primary | ICD-10-CM

## 2024-08-27 DIAGNOSIS — M17.12 PRIMARY OSTEOARTHRITIS OF LEFT KNEE: ICD-10-CM

## 2024-08-27 PROCEDURE — 3078F DIAST BP <80 MM HG: CPT | Performed by: STUDENT IN AN ORGANIZED HEALTH CARE EDUCATION/TRAINING PROGRAM

## 2024-08-27 PROCEDURE — 1160F RVW MEDS BY RX/DR IN RCRD: CPT | Performed by: STUDENT IN AN ORGANIZED HEALTH CARE EDUCATION/TRAINING PROGRAM

## 2024-08-27 PROCEDURE — 3074F SYST BP LT 130 MM HG: CPT | Performed by: STUDENT IN AN ORGANIZED HEALTH CARE EDUCATION/TRAINING PROGRAM

## 2024-08-27 PROCEDURE — 99213 OFFICE O/P EST LOW 20 MIN: CPT | Performed by: STUDENT IN AN ORGANIZED HEALTH CARE EDUCATION/TRAINING PROGRAM

## 2024-08-27 PROCEDURE — 1159F MED LIST DOCD IN RCRD: CPT | Performed by: STUDENT IN AN ORGANIZED HEALTH CARE EDUCATION/TRAINING PROGRAM

## 2024-08-27 NOTE — PROGRESS NOTES
[facility] Office Visit     Office Visit       Date: 08/27/2024   Patient Name: Werner Brower  MRN: [id number]  YOB: 1945    Referring Physician: Mathew Olivarez,*     Chief Complaint:   Chief Complaint   Patient presents with    Left Knee - Pain       History of Present Illness:   Werner Brower is a 79 y.o. male who presents with left knee pain for 7 year(s). Onset atraumatic and gradual in nature. Pain is localized to the medial joint line and is a 9/10 on the pain scale. Pain is described as stabbing. Associated symptoms include pain, popping, grinding, and giving way/buckling. The pain is worse with walking, standing, sitting, climbing stairs, sleeping, and working; lying down make it better. Previous treatments have included: bracing and NSAIDS since symptom onset. Although some transient relief was reported with these interventions, these conservative measures have failed and symptoms have persisted. The patient is limited in daily activities and has had a significant decrease in quality of life as a result. He denies fevers, chills, or constitutional symptoms.    Subjective   Review of Systems: Review of Systems   Constitutional:  Negative for chills, fever, unexpected weight gain and unexpected weight loss.   HENT:  Negative for congestion, postnasal drip and rhinorrhea.    Eyes:  Negative for blurred vision.   Respiratory:  Negative for shortness of breath.    Cardiovascular:  Negative for leg swelling.   Gastrointestinal:  Negative for abdominal pain, nausea and vomiting.   Genitourinary:  Negative for difficulty urinating.   Musculoskeletal:  Positive for arthralgias. Negative for gait problem, joint swelling and myalgias.   Skin:  Negative for skin lesions and wound.   Neurological:  Negative for dizziness, weakness, light-headedness and numbness.   Hematological:  Does not bruise/bleed easily.   Psychiatric/Behavioral:   Negative for depressed mood.         I have reviewed the following portions of the patient's history:History of Present Illness and review of systems.    Past Medical History:   Past Medical History:   Diagnosis Date    Allergic several years ago    Arrhythmia     Arthritis     Back pain     BPH associated with nocturia     Cervical disc disorder     Colon polyp several years ago    Condition not found     BROKEN ELBOW    Erectile dysfunction 3 years ago    Essential hypertension     Gallbladder problem     Gonarthrosis     BILATERAL    H/O colonoscopy     Heart murmur many years ago    High risk medication use     DRUG THERAPY FINDING    History of circumcision     HL (hearing loss) many years ago    Hyperlipidemia many years ago    Knee sprain     Migraine headache     Peptic ulceration     RLS (restless legs syndrome)     Rotator cuff syndrome     Skin problem     Urinary tract infection several times 4 years ago    Visual impairment many years ago       Past Surgical History:   Past Surgical History:   Procedure Laterality Date    BLEPHAROPLASTY      CHOLECYSTECTOMY      CIRCUMCISION      COLONOSCOPY  2010    SENSILE POLYPS 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, EXT HEMMORHOIDS REPEAT 3 YEARS ([facility])    ELBOW ARTHROSCOPY Left     ELBOW PROCEDURE      JOINT REPLACEMENT  right knee 2017    REPLACEMENT TOTAL KNEE Right        Family History:   Family History   Problem Relation Age of Onset    Hearing loss Mother     Hearing loss Father        Social History:   Social History     Socioeconomic History    Marital status:    Tobacco Use    Smoking status: Former     Current packs/day: 0.00     Types: Cigars, Cigarettes     Quit date: 1975     Years since quittin.6    Smokeless tobacco: Never   Vaping Use    Vaping status: Never Used   Substance and Sexual Activity    Alcohol use: Not Currently    Drug use: Never    Sexual activity: Not Currently     Partners: Female       Medications:   Current  "Outpatient Medications:     aspirin 81 MG EC tablet, Take 1 tablet by mouth Daily., Disp: , Rfl:     carboxymethylcellulose (REFRESH PLUS) 0.5 % solution, Daily As Needed for Dry Eyes., Disp: , Rfl:     carvedilol (COREG) 25 MG tablet, Take 1 tablet by mouth 2 (Two) Times a Day., Disp: , Rfl:     Diclofenac Sodium (VOLTAREN) 1 % gel gel, Apply 4 g topically to the appropriate area as directed 4 (Four) Times a Day As Needed., Disp: , Rfl:     losartan (COZAAR) 100 MG tablet, Take 1 tablet by mouth Daily., Disp: , Rfl:     multivitamins-minerals (PRESERVISION AREDS 2) capsule capsule, Daily., Disp: , Rfl:     rosuvastatin (CRESTOR) 10 MG tablet, Take 1 tablet by mouth Daily., Disp: 90 tablet, Rfl: 2    tamsulosin (FLOMAX) 0.4 MG capsule 24 hr capsule, Take 1 capsule by mouth Daily., Disp: 90 capsule, Rfl: 2    vitamin C (ASCORBIC ACID) 250 MG tablet, Take 2 tablets by mouth 2 (Two) Times a Day., Disp: , Rfl:     Allergies:   Allergies   Allergen Reactions    Ace Inhibitors Unknown - High Severity     Other reaction(s): cough    Cefuroxime GI Intolerance    Nitrofurantoin Unknown - High Severity       I reviewed the patient's chief complaint, history of present illness, review of systems, past medical history, surgical history, family history, social history, medications and allergy list.     Objective    Vital Signs:   Vitals:    08/27/24 1048   BP: 128/62   Weight: 112 kg (247 lb 9.6 oz)   Height: 182.9 cm (72\")     Body mass index is 33.58 kg/m².   BMI is >= 30 and <35. (Class 1 Obesity). The following options were offered after discussion;: exercise counseling/recommendations and nutrition counseling/recommendations     Patient reports that he is a former smoker. He quit smoking and has not resumed smoking since that time.  This behavior was applauded and she was encouraged to continue in smoking cessation.  We will continue to monitor at subsequent visits.      Ortho Exam:  Left knee: No erythema, ecchymosis, " swelling.  Tender to palpation over the medial and lateral joint line.  Full range of motion in flexion extension but pain is experienced with deep knee flexion.  She can get to 0 degrees extension, 130 degrees in flexion.  Stable to varus and valgus stress.  Negative anterior posterior drawer.  Negative Ahmet's.  Sensation intact to light touch.  5/5 strength.  2+ posterior tibial pulse.    Results Review:   Imaging Results (Last 24 Hours)       Procedure Component Value Units Date/Time    XR Knee 4+ View Left [[id number]] Resulted: 08/27/24 1038     Updated: 08/27/24 1046        I personally reviewed and interpreted the radiographs of the left knee from 8/27/2024.  No acute fracture or dislocation.  Advanced tricompartmental degenerative changes are noted in both knees with subchondral sclerosis and osteophyte formation.  Arthritic changes are unchanged compared to similar images from 2023.    Procedures    Assessment / Plan    Assessment/Plan:   Diagnoses and all orders for this visit:    1. Left knee pain, unspecified chronicity (Primary)  -     XR Knee 4+ View Left    2. Primary osteoarthritis of left knee    Follow-up 1 persistent left knee pain.  Continues to note significant joint line tenderness in the medial and lateral joint spaces.  We performed viscosupplementation injections 1 year ago that did not provide any significant relief.  Prior to me, he had multiple corticosteroid injections with declining length of efficacy.  He is not interested in restarting that series.  Takes Tylenol but is not interested in another medications to control pain.  Today, he would like to discuss his surgical options and states that he is ready for joint replacement.  I will have him follow-up with Dr. Gill to discuss his candidacy for joint replacement.  He will follow-up with me as needed.    Follow Up:   Return for F/U with Jairo.      Austin Bales MD  [facility] [place] [facility]

## 2024-08-29 ENCOUNTER — OFFICE VISIT (OUTPATIENT)
Dept: ORTHOPEDIC SURGERY | Facility: CLINIC | Age: 79
End: 2024-08-29
Payer: MEDICARE

## 2024-08-29 VITALS
DIASTOLIC BLOOD PRESSURE: 72 MMHG | SYSTOLIC BLOOD PRESSURE: 130 MMHG | BODY MASS INDEX: 33.44 KG/M2 | HEIGHT: 72 IN | WEIGHT: 246.91 LBS

## 2024-08-29 DIAGNOSIS — M17.12 PRIMARY OSTEOARTHRITIS OF LEFT KNEE: Primary | ICD-10-CM

## 2024-08-29 DIAGNOSIS — M25.562 LEFT KNEE PAIN, UNSPECIFIED CHRONICITY: ICD-10-CM

## 2024-08-29 PROBLEM — M17.10 ARTHRITIS OF KNEE: Status: ACTIVE | Noted: 2024-08-29

## 2024-08-29 PROCEDURE — 1159F MED LIST DOCD IN RCRD: CPT | Performed by: ORTHOPAEDIC SURGERY

## 2024-08-29 PROCEDURE — 1160F RVW MEDS BY RX/DR IN RCRD: CPT | Performed by: ORTHOPAEDIC SURGERY

## 2024-08-29 PROCEDURE — 3078F DIAST BP <80 MM HG: CPT | Performed by: ORTHOPAEDIC SURGERY

## 2024-08-29 PROCEDURE — 99214 OFFICE O/P EST MOD 30 MIN: CPT | Performed by: ORTHOPAEDIC SURGERY

## 2024-08-29 PROCEDURE — 3075F SYST BP GE 130 - 139MM HG: CPT | Performed by: ORTHOPAEDIC SURGERY

## 2024-08-29 RX ORDER — MELOXICAM 7.5 MG/1
15 TABLET ORAL ONCE
OUTPATIENT
Start: 2024-08-29 | End: 2024-08-29

## 2024-08-29 RX ORDER — CHLORHEXIDINE GLUCONATE 40 MG/ML
SOLUTION TOPICAL
Qty: 236 ML | Refills: 0 | Status: SHIPPED | OUTPATIENT
Start: 2024-08-29

## 2024-08-29 RX ORDER — PREGABALIN 75 MG/1
75 CAPSULE ORAL ONCE
OUTPATIENT
Start: 2024-08-29 | End: 2024-08-29

## 2024-08-29 RX ORDER — ACETAMINOPHEN 325 MG/1
1000 TABLET ORAL ONCE
OUTPATIENT
Start: 2024-08-29 | End: 2024-08-29

## 2024-08-29 NOTE — PROGRESS NOTES
Orthopaedic Clinic Note: Knee Established Patient    Chief Complaint   Patient presents with    Left Knee - Pain, Initial Evaluation        HPI    It has been 2  day(s) since Mr. Brower's last visit. He returns to clinic today for discussion of total knee arthroplasty.  He has previously been seen by Dr. Bales and has undergone multiple conservative interventions including cortisone injections and viscosupplementation injections.  Despite these interventions, he continues to have debilitating pain in the left knee that he rates a 10/10 on the pain scale.  He is ambulating with no assistive device.  Denies fevers chills or constitutional symptoms.  He is here to discuss total knee arthroplasty.    Past Medical History:   Diagnosis Date    Allergic several years ago    Arrhythmia     Arthritis     Back pain     BPH associated with nocturia     Cervical disc disorder     Colon polyp several years ago    Condition not found     BROKEN ELBOW    Erectile dysfunction 3 years ago    Essential hypertension     Gallbladder problem     Gonarthrosis     BILATERAL    H/O colonoscopy     Heart murmur many years ago    High risk medication use     DRUG THERAPY FINDING    History of circumcision     HL (hearing loss) many years ago    Hyperlipidemia many years ago    Knee sprain     Migraine headache     Peptic ulceration     RLS (restless legs syndrome)     Rotator cuff syndrome     Skin problem     Urinary tract infection several times 4 years ago    Visual impairment many years ago      Past Surgical History:   Procedure Laterality Date    BLEPHAROPLASTY      CHOLECYSTECTOMY      CIRCUMCISION      COLONOSCOPY  02/24/2010    SENSILE POLYPS 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, EXT HEMMORHOIDS REPEAT 3 YEARS (SANTIAGO)    ELBOW ARTHROSCOPY Left     ELBOW PROCEDURE      JOINT REPLACEMENT  right knee 2017    REPLACEMENT TOTAL KNEE Right       Family History   Problem Relation Age of Onset    Hearing loss Mother     Hearing loss Father   "    Social History     Socioeconomic History    Marital status:    Tobacco Use    Smoking status: Former     Current packs/day: 0.00     Types: Cigarettes, Cigars     Quit date: 1975     Years since quittin.6    Smokeless tobacco: Never   Vaping Use    Vaping status: Never Used   Substance and Sexual Activity    Alcohol use: Not Currently    Drug use: Never    Sexual activity: Not Currently     Partners: Female      Current Outpatient Medications on File Prior to Visit   Medication Sig Dispense Refill    aspirin 81 MG EC tablet Take 1 tablet by mouth Daily.      carboxymethylcellulose (REFRESH PLUS) 0.5 % solution Daily As Needed for Dry Eyes.      carvedilol (COREG) 25 MG tablet Take 1 tablet by mouth 2 (Two) Times a Day.      Diclofenac Sodium (VOLTAREN) 1 % gel gel Apply 4 g topically to the appropriate area as directed 4 (Four) Times a Day As Needed.      losartan (COZAAR) 100 MG tablet Take 1 tablet by mouth Daily.      multivitamins-minerals (PRESERVISION AREDS 2) capsule capsule Daily.      rosuvastatin (CRESTOR) 10 MG tablet Take 1 tablet by mouth Daily. 90 tablet 2    tamsulosin (FLOMAX) 0.4 MG capsule 24 hr capsule Take 1 capsule by mouth Daily. 90 capsule 2    vitamin C (ASCORBIC ACID) 250 MG tablet Take 2 tablets by mouth 2 (Two) Times a Day.       No current facility-administered medications on file prior to visit.      Allergies   Allergen Reactions    Ace Inhibitors Unknown - High Severity     Other reaction(s): cough    Cefuroxime GI Intolerance    Nitrofurantoin Unknown - High Severity        Review of Systems     The patient's Review of Systems was personally reviewed and confirmed as accurate.    Physical Exam  Blood pressure 130/72, height 182.9 cm (72.01\"), weight 112 kg (246 lb 14.6 oz).    Body mass index is 33.48 kg/m².    GENERAL APPEARANCE: awake, alert, oriented, in no acute distress and well developed, well nourished  LUNGS:  breathing nonlabored  EXTREMITIES: no clubbing, " cyanosis  PERIPHERAL PULSES: palpable dorsalis pedis and posterior tibial pulses bilaterally.    GAIT:  Antalgic        ----------  Left Knee Exam:  ----------  ALIGNMENT: severe valgus, correctable to neutral  ----------  RANGE OF MOTION:  Decreased (3 - 120 degrees) with no extensor lag  LIGAMENTOUS STABILITY:   stable to varus and valgus stress at terminal extension and 30 degrees; retensioning of the LCL is appreciated with varus stress at 30 degrees consistent with lateral compartment degeneration  ----------  STRENGTH:  KNEE FLEXION 4/5  KNEE EXTENSION  4/5  ANKLE DORSIFLEXION  5/5  ANKLE PLANTARFLEXION  5/5  ----------  PAIN WITH PALPATION:global  KNEE EFFUSION: yes, mild effusion  PAIN WITH KNEE ROM: yes  PATELLAR CREPITUS:  yes, painful and symptomatic  ----------  SENSATION TO LIGHT TOUCH:  DEEP PERONEAL/SUPERFICIAL PERONEAL/SURAL/SAPHENOUS/TIBIAL:    intact  ----------  EDEMA:  no  ERYTHEMA:    no  WOUNDS/INCISIONS:   no  _____________________________________________________________________  _____________________________________________________________________    RADIOGRAPHIC FINDINGS:   Indication: Left knee pain    Comparison: Todays xrays were compared to previous xrays from 8/27/2024    Knee films: moderate to severe tricompartmental arthritis with genu valgum alignment, periarticular osteophytes visualized in all compartments and No significant changes compared to prior radiographs.    Assessment/Plan:   Diagnosis Plan   1. Primary osteoarthritis of left knee  Case Request    CBC and Differential    Comprehensive metabolic panel    Protime-INR    APTT    Hemoglobin A1c    ECG 12 Lead    Nicotine & Metabolite, Quant    Tranexamic Acid 1,000 mg in sodium chloride 0.9 % 100 mL    Tranexamic Acid 1,000 mg in sodium chloride 0.9 % 100 mL    ethyl alcohol 62 % 2 each    ceFAZolin (ANCEF) 2 g in sodium chloride 0.9 % 100 mL IVPB    acetaminophen (TYLENOL) tablet 975 mg    meloxicam (MOBIC) tablet 15 mg     pregabalin (LYRICA) capsule 75 mg    Case Request    CT Lower Extremity Left Without Contrast      2. Left knee pain, unspecified chronicity  XR Knee 4+ View Left        The patient has clinical and radiographic evidence of end-stage left knee joint degeneration. Conservative measures have been tried for 3 months or longer, but have failed to adequately treat or improve the patient's symptoms. Pain is restricting the patient's daily activities as well as quality of life. The recommendation at this time is to proceed with a left total knee arthroplasty with the goal to improve patient function and pain. The risks, benefits, potential complications, and alternatives were discussed with the patient in detail. Risks included but were not limited to bleeding, infection, anesthesia risks, damage to neurovascular structures, osteolysis, aseptic loosening, instability, dislocation, pain, continued pain, iatrogenic fracture, possible peroneal nerve palsy from fracture valgus deformity and flexion contracture, possible need for future surgery including the potential for amputation, blood clots, myocardial infarction, stroke, and death. [place]-operative blood management and the potential for blood transfusion were discussed with risks and options clearly outlined. Specific details of the surgical procedure, hospitalization, recovery, rehabilitation, and long-term precautions were also presented. Pre-operative teaching was provided. Implant/prosthesis selection was outlined, and the many options available were explained; the final choice will be made at the time of the procedure to match the anatomy and condition of the bone, ligaments, tendons, and muscles. Given this instruction, the patient elected to proceed with the left total knee arthroplasty. The patient will be seen by pre-admission testing for pre-operative optimization and risk assessment and will be scheduled for surgery once this is completed.    The patient is  considered standard risk for DVT based on patient risk factors and will be placed on aspirin postoperatively for DVT prophylaxis.        Mathew Gill MD  08/29/24  10:05 EDT

## 2024-09-05 ENCOUNTER — TELEPHONE (OUTPATIENT)
Dept: CARDIOLOGY | Facility: CLINIC | Age: 79
End: 2024-09-05
Payer: MEDICARE

## 2024-09-05 DIAGNOSIS — I50.32 CHRONIC DIASTOLIC (CONGESTIVE) HEART FAILURE: ICD-10-CM

## 2024-09-05 DIAGNOSIS — I71.40 ABDOMINAL AORTIC ANEURYSM (AAA) WITHOUT RUPTURE, UNSPECIFIED PART: Primary | ICD-10-CM

## 2024-09-05 NOTE — LETTER
09/06/24        Fax # - [phone]    Re: Werner Brower, 1945   Procedure/Surgery - total knee arthroplasty        Dear Dr. Gill,     Werner Brower, 1945 is LOW RISK  for scheduled procedure/surgery from a cardiac/EP standpoint.  Any questions please call [phone].    [x]  Continue Aspirin            Sincerely,          MD Nereyda Ahuja RN

## 2024-09-05 NOTE — TELEPHONE ENCOUNTER
Dr. Gill requesting cardiac risk assessment for total knee arthroplasty on 10/9.     Last seen 11/2023- Chronic diastolic heart failure Echo from [facility] 11/2022 showed EF greater than 55% with grade 2 diastolic dysfunction, mild LVH. Stress test from 2021 showed small reversible inferior defect noted by Dr. Duncan, no further testing was recommended at that time. Ascending aortic aneurysm 4 cm 11/2022 on echo at [facility], I do not see any repeat recent echo since then. Moderate pulm HTN RVSP 55 to 61 mmHg.       I spoke w/pt today and he denies all cardiac symptoms at this time, mentions BP has been normal as well.     If okay to proceed will fax CC letter to F: [phone]

## 2024-09-10 DIAGNOSIS — M17.12 PRIMARY OSTEOARTHRITIS OF LEFT KNEE: Primary | ICD-10-CM

## 2024-09-25 ENCOUNTER — LAB (OUTPATIENT)
Dept: LAB | Facility: HOSPITAL | Age: 79
End: 2024-09-25
Payer: MEDICARE

## 2024-09-25 ENCOUNTER — HOSPITAL ENCOUNTER (OUTPATIENT)
Dept: CT IMAGING | Facility: HOSPITAL | Age: 79
Discharge: HOME | End: 2024-09-25
Payer: MEDICARE

## 2024-09-25 ENCOUNTER — PRE-ADMISSION TESTING (OUTPATIENT)
Dept: PREADMISSION TESTING | Facility: HOSPITAL | Age: 79
End: 2024-09-25
Payer: MEDICARE

## 2024-09-25 VITALS — HEIGHT: 72 IN | WEIGHT: 254.96 LBS | BODY MASS INDEX: 34.53 KG/M2

## 2024-09-25 DIAGNOSIS — M17.12 PRIMARY OSTEOARTHRITIS OF LEFT KNEE: ICD-10-CM

## 2024-09-25 LAB
ALBUMIN SERPL-MCNC: 3.9 G/DL (ref 3.5–5.2)
ALBUMIN/GLOB SERPL: 1 G/DL
ALP SERPL-CCNC: 68 U/L (ref 39–117)
ALT SERPL W P-5'-P-CCNC: 17 U/L (ref 1–41)
ANION GAP SERPL CALCULATED.3IONS-SCNC: 11 MMOL/L (ref 5–15)
APTT PPP: 26.4 SECONDS (ref 22–39)
AST SERPL-CCNC: 18 U/L (ref 1–40)
BASOPHILS # BLD AUTO: 0.05 10*3/MM3 (ref 0–0.2)
BASOPHILS NFR BLD AUTO: 0.6 % (ref 0–1.5)
BILIRUB SERPL-MCNC: 0.4 MG/DL (ref 0–1.2)
BUN SERPL-MCNC: 16 MG/DL (ref 8–23)
BUN/CREAT SERPL: 15 (ref 7–25)
CALCIUM SPEC-SCNC: 9.3 MG/DL (ref 8.6–10.5)
CHLORIDE SERPL-SCNC: 102 MMOL/L (ref 98–107)
CO2 SERPL-SCNC: 26 MMOL/L (ref 22–29)
CREAT SERPL-MCNC: 1.07 MG/DL (ref 0.76–1.27)
DEPRECATED RDW RBC AUTO: 45.1 FL (ref 37–54)
EGFRCR SERPLBLD CKD-EPI 2021: 71 ML/MIN/1.73
EOSINOPHIL # BLD AUTO: 0.54 10*3/MM3 (ref 0–0.4)
EOSINOPHIL NFR BLD AUTO: 6.7 % (ref 0.3–6.2)
ERYTHROCYTE [DISTWIDTH] IN BLOOD BY AUTOMATED COUNT: 12.8 % (ref 12.3–15.4)
GLOBULIN UR ELPH-MCNC: 3.9 GM/DL
GLUCOSE SERPL-MCNC: 135 MG/DL (ref 65–99)
HBA1C MFR BLD: 5.9 % (ref 4.8–5.6)
HCT VFR BLD AUTO: 36.2 % (ref 37.5–51)
HGB BLD-MCNC: 12.3 G/DL (ref 13–17.7)
IMM GRANULOCYTES # BLD AUTO: 0.02 10*3/MM3 (ref 0–0.05)
IMM GRANULOCYTES NFR BLD AUTO: 0.2 % (ref 0–0.5)
INR PPP: 0.99 (ref 0.89–1.12)
LYMPHOCYTES # BLD AUTO: 3.07 10*3/MM3 (ref 0.7–3.1)
LYMPHOCYTES NFR BLD AUTO: 38.2 % (ref 19.6–45.3)
MCH RBC QN AUTO: 32.5 PG (ref 26.6–33)
MCHC RBC AUTO-ENTMCNC: 34 G/DL (ref 31.5–35.7)
MCV RBC AUTO: 95.8 FL (ref 79–97)
MONOCYTES # BLD AUTO: 0.87 10*3/MM3 (ref 0.1–0.9)
MONOCYTES NFR BLD AUTO: 10.8 % (ref 5–12)
NEUTROPHILS NFR BLD AUTO: 3.48 10*3/MM3 (ref 1.7–7)
NEUTROPHILS NFR BLD AUTO: 43.5 % (ref 42.7–76)
NRBC BLD AUTO-RTO: 0 /100 WBC (ref 0–0.2)
PLATELET # BLD AUTO: 296 10*3/MM3 (ref 140–450)
PMV BLD AUTO: 8 FL (ref 6–12)
POTASSIUM SERPL-SCNC: 4.3 MMOL/L (ref 3.5–5.2)
PROT SERPL-MCNC: 7.8 G/DL (ref 6–8.5)
PROTHROMBIN TIME: 13.2 SECONDS (ref 12.2–14.5)
QT INTERVAL: 426 MS
QTC INTERVAL: 446 MS
RBC # BLD AUTO: 3.78 10*6/MM3 (ref 4.14–5.8)
SODIUM SERPL-SCNC: 139 MMOL/L (ref 136–145)
WBC NRBC COR # BLD AUTO: 8.03 10*3/MM3 (ref 3.4–10.8)

## 2024-09-25 PROCEDURE — 83036 HEMOGLOBIN GLYCOSYLATED A1C: CPT

## 2024-09-25 PROCEDURE — 85025 COMPLETE CBC W/AUTO DIFF WBC: CPT

## 2024-09-25 PROCEDURE — 85610 PROTHROMBIN TIME: CPT

## 2024-09-25 PROCEDURE — G0480 DRUG TEST DEF 1-7 CLASSES: HCPCS

## 2024-09-25 PROCEDURE — 93005 ELECTROCARDIOGRAM TRACING: CPT

## 2024-09-25 PROCEDURE — 80053 COMPREHEN METABOLIC PANEL: CPT

## 2024-09-25 PROCEDURE — 73700 CT LOWER EXTREMITY W/O DYE: CPT

## 2024-09-25 PROCEDURE — 85730 THROMBOPLASTIN TIME PARTIAL: CPT

## 2024-09-25 PROCEDURE — 36415 COLL VENOUS BLD VENIPUNCTURE: CPT

## 2024-09-25 PROCEDURE — 93010 ELECTROCARDIOGRAM REPORT: CPT | Performed by: STUDENT IN AN ORGANIZED HEALTH CARE EDUCATION/TRAINING PROGRAM

## 2024-09-25 RX ORDER — ASCORBIC ACID 500 MG
500 TABLET ORAL 2 TIMES DAILY
COMMUNITY

## 2024-09-25 NOTE — PAT
An arrival time for procedure was not provided during PAT visit. If patient had any questions or concerns about their arrival time, they were instructed to contact their surgeon/physician.  Additionally, if the patient referred to an arrival time that was acquired from their my chart account, patient was encouraged to verify that time with their surgeon/physician. Arrival times are NOT provided in Pre Admission Testing Department.    Patient viewed general PAT education video as instructed in their preoperative information received from their surgeon.  Patient stated the general PAT education video was viewed in its entirety and survey completed.  Copies of PAT general education handouts (Incentive Spirometry, Meds to Beds Program, Patient Belongings, Pre-op skin preparation instructions, Blood Glucose testing, Visitor policy, Surgery FAQ, Code H) distributed to patient if not printed. Education related to the PAT pass and skin preparation for surgery (if applicable) completed in PAT as a reinforcement to PAT education video. Patient instructed to return PAT pass provided today as well as completed skin preparation sheet (if applicable) on the day of procedure.     Additionally if patient had not viewed video yet but intended to view it at home or in our waiting area, then referred them to the handout with QR code/link provided during PAT visit.  Encouraged patient/family to read PAT general education handouts thoroughly and notify PAT staff with any questions or concerns. Patient verbalized understanding of all information and priority content.    Per Anesthesia Request, patient instructed not to take their ACE/ARB medications on the AM of surgery.    Prescription for Chlorhexidine shower called into patient's pharmacy or [facility] pharmacy by patient's surgeon.  Reinforced with patient to  the prescription from applicable pharmacy if they haven't already.  Verbal and written instructions given regarding proper  use of Chlorhexidine body wash to patient and/or famlily during PAT visit. Patient/family also instructed to complete checklist and return it to Pre-op on the day of surgery.  Patient and/or family verbalized understanding.    Patient to apply Chlorhexadine wipes  to surgical area (as instructed) the night before procedure and the AM of procedure. Wipes provided.    Patient denies any current skin issues.     Patient instructed to drink 20 ounces of Gatorade or Gatorlyte (if diabetic) and it needs to be completed 1 hour (for Main OR patients) or 2 hours (scheduled  section & [facility] patients) before given arrival time for procedure (NO RED Gatorade and NO Gatorade Zero).    Patient verbalized understanding.    Clean catch urinalysis not indicated because patient denied recent urinary frequency, urinary urgency, burning/pain upon urination, or flank pain. No recent UTIs.    [employer] (by [employer]) pain pump patient informational handout given to patient.  Instructed patient to watch [employer] Patient Education Video regarding Peripheral Nerve Catheter that will be in place for upcoming surgery unless contraindicated. The video can be accessed using QR code noted on handout.  Patient agreed to watch video.  Stressed to patient to call [employer] Nursing Hotline  if patient has any questions or concerns after discharge.     Discussed with patient options for receiving total joint replacement education and assessed patient's ability and preference. Joint Replacement Guide given to patient during PAT visit since not received a copy within the last year. Encouraged patient/family to read guide thoroughly and notify PAT staff with any questions or concerns. Handout provided directing patient to links to watch online videos related to joint replacement surgery on the [facility] website. The handout gives detailed instructions for joining an online joint replacement class through Microsoft Teams or phone  conference offered on the 1st and 3rd Thursdays of the month. Patient agreed to participate by watching videos online. Patient verbalized understanding of instructions. Encouraged to share information with family and/or . An overview of the joint replacement education was provided during the visit including general perioperative instructions that are routine for all surgical patients (PAT PASS, wipes, directions to pre-op, etc.).    Verified patient previously completed cardiology visit for cardiac risk assessment in preparation for upcoming procedure, completion of 12-lead ECG within six months, and risk assessment letter reviewed. No further interventions required.

## 2024-09-30 LAB
COTININE SERPL-MCNC: <1 NG/ML
NICOTINE SERPL-MCNC: <1 NG/ML

## 2024-10-04 ENCOUNTER — TRANSITIONAL CARE MANAGEMENT TELEPHONE ENCOUNTER (OUTPATIENT)
Dept: ORTHOPEDIC SURGERY | Facility: CLINIC | Age: 79
End: 2024-10-04
Payer: MEDICARE

## 2024-10-04 NOTE — OUTREACH NOTE
TOTAL JOINT REPLACEMENT CARE NAVIGATION INITIAL ASSESSMENT    PATIENT INFORMATION:     Patient: Werner Brower       YOB: 1945         Age/Gender: 79 y.o. male     Medical Record Number: [id number]     Surgery:    LT TKA                     Surgery Date: 10/09/24    Surgeon: DR. DANIELLE FIELDS     Physical Therapy Location: [facility] Date & Time: 10/10 2:00 PM    DVT PPX: ASPIRIN 81 MG BID        ATTEMPTED TO CALL PATIENT FOR PRE-OP CARE PLAN. RECEIVED NO ANSWER. LEFT VOICEMAIL FOR THE PATIENT TO RETURN MY CALL.

## 2024-10-07 ENCOUNTER — TRANSITIONAL CARE MANAGEMENT TELEPHONE ENCOUNTER (OUTPATIENT)
Dept: ORTHOPEDIC SURGERY | Facility: CLINIC | Age: 79
End: 2024-10-07
Payer: MEDICARE

## 2024-10-07 NOTE — OUTREACH NOTE
/TOTAL JOINT REPLACEMENT CARE NAVIGATION INITIAL ASSESSMENT    PATIENT INFORMATION:     Patient: Werner Brower       YOB: 1945         Age/Gender: 79 y.o. male     Medical Record Number: [id number]     Surgery:    LT TKA                             Surgery Date: 10/09/24    Surgeon: DR. DANIELLE FIELDS    Physical Therapy Location: [facility]    PT Date & Time: 10/10 @  PM    DVT PPX:  ASPIRIN 81 MG BID      LIVING SITUATION:     [x]Private Residence      Who lives in the home?  WIFE                        []Nursing Home:                                     []Assisted Living  []Rehabilitation Facility:                          []Live Alone  []Homeless    HOUSING STYLE:     []Ranch Style   []Multi-level   []Split level   []Townhouse   []Apartment    Do you have steps to navigate in the home? NO  Do you have steps to navigate outside the home? 3 SMALL STEPS      ADL:     [x]Independent   []Independent with difficulty   []Partially Dependent   []Dependent    Do you require bathing/showering? NO  Do you require assistance with grooming (brushing hair, shaving, etc)? NO  Do you require assistance dressing? NO  Do you require assistance with walking? NO  Do you require assistive devices while walking (cane, walker, wheelchair)? NO  Do you require assistance with transfers (moving from bed to chair, wheelchair, etc)? NO  Do you require assistance preparing meals? NO  Do you require assistance when eating meals? NO  Do you require assistance to use the toilet? NO   Do you have any issues with bowel or bladder incontinence? NO  Do you require assistance with household chores (cleaning, laundry, etc)? NO  Have you had any recent falls? NO    CURRENT SERVICES:    []Home Health (PT, OT, Skilled Nursing)  Agency:    County:  []Palliative Care  []Meals on Wheels  []Daily Caregiver  [x]None    CURRENT DME: EDUCATED ON ALL OTHER RECOMMENDED DME    [x]Walker   []Cane   []Wheelchair   []Crutches   [x]Bedside  Commode   []Shower Chair  []Hospital Bed   []Nebulizer   []Oxygen  []Other:    MEDICATIONS:    Are you currently on any blood thinners? ASPIRIN - ALREADY STOPPED  Are you currently on any anti-inflammatory medications? NO  Are you currently on any medications for rheumatoid arthritis? NO  Are you currently taking any herbal supplements? NO      Post-op Pharmacy Name:     [facility]                               Phone Number: [phone]    Does anyone assist you filling medication boxes or remind you that medication is due? NO  Are you currently on a mail order prescription plan? NO  What pharmacy do you use to fill your short-term prescriptions? [facility] [place]  Do you have prescription insurance coverage? YES  Can you afford your medications/co-pays? YES    CURRENT TRANSPORTATION:    Which services do you rely on? []Taxi   []Public Transportation   [x]Private Vehicle     []Ambulance   []Tack (Transportation Assistance [facility])    Do you have a way to get home when you are discharged? YES    POTENTIAL NEEDS: NONE    []Rehabilitation   []Home Health PT   []SNF  []Meals on Wheels   []Department of    []Palliative Care  []Nursing Home   []Hospice   []Durable Medical Equipment  []Caregiver Services   []Financial Services   []Pre-op In Home PT Evaluation    CLEARANCES NEEDED FOR SURGERY: OBTAINED AND SCANNED    [x]Dental   [x]Cardiology   []Pulmonology   []Medical (PCP)   []Rheumatology  []Endocrinology   []Hematology/Oncology   []Neurology   []Neurosurgery   []CT Vascular      INITIAL DISCHARGE PLAN: OUTPATIENT, D/C HOME WITH ASSISTANCE FROM WIFE AND SON. PATIENT HAS A WALKER, AND WAS EDUCATED ON ALL RECOMMENDED DME. PHYSICAL THERAPY WILL BEGIN WITH [facility] [place] ON 10/10. PATIENT WILL BEGIN TAKING ASPIRIN 81 MG BID AFTER SURGERY FOR DVT PROPHYLAXIS PER DR. FIELDS'S NOTE.

## 2024-10-08 ENCOUNTER — ANESTHESIA EVENT (OUTPATIENT)
Dept: PERIOP | Facility: HOSPITAL | Age: 79
End: 2024-10-08
Payer: MEDICARE

## 2024-10-08 RX ORDER — SODIUM CHLORIDE 0.9 % (FLUSH) 0.9 %
10 SYRINGE (ML) INJECTION EVERY 12 HOURS SCHEDULED
Status: CANCELLED | OUTPATIENT
Start: 2024-10-08

## 2024-10-08 RX ORDER — FAMOTIDINE 10 MG/ML
20 INJECTION, SOLUTION INTRAVENOUS ONCE
Status: CANCELLED | OUTPATIENT
Start: 2024-10-08 | End: 2024-10-08

## 2024-10-09 ENCOUNTER — ANESTHESIA (OUTPATIENT)
Dept: PERIOP | Facility: HOSPITAL | Age: 79
End: 2024-10-09
Payer: MEDICARE

## 2024-10-09 ENCOUNTER — APPOINTMENT (OUTPATIENT)
Dept: GENERAL RADIOLOGY | Facility: HOSPITAL | Age: 79
End: 2024-10-09
Payer: MEDICARE

## 2024-10-09 ENCOUNTER — ANESTHESIA EVENT CONVERTED (OUTPATIENT)
Dept: ANESTHESIOLOGY | Facility: HOSPITAL | Age: 79
End: 2024-10-09
Payer: MEDICARE

## 2024-10-09 ENCOUNTER — HOSPITAL ENCOUNTER (OUTPATIENT)
Facility: HOSPITAL | Age: 79
Discharge: HOME | End: 2024-10-09
Attending: ORTHOPAEDIC SURGERY | Admitting: ORTHOPAEDIC SURGERY
Payer: MEDICARE

## 2024-10-09 VITALS
TEMPERATURE: 97.8 F | RESPIRATION RATE: 18 BRPM | SYSTOLIC BLOOD PRESSURE: 170 MMHG | BODY MASS INDEX: 34.64 KG/M2 | HEART RATE: 78 BPM | OXYGEN SATURATION: 96 % | DIASTOLIC BLOOD PRESSURE: 75 MMHG | WEIGHT: 255.73 LBS | HEIGHT: 72 IN

## 2024-10-09 DIAGNOSIS — I50.32 CHRONIC DIASTOLIC (CONGESTIVE) HEART FAILURE: ICD-10-CM

## 2024-10-09 DIAGNOSIS — Z96.652 S/P TKR (TOTAL KNEE REPLACEMENT), LEFT: Primary | ICD-10-CM

## 2024-10-09 DIAGNOSIS — M17.12 PRIMARY OSTEOARTHRITIS OF LEFT KNEE: ICD-10-CM

## 2024-10-09 PROBLEM — M17.10 ARTHRITIS OF KNEE: Status: RESOLVED | Noted: 2024-08-29 | Resolved: 2024-10-09

## 2024-10-09 PROBLEM — M17.10 ARTHRITIS OF KNEE: Status: ACTIVE | Noted: 2024-10-09

## 2024-10-09 LAB
GLUCOSE BLDC GLUCOMTR-MCNC: 116 MG/DL (ref 70–130)
POTASSIUM SERPL-SCNC: 4.1 MMOL/L (ref 3.5–5.2)

## 2024-10-09 PROCEDURE — 27447 TOTAL KNEE ARTHROPLASTY: CPT | Performed by: PHYSICIAN ASSISTANT

## 2024-10-09 PROCEDURE — C1776 JOINT DEVICE (IMPLANTABLE): HCPCS | Performed by: ORTHOPAEDIC SURGERY

## 2024-10-09 PROCEDURE — 63710000001 PREGABALIN 75 MG CAPSULE: Performed by: ORTHOPAEDIC SURGERY

## 2024-10-09 PROCEDURE — G0378 HOSPITAL OBSERVATION PER HR: HCPCS

## 2024-10-09 PROCEDURE — A4648 IMPLANTABLE TISSUE MARKER: HCPCS | Performed by: ORTHOPAEDIC SURGERY

## 2024-10-09 PROCEDURE — 25010000002 LIDOCAINE PF 1% 1 % SOLUTION: Performed by: STUDENT IN AN ORGANIZED HEALTH CARE EDUCATION/TRAINING PROGRAM

## 2024-10-09 PROCEDURE — 97162 PT EVAL MOD COMPLEX 30 MIN: CPT

## 2024-10-09 PROCEDURE — A9270 NON-COVERED ITEM OR SERVICE: HCPCS | Performed by: ORTHOPAEDIC SURGERY

## 2024-10-09 PROCEDURE — 63710000001 POVIDONE-IODINE 10 % SOLUTION 30 ML BOTTLE: Performed by: ORTHOPAEDIC SURGERY

## 2024-10-09 PROCEDURE — 25010000002 DEXAMETHASONE PER 1 MG: Performed by: STUDENT IN AN ORGANIZED HEALTH CARE EDUCATION/TRAINING PROGRAM

## 2024-10-09 PROCEDURE — 82948 REAGENT STRIP/BLOOD GLUCOSE: CPT

## 2024-10-09 PROCEDURE — 63710000001 FAMOTIDINE 20 MG TABLET: Performed by: STUDENT IN AN ORGANIZED HEALTH CARE EDUCATION/TRAINING PROGRAM

## 2024-10-09 PROCEDURE — 97110 THERAPEUTIC EXERCISES: CPT

## 2024-10-09 PROCEDURE — 73560 X-RAY EXAM OF KNEE 1 OR 2: CPT

## 2024-10-09 PROCEDURE — C1755 CATHETER, INTRASPINAL: HCPCS | Performed by: ORTHOPAEDIC SURGERY

## 2024-10-09 PROCEDURE — 25010000002 KETOROLAC TROMETHAMINE PER 15 MG: Performed by: ORTHOPAEDIC SURGERY

## 2024-10-09 PROCEDURE — 25010000002 ROPIVACAINE PER 1 MG: Performed by: ORTHOPAEDIC SURGERY

## 2024-10-09 PROCEDURE — 20985 CPTR-ASST DIR MS PX: CPT | Performed by: ORTHOPAEDIC SURGERY

## 2024-10-09 PROCEDURE — 27447 TOTAL KNEE ARTHROPLASTY: CPT | Performed by: ORTHOPAEDIC SURGERY

## 2024-10-09 PROCEDURE — 25810000003 LACTATED RINGERS PER 1000 ML: Performed by: STUDENT IN AN ORGANIZED HEALTH CARE EDUCATION/TRAINING PROGRAM

## 2024-10-09 PROCEDURE — 25010000002 CEFAZOLIN PER 500 MG: Performed by: ORTHOPAEDIC SURGERY

## 2024-10-09 PROCEDURE — 25010000002 ROPIVACAINE HCL-NACL 0.2-0.9 % SOLUTION: Performed by: NURSE ANESTHETIST, CERTIFIED REGISTERED

## 2024-10-09 PROCEDURE — 25010000002 MORPHINE PER 10 MG: Performed by: ORTHOPAEDIC SURGERY

## 2024-10-09 PROCEDURE — 20985 CPTR-ASST DIR MS PX: CPT | Performed by: PHYSICIAN ASSISTANT

## 2024-10-09 PROCEDURE — 25010000002 PROPOFOL 10 MG/ML EMULSION: Performed by: STUDENT IN AN ORGANIZED HEALTH CARE EDUCATION/TRAINING PROGRAM

## 2024-10-09 PROCEDURE — 0055T BONE SRGRY CMPTR CT/MRI IMAG: CPT | Performed by: ORTHOPAEDIC SURGERY

## 2024-10-09 PROCEDURE — 25010000002 ONDANSETRON PER 1 MG: Performed by: STUDENT IN AN ORGANIZED HEALTH CARE EDUCATION/TRAINING PROGRAM

## 2024-10-09 PROCEDURE — 25010000002 LIDOCAINE 1% - EPINEPHRINE 1:100000 1 %-1:100000 SOLUTION 50 ML VIAL: Performed by: ORTHOPAEDIC SURGERY

## 2024-10-09 PROCEDURE — 63710000001 HYDROCODONE-ACETAMINOPHEN 5-325 MG TABLET

## 2024-10-09 PROCEDURE — 63710000001 ACETAMINOPHEN EXTRA STRENGTH 500 MG TABLET: Performed by: ORTHOPAEDIC SURGERY

## 2024-10-09 PROCEDURE — 25010000002 BUPIVACAINE (PF) 0.25 % SOLUTION: Performed by: NURSE ANESTHETIST, CERTIFIED REGISTERED

## 2024-10-09 PROCEDURE — A9270 NON-COVERED ITEM OR SERVICE: HCPCS | Performed by: STUDENT IN AN ORGANIZED HEALTH CARE EDUCATION/TRAINING PROGRAM

## 2024-10-09 PROCEDURE — 25010000002 CEFAZOLIN IN DEXTROSE 2000 MG/ 100 ML SOLUTION: Performed by: ORTHOPAEDIC SURGERY

## 2024-10-09 PROCEDURE — 25010000002 ROPIVACAINE HCL-NACL 0.2-0.9 % SOLUTION: Performed by: ORTHOPAEDIC SURGERY

## 2024-10-09 PROCEDURE — 25010000002 MEPIVACAINE HCL (PF) 1.5 % SOLUTION: Performed by: NURSE ANESTHETIST, CERTIFIED REGISTERED

## 2024-10-09 PROCEDURE — 84132 ASSAY OF SERUM POTASSIUM: CPT | Performed by: STUDENT IN AN ORGANIZED HEALTH CARE EDUCATION/TRAINING PROGRAM

## 2024-10-09 PROCEDURE — 25010000002 CEFAZOLIN PER 500 MG

## 2024-10-09 PROCEDURE — 97116 GAIT TRAINING THERAPY: CPT

## 2024-10-09 PROCEDURE — A9270 NON-COVERED ITEM OR SERVICE: HCPCS

## 2024-10-09 PROCEDURE — 63710000001 MELOXICAM 15 MG TABLET: Performed by: ORTHOPAEDIC SURGERY

## 2024-10-09 DEVICE — TIBIAL COMPONENT
Type: IMPLANTABLE DEVICE | Site: KNEE | Status: FUNCTIONAL
Brand: TRIATHLON

## 2024-10-09 DEVICE — IMPLANTABLE DEVICE: Type: IMPLANTABLE DEVICE | Site: KNEE | Status: FUNCTIONAL

## 2024-10-09 DEVICE — PATELLA
Type: IMPLANTABLE DEVICE | Site: KNEE | Status: FUNCTIONAL
Brand: TRIATHLON

## 2024-10-09 DEVICE — TIBIAL BEARING INSERT - CS
Type: IMPLANTABLE DEVICE | Site: KNEE | Status: FUNCTIONAL
Brand: TRIATHLON

## 2024-10-09 DEVICE — CRUCIATE RETAINING FEMORAL
Type: IMPLANTABLE DEVICE | Site: KNEE | Status: FUNCTIONAL
Brand: TRIATHLON

## 2024-10-09 DEVICE — STRATAFIX (SPIRAL PDS PLUS), BIDIRECTIONAL
Type: IMPLANTABLE DEVICE | Site: KNEE | Status: FUNCTIONAL
Brand: STRATAFIX

## 2024-10-09 RX ORDER — PREGABALIN 75 MG/1
75 CAPSULE ORAL ONCE
Status: COMPLETED | OUTPATIENT
Start: 2024-10-09 | End: 2024-10-09

## 2024-10-09 RX ORDER — ASPIRIN 81 MG/1
81 TABLET ORAL DAILY
Start: 2024-11-09

## 2024-10-09 RX ORDER — ONDANSETRON 2 MG/ML
4 INJECTION INTRAMUSCULAR; INTRAVENOUS ONCE AS NEEDED
Status: DISCONTINUED | OUTPATIENT
Start: 2024-10-09 | End: 2024-10-09 | Stop reason: HOSPADM

## 2024-10-09 RX ORDER — FENTANYL CITRATE 50 UG/ML
50 INJECTION, SOLUTION INTRAMUSCULAR; INTRAVENOUS
Status: DISCONTINUED | OUTPATIENT
Start: 2024-10-09 | End: 2024-10-09 | Stop reason: HOSPADM

## 2024-10-09 RX ORDER — DROPERIDOL 2.5 MG/ML
0.62 INJECTION, SOLUTION INTRAMUSCULAR; INTRAVENOUS ONCE AS NEEDED
Status: DISCONTINUED | OUTPATIENT
Start: 2024-10-09 | End: 2024-10-09 | Stop reason: HOSPADM

## 2024-10-09 RX ORDER — ASPIRIN 81 MG/1
81 TABLET ORAL EVERY 12 HOURS SCHEDULED
Status: DISCONTINUED | OUTPATIENT
Start: 2024-10-10 | End: 2024-10-09 | Stop reason: HOSPADM

## 2024-10-09 RX ORDER — ASPIRIN 81 MG/1
81 TABLET ORAL 2 TIMES DAILY
Qty: 60 TABLET | Refills: 0 | Status: SHIPPED | OUTPATIENT
Start: 2024-10-10

## 2024-10-09 RX ORDER — SODIUM CHLORIDE 0.9 % (FLUSH) 0.9 %
3-10 SYRINGE (ML) INJECTION AS NEEDED
Status: DISCONTINUED | OUTPATIENT
Start: 2024-10-09 | End: 2024-10-09 | Stop reason: HOSPADM

## 2024-10-09 RX ORDER — HYDROMORPHONE HYDROCHLORIDE 1 MG/ML
0.5 INJECTION, SOLUTION INTRAMUSCULAR; INTRAVENOUS; SUBCUTANEOUS
Status: DISCONTINUED | OUTPATIENT
Start: 2024-10-09 | End: 2024-10-09

## 2024-10-09 RX ORDER — ACETAMINOPHEN 500 MG
1000 TABLET ORAL EVERY 8 HOURS
Qty: 60 TABLET | Refills: 0 | Status: SHIPPED | OUTPATIENT
Start: 2024-10-09 | End: 2024-10-19

## 2024-10-09 RX ORDER — CEFAZOLIN SODIUM 2 G/100ML
2 INJECTION, SOLUTION INTRAVENOUS EVERY 8 HOURS
Status: DISCONTINUED | OUTPATIENT
Start: 2024-10-09 | End: 2024-10-09 | Stop reason: CLARIF

## 2024-10-09 RX ORDER — HYDROMORPHONE HYDROCHLORIDE 1 MG/ML
0.5 INJECTION, SOLUTION INTRAMUSCULAR; INTRAVENOUS; SUBCUTANEOUS
Status: DISCONTINUED | OUTPATIENT
Start: 2024-10-09 | End: 2024-10-09 | Stop reason: HOSPADM

## 2024-10-09 RX ORDER — TRANEXAMIC ACID 10 MG/ML
1000 INJECTION, SOLUTION INTRAVENOUS ONCE
Status: COMPLETED | OUTPATIENT
Start: 2024-10-09 | End: 2024-10-09

## 2024-10-09 RX ORDER — SODIUM CHLORIDE 9 MG/ML
INJECTION, SOLUTION INTRAVENOUS
Status: DISCONTINUED
Start: 2024-10-09 | End: 2024-10-09 | Stop reason: HOSPADM

## 2024-10-09 RX ORDER — MAGNESIUM HYDROXIDE 1200 MG/15ML
LIQUID ORAL AS NEEDED
Status: DISCONTINUED | OUTPATIENT
Start: 2024-10-09 | End: 2024-10-09 | Stop reason: HOSPADM

## 2024-10-09 RX ORDER — MELOXICAM 15 MG/1
15 TABLET ORAL ONCE
Status: COMPLETED | OUTPATIENT
Start: 2024-10-09 | End: 2024-10-09

## 2024-10-09 RX ORDER — NALOXONE HCL 0.4 MG/ML
0.1 VIAL (ML) INJECTION
Status: DISCONTINUED | OUTPATIENT
Start: 2024-10-09 | End: 2024-10-09 | Stop reason: HOSPADM

## 2024-10-09 RX ORDER — SODIUM CHLORIDE, SODIUM LACTATE, POTASSIUM CHLORIDE, CALCIUM CHLORIDE 600; 310; 30; 20 MG/100ML; MG/100ML; MG/100ML; MG/100ML
9 INJECTION, SOLUTION INTRAVENOUS CONTINUOUS
Status: DISCONTINUED | OUTPATIENT
Start: 2024-10-10 | End: 2024-10-09 | Stop reason: HOSPADM

## 2024-10-09 RX ORDER — DEXAMETHASONE SODIUM PHOSPHATE 4 MG/ML
INJECTION, SOLUTION INTRA-ARTICULAR; INTRALESIONAL; INTRAMUSCULAR; INTRAVENOUS; SOFT TISSUE AS NEEDED
Status: DISCONTINUED | OUTPATIENT
Start: 2024-10-09 | End: 2024-10-09 | Stop reason: SURG

## 2024-10-09 RX ORDER — MELOXICAM 15 MG/1
15 TABLET ORAL DAILY
Status: DISCONTINUED | OUTPATIENT
Start: 2024-10-10 | End: 2024-10-09 | Stop reason: HOSPADM

## 2024-10-09 RX ORDER — MELOXICAM 15 MG/1
15 TABLET ORAL DAILY
Qty: 15 TABLET | Refills: 0 | Status: SHIPPED | OUTPATIENT
Start: 2024-10-09 | End: 2024-10-24

## 2024-10-09 RX ORDER — PROPOFOL 10 MG/ML
VIAL (ML) INTRAVENOUS AS NEEDED
Status: DISCONTINUED | OUTPATIENT
Start: 2024-10-09 | End: 2024-10-09 | Stop reason: SURG

## 2024-10-09 RX ORDER — ONDANSETRON 4 MG/1
4 TABLET, FILM COATED ORAL EVERY 8 HOURS PRN
Qty: 20 TABLET | Refills: 0 | Status: SHIPPED | OUTPATIENT
Start: 2024-10-09

## 2024-10-09 RX ORDER — SODIUM CHLORIDE 0.9 % (FLUSH) 0.9 %
10 SYRINGE (ML) INJECTION AS NEEDED
Status: DISCONTINUED | OUTPATIENT
Start: 2024-10-09 | End: 2024-10-09 | Stop reason: HOSPADM

## 2024-10-09 RX ORDER — ACETAMINOPHEN 500 MG
1000 TABLET ORAL EVERY 8 HOURS
Status: DISCONTINUED | OUTPATIENT
Start: 2024-10-09 | End: 2024-10-09 | Stop reason: HOSPADM

## 2024-10-09 RX ORDER — BUPIVACAINE HYDROCHLORIDE 2.5 MG/ML
INJECTION, SOLUTION EPIDURAL; INFILTRATION; INTRACAUDAL
Status: COMPLETED | OUTPATIENT
Start: 2024-10-09 | End: 2024-10-09

## 2024-10-09 RX ORDER — OXYCODONE HYDROCHLORIDE 10 MG/1
10 TABLET ORAL EVERY 4 HOURS PRN
Status: DISCONTINUED | OUTPATIENT
Start: 2024-10-09 | End: 2024-10-09 | Stop reason: HOSPADM

## 2024-10-09 RX ORDER — FENTANYL CITRATE 50 UG/ML
50 INJECTION, SOLUTION INTRAMUSCULAR; INTRAVENOUS
Status: DISCONTINUED | OUTPATIENT
Start: 2024-10-09 | End: 2024-10-09

## 2024-10-09 RX ORDER — DOCUSATE SODIUM 100 MG/1
100 CAPSULE, LIQUID FILLED ORAL 2 TIMES DAILY
Qty: 30 CAPSULE | Refills: 0 | Status: SHIPPED | OUTPATIENT
Start: 2024-10-09 | End: 2024-10-24

## 2024-10-09 RX ORDER — OXYCODONE HYDROCHLORIDE 5 MG/1
5 TABLET ORAL EVERY 4 HOURS PRN
Status: DISCONTINUED | OUTPATIENT
Start: 2024-10-09 | End: 2024-10-09 | Stop reason: HOSPADM

## 2024-10-09 RX ORDER — HYDROCODONE BITARTRATE AND ACETAMINOPHEN 5; 325 MG/1; MG/1
1 TABLET ORAL ONCE AS NEEDED
Status: COMPLETED | OUTPATIENT
Start: 2024-10-09 | End: 2024-10-09

## 2024-10-09 RX ORDER — ONDANSETRON 2 MG/ML
4 INJECTION INTRAMUSCULAR; INTRAVENOUS EVERY 6 HOURS PRN
Status: DISCONTINUED | OUTPATIENT
Start: 2024-10-09 | End: 2024-10-09 | Stop reason: HOSPADM

## 2024-10-09 RX ORDER — FAMOTIDINE 20 MG/1
20 TABLET, FILM COATED ORAL ONCE
Status: COMPLETED | OUTPATIENT
Start: 2024-10-09 | End: 2024-10-09

## 2024-10-09 RX ORDER — ROPIVACAINE HYDROCHLORIDE 2 MG/ML
1 INJECTION, SOLUTION EPIDURAL; INFILTRATION; PERINEURAL CONTINUOUS
Start: 2024-10-09

## 2024-10-09 RX ORDER — ACETAMINOPHEN 500 MG
1000 TABLET ORAL ONCE
Status: COMPLETED | OUTPATIENT
Start: 2024-10-09 | End: 2024-10-09

## 2024-10-09 RX ORDER — LIDOCAINE HYDROCHLORIDE 10 MG/ML
0.5 INJECTION, SOLUTION EPIDURAL; INFILTRATION; INTRACAUDAL; PERINEURAL ONCE AS NEEDED
Status: DISCONTINUED | OUTPATIENT
Start: 2024-10-09 | End: 2024-10-09 | Stop reason: HOSPADM

## 2024-10-09 RX ORDER — OXYCODONE HYDROCHLORIDE 5 MG/1
5 TABLET ORAL EVERY 4 HOURS PRN
Qty: 30 TABLET | Refills: 0 | Status: SHIPPED | OUTPATIENT
Start: 2024-10-09 | End: 2024-10-18 | Stop reason: SDUPTHER

## 2024-10-09 RX ORDER — ONDANSETRON 4 MG/1
4 TABLET, ORALLY DISINTEGRATING ORAL EVERY 6 HOURS PRN
Status: DISCONTINUED | OUTPATIENT
Start: 2024-10-09 | End: 2024-10-09 | Stop reason: HOSPADM

## 2024-10-09 RX ORDER — HYDROCODONE BITARTRATE AND ACETAMINOPHEN 5; 325 MG/1; MG/1
TABLET ORAL
Status: COMPLETED
Start: 2024-10-09 | End: 2024-10-09

## 2024-10-09 RX ORDER — MIDAZOLAM HYDROCHLORIDE 1 MG/ML
0.5 INJECTION INTRAMUSCULAR; INTRAVENOUS
Status: DISCONTINUED | OUTPATIENT
Start: 2024-10-09 | End: 2024-10-09 | Stop reason: HOSPADM

## 2024-10-09 RX ORDER — LIDOCAINE HYDROCHLORIDE 10 MG/ML
INJECTION, SOLUTION EPIDURAL; INFILTRATION; INTRACAUDAL; PERINEURAL AS NEEDED
Status: DISCONTINUED | OUTPATIENT
Start: 2024-10-09 | End: 2024-10-09 | Stop reason: SURG

## 2024-10-09 RX ORDER — EPHEDRINE SULFATE 50 MG/ML
INJECTION INTRAVENOUS AS NEEDED
Status: DISCONTINUED | OUTPATIENT
Start: 2024-10-09 | End: 2024-10-09 | Stop reason: SURG

## 2024-10-09 RX ORDER — ONDANSETRON 2 MG/ML
INJECTION INTRAMUSCULAR; INTRAVENOUS AS NEEDED
Status: DISCONTINUED | OUTPATIENT
Start: 2024-10-09 | End: 2024-10-09 | Stop reason: SURG

## 2024-10-09 RX ORDER — SODIUM CHLORIDE 0.9 % (FLUSH) 0.9 %
3 SYRINGE (ML) INJECTION EVERY 12 HOURS SCHEDULED
Status: DISCONTINUED | OUTPATIENT
Start: 2024-10-09 | End: 2024-10-09 | Stop reason: HOSPADM

## 2024-10-09 RX ORDER — ROPIVACAINE HYDROCHLORIDE 2 MG/ML
INJECTION, SOLUTION EPIDURAL; INFILTRATION; PERINEURAL CONTINUOUS
Status: DISCONTINUED | OUTPATIENT
Start: 2024-10-09 | End: 2024-10-09 | Stop reason: HOSPADM

## 2024-10-09 RX ADMIN — MELOXICAM 15 MG: 15 TABLET ORAL at 11:22

## 2024-10-09 RX ADMIN — FAMOTIDINE 20 MG: 20 TABLET, FILM COATED ORAL at 11:22

## 2024-10-09 RX ADMIN — EPHEDRINE SULFATE 5 MG: 50 INJECTION INTRAVENOUS at 12:45

## 2024-10-09 RX ADMIN — EPHEDRINE SULFATE 5 MG: 50 INJECTION INTRAVENOUS at 13:02

## 2024-10-09 RX ADMIN — Medication 1000 MG: at 14:37

## 2024-10-09 RX ADMIN — ACETAMINOPHEN 1000 MG: 500 TABLET ORAL at 11:22

## 2024-10-09 RX ADMIN — PROPOFOL 77 MCG/KG/MIN: 10 INJECTION, EMULSION INTRAVENOUS at 12:24

## 2024-10-09 RX ADMIN — TRANEXAMIC ACID 1000 MG: 10 INJECTION, SOLUTION INTRAVENOUS at 13:37

## 2024-10-09 RX ADMIN — TRANEXAMIC ACID 1000 MG: 10 INJECTION, SOLUTION INTRAVENOUS at 12:24

## 2024-10-09 RX ADMIN — LIDOCAINE HYDROCHLORIDE 50 MG: 10 INJECTION, SOLUTION EPIDURAL; INFILTRATION; INTRACAUDAL; PERINEURAL at 12:24

## 2024-10-09 RX ADMIN — CEFAZOLIN SODIUM 2000 MG: 2 INJECTION, SOLUTION INTRAVENOUS at 14:39

## 2024-10-09 RX ADMIN — PREGABALIN 75 MG: 75 CAPSULE ORAL at 11:22

## 2024-10-09 RX ADMIN — DEXAMETHASONE SODIUM PHOSPHATE 4 MG: 4 INJECTION INTRA-ARTICULAR; INTRALESIONAL; INTRAMUSCULAR; INTRAVENOUS; SOFT TISSUE at 12:24

## 2024-10-09 RX ADMIN — PROPOFOL 50 MG: 10 INJECTION, EMULSION INTRAVENOUS at 12:24

## 2024-10-09 RX ADMIN — HYDROCODONE BITARTRATE AND ACETAMINOPHEN 1 TABLET: 5; 325 TABLET ORAL at 15:25

## 2024-10-09 RX ADMIN — EPHEDRINE SULFATE 10 MG: 50 INJECTION INTRAVENOUS at 12:42

## 2024-10-09 RX ADMIN — PROPOFOL 88 MCG/KG/MIN: 10 INJECTION, EMULSION INTRAVENOUS at 13:46

## 2024-10-09 RX ADMIN — ONDANSETRON 4 MG: 2 INJECTION INTRAMUSCULAR; INTRAVENOUS at 12:24

## 2024-10-09 RX ADMIN — BUPIVACAINE HYDROCHLORIDE 20 ML: 2.5 INJECTION, SOLUTION EPIDURAL; INFILTRATION; INTRACAUDAL; PERINEURAL at 14:32

## 2024-10-09 RX ADMIN — SODIUM CHLORIDE 2 G: 900 INJECTION INTRAVENOUS at 12:24

## 2024-10-09 RX ADMIN — CEFAZOLIN 2000 MG: 2 INJECTION, POWDER, LYOPHILIZED, FOR SOLUTION INTRAVENOUS at 14:38

## 2024-10-09 RX ADMIN — SODIUM CHLORIDE, POTASSIUM CHLORIDE, SODIUM LACTATE AND CALCIUM CHLORIDE 9 ML/HR: 600; 310; 30; 20 INJECTION, SOLUTION INTRAVENOUS at 11:21

## 2024-10-09 RX ADMIN — MEPIVACAINE HYDROCHLORIDE 4 ML: 15 INJECTION, SOLUTION EPIDURAL; INFILTRATION at 12:24

## 2024-10-09 NOTE — ANESTHESIA PROCEDURE NOTES
Spinal Block      Patient reassessed immediately prior to procedure    Patient location during procedure: OR  Start Time: 10/9/2024 12:24 PM  Stop Time: 10/9/2024 12:26 PM  Indication:at surgeon's request  Performed By  CRNA/WYATT: Justin Blanco CRNA  Preanesthetic Checklist  Completed: patient identified, IV checked, site marked, risks and benefits discussed, surgical consent, monitors and equipment checked, pre-op evaluation and timeout performed  Spinal Block Prep:  Patient Position:sitting  Sterile Tech:cap, gloves, sterile barriers and mask  Prep:Chloraprep  Patient Monitoring:blood pressure monitoring, continuous pulse oximetry and EKG    Spinal Block Procedure  Approach:midline  Guidance:landmark technique and palpation technique  Location:L4-L5  Needle Type:Quincke  Needle Gauge:22 G  Placement of Spinal needle event:cerebrospinal fluid aspirated  Paresthesia: no  Fluid Appearance:clear  Medications: Mepivacaine HCl (PF) (CARBOCAINE) 1.5 % injection - Injection   4 mL - 10/9/2024 12:24:00 PM   Post Assessment  Patient Tolerance:patient tolerated the procedure well with no apparent complications  Complications no  Additional Notes  Procedure:  Pt assisted to sitting position, with legs in position of comfort over side of bed.  Pt. instructed in optimal spine presentation, the spine was prepped/ Draped and the skin at insertion site was anesthetized with 1% Lidocaine 2 ml.  The spinal needle was then advanced until CSF flow was obtained and LA was injected:

## 2024-10-09 NOTE — ANESTHESIA POSTPROCEDURE EVALUATION
Patient: Werner Brower    Procedure Summary       Date: 10/09/24 Room / Location:  AUTUMN OR 14 /  AUTUMN OR    Anesthesia Start: 1223 Anesthesia Stop: 1441    Procedure: TOTAL KNEE ARTHROPLASTY WITH MARISELA ROBOT (Left: Knee) Diagnosis:       Primary osteoarthritis of left knee      (Primary osteoarthritis of left knee [M17.12])    Surgeons: Mathew Gill MD Provider: Richard Brown MD    Anesthesia Type: spinal, regional ASA Status: 3            Anesthesia Type: spinal, regional    Vitals  Vitals Value Taken Time   /71 10/09/24 1435   Temp     Pulse 66 10/09/24 1440   Resp     SpO2 98 % 10/09/24 1440   Vitals shown include unfiled device data.        Post Anesthesia Care and Evaluation    Patient location during evaluation: PACU  Patient participation: complete - patient participated  Level of consciousness: awake and alert  Pain score: 0  Pain management: adequate    Airway patency: patent  Anesthetic complications: No anesthetic complications  PONV Status: none  Cardiovascular status: hemodynamically stable and acceptable  Respiratory status: nonlabored ventilation, acceptable and nasal cannula  Hydration status: acceptable  Post Neuraxial Block status: No signs or symptoms of PDPH

## 2024-10-09 NOTE — DISCHARGE INSTRUCTIONS
"DISCHARGE INSTRUCTIONS   Dr. Gill     Total Knee Replacement/ Partial (Uni) Knee Replacement     Wound Care   1) Keep wound / incision area clean and dry.   2) Dressing to remain in place until post-operative day 7. Upon dressing removal, assess for wound drainage. If no drainage is present, keep wound / incision area open to air as much as possible. If drainage is present, place sterile dressing to cover wound and assess daily. If drainage continues to occur after post-operative day 14, call the office for an urgent appointment. (You should be seen in the clinic within 1-2 days of calling). DO NOT REMOVE SUTURES (IF PRESENT) UNDER ANY CIRCUMSTANCES PRIOR TO FOLLOW UP APPOINTMENT.  3) No baths or swimming until otherwise instructed. The wound must remain dry for 10 days after surgery. After 10 days, you may begin to shower only if no drainage is present. No submerging the wound under standing water until cleared by your physician (no baths, hot tubs, swimming pools, etc). Sponge baths are the best way to perform personal hygiene while at the same time protecting the wound from moisture.   4) Prior to showering, the wound must remain dry for 72 consecutive hours (no drainage whatsoever) prior to showering. If the wound drains or spots, the clock \"resets\" - make sure the wound has been drainage-free for 72 consecutive hours.   5) Once you are allowed to get the wound wet, please use gentle soap to wash the wound area. DO NOT aggressively scrub the wound with a washcloth or bath sponge. Please visually inspect your wound(s) at least once daily. If the wound(s) are in a difficult to see location, please use a mirror or have someone else assist with visual inspection.   6) No scrubbing the wound. You may \"pad dry\" the wound, but do not rub, as this may open up the wound and pre-dispose to wound infection.   7) Do not apply lotions or creams to incision site, unless instructed otherwise.   8) Observe for redness, " "swelling, or drainage. Please call the clinic immediately if you have fevers, chills with warmth/redness surrounding wound site or if you notice pus drainage from the wound site     Activity   No heavy lifting objects greater than 10 pounds.   No driving while on narcotic pain medication.   No submerging wound under standing water (pool, bath tub, etc.) until otherwise instructed.   You may be protected weightbearing as tolerated on your operative (left lower) extremity   Use crutches or a walker for ambulation.   Wean as appropriate per physical therapist's discretion.   Do not sleep with a pillow behind your knee. You may sleep with a pillow behind your Achilles or foot. This will prevent your knee from getting stiff in the flexed (\"bent\") position and will encourage full extension (leg straightening).   Be vigilant in terms of working on full knee extension and flexion. Your goal should be 0 to 90 degrees by 2 weeks post-op - MINIMUM!   Knee range of motion as tolerated.    Blood Clot Prophylaxis   (Aspirin vs. Lovenox vs. Eliquis administration is determined by your surgeon and tailored to your specific risk profile. You will be discharged with one of these medications.) You will need to complete a total 4 week course of enteric coated aspirin 325 mg (or 81mg) twice daily or Eliquis 2.5mg twice daily, in order to minimize your risk of blood clots following surgery. You will be supplied with a prescription to obtain this. Alternatively, you will need to compete a total 2 week course of Lovenox after surgery (followed by a 2 week course of aspirin twice daily), in order to minimize the risk of blood clots following surgery. Lovenox requires a single shot in the abdomen, to be taken once daily. You will be supplied with the prescription to obtain this. Prior to your discharge from the hospital, the nursing staff will instruct you on self-administration of the Lovenox, if you will be returning directly home from the " "hospital.     Discharge Pain Medications   You will be given a prescription for pain medication. You should start taking this the same day after your surgery. Wean off as tolerated. Do not wait to take the pain medication until the pain is severe, as it will be difficult to \"catch up\" once this occurs. The pain medication usually reaches its full effect ~1 hour after ingesting. If you have been sent home on Colace, this medication should be taken until you are off all narcotic (i.e. Oxycodone, etc) pain medications, in order to prevent constipation. If you have been sent home with a combination of oxycodone and Tylenol, please take Tylenol as scheduled.  You must be careful not to exceed 4,000mg (4 grams) of Tylenol. The oxycodone is to be taken as needed for \"breakthrough\" pain.  Some common side effects of the narcotic pain medications include nausea and itching. Benadryl is a great over the counter medication that helps calm your stomach, decreases your anxiety levels, and minimizes the itching. You can easily purchase this at your local pharmacy as an over-the-counter medication. Please abide by the instructions as printed on the bottle. If your nausea persists, make sure to take small amounts of crackers or other lighter foods.     Follow-Up   Follow-up with Dr. Gill's office in 3 weeks from the surgery date for a post-operative evaluation. Have the following xrays done upon arrival to the follow-up appointment: 3 views of operative knee. Please call Dr. Gill's office at [phone] for orthopaedic appointments or questions.In[employer] - Patient Information    What is a pain pump?  The InfuBLOCK pump delivers post-operative, non-narcotic, numbing medication to the nerve near the surgical site for pain relief.     Where can I find information about my pain pump?           For more information about your pain pump, scan the QR code.  For additional patient resources, visit " [email or web address].                                                                                               While your physician is your primary source for information about your treatment there may be times during your treatment that you need assistance with your infusion pump.     If you need assistance take the following steps:    The [facility] [employer] [facility] Nursing Hotline is Here for You 24/7.  Please call [phone] for the following concerns or complications:    Answers to questions about your infusion pump                 Tubing disconnect  Assistance with pump alarms                                                      Dislodged catheter  Excessive leakage noted from pump                                         Inadequate pain control    2.   [facility] Anesthesia Acute Pain Service: [phone] is available 24/7 for any further needs or concerns about medication or pain control.     -------------------------------------------------------------------------    Nerve Catheter Removal Instructions  When your device is empty:    Remove your catheter by pulling the dressing off slowly (like you would remove a regular bandage). The catheter should pull right out of the skin.  Check that the BLUE tip is intact.                                                                                     If the catheter is stuck, reposition your   extremity and pull slowly until removed.  *If catheter is HURTING and WON'T come out, stop and call [phone] for further assistance.    Remove medication bag from the black carrying case.  Cut the tubing on right and left side of pump, and discard the medication bag and tubing into garbage.  Place the pump and black carrying case into the plastic bag and then place this into the return box.  Seal box with blue stickers and return to [employer] postal service. THIS IS PRE-PAID  POSTAGE.        -------------------------------------------------------------------------    SMI COLD THERAPY - PATIENT INSTRUCTION SHEET    Cold Compression Therapy for your comfort and rehabilitation  Your caregivers want you to be productive in your rehab and comfortable during your stay. In keeping with those goals, you will be receiving an SMI Cold Therapy Wrap to help ease post-operative pain and swelling that might keep you from getting back on track! Your SMI Cold Therapy Wrap is effective and simple-to-use, and you will be encouraged to apply it throughout your hospital stay and at home through the duration of your recovery.    When you are ready to go home  Be sure to take your SMI Cold Therapy Wrap and both sets of Gel Bags with you for continued comfort and use throughout your rehabilitation. If you don't already have them, ask your nurse or aide to retrieve your SMI Gel Bags from the patient freezer.    Home use precautions  Always follow your medical professional's application instructions upon discharge. Your SMI Cold Therapy Wrap and Gel Bags are designed to last for months following your surgery. Never heat the Gel Bags unless specified by your healthcare provider. Supervision is advised when using this product on children or geriatric patients. To avoid danger of suffocation, please keep the outer plastic packaging away from children & pets.    Cold Therapy Instructions  Place Gel Bags in a freezer set ¾ of the way to max temperature for at least (4) hours. For best results, lay the Gel Bags flat and [id number] in the freezer. Once frozen, slide Gel Bags into the gel pouch and secure your wrap to the affected area with the straps.  Gel wraps that have been stored in a freezer for an extended period of time may require a (10) minute period of softening up in a room temperature environment before application.  The gel pouch acts as a protective barrier. NEVER place frozen bags directly onto skin,  as this may cause frostbite injury.  The SMI Cold Therapy Wrap is designed to be able to be worm while ambulating. The compression straps can be secured well enough so that the Wrap won't fall off while moving.  Wrap Application Videos can be viewed at [facility].  An additional protective barrier such as clothing, a washcloth, hand-towel or pillowcase may be used during prolonged treatment applications.  The Gel-Pouch and Wrap are both Latex-Free and the Gel Bag ingredients are non toxic.    SMI Wrap care instructions  The SMI Cold Therapy Wrap may be hand washed and hung to dry when needed.    [facility] re-order information  Additional [facility] body specific wraps and/or Gel Bags can be re-ordered from [facility] or call [employer] ([phone])        [facility] COLD THERAPY - PATIENT INSTRUCTION SHEET    Cold Compression Therapy for your comfort and rehabilitation  Your caregivers want you to be productive in your rehab and comfortable during your stay. In keeping with those goals, you will be receiving an SMI Cold Therapy Wrap to help ease post-operative pain and swelling that might keep you from getting back on track! Your SMI Cold Therapy Wrap is effective and simple-to-use, and you will be encouraged to apply it throughout your hospital stay and at home through the duration of your recovery.    When you are ready to go home  Be sure to take your SMI Cold Therapy Wrap and both sets of Gel Bags with you for continued comfort and use throughout your rehabilitation. If you don't already have them, ask your nurse or aide to retrieve your SMI Gel Bags from the patient freezer.    Home use precautions  Always follow your medical professional's application instructions upon discharge. Your SMI Cold Therapy Wrap and Gel Bags are designed to last for months following your surgery. Never heat the Gel Bags unless specified by your healthcare provider. Supervision is advised when using this product on children  or geriatric patients. To avoid danger of suffocation, please keep the outer plastic packaging away from children & pets.    Cold Therapy Instructions  Place Gel Bags in a freezer set ¾ of the way to max temperature for at least (4) hours. For best results, lay the Gel Bags flat and [id number] in the freezer. Once frozen, slide Gel Bags into the gel pouch and secure your wrap to the affected area with the straps.  Gel wraps that have been stored in a freezer for an extended period of time may require a (10) minute period of softening up in a room temperature environment before application.  The gel pouch acts as a protective barrier. NEVER place frozen bags directly onto skin, as this may cause frostbite injury.  The [facility] Cold Therapy Wrap is designed to be able to be worm while ambulating. The compression straps can be secured well enough so that the Wrap won't fall off while moving.  Wrap Application Videos can be viewed at [email or web address].  An additional protective barrier such as clothing, a washcloth, hand-towel or pillowcase may be used during prolonged treatment applications.  The Gel-Pouch and Wrap are both Latex-Free and the Gel Bag ingredients are non toxic.    SMI Wrap care instructions  The [facility] Cold Therapy Wrap may be hand washed and hung to dry when needed.    [facility] re-order information  Additional [facility] body specific wraps and/or Gel Bags can be re-ordered from [employer] or call [employer] ([phone])

## 2024-10-09 NOTE — H&P
Pre-Op H&P  Werner Brower  [id number]  1945      Chief complaint: Left Knee Pain      Subjective:  Patient is a 79 y.o.male presents for scheduled surgery by Dr. Gill. He anticipates a TOTAL KNEE ARTHROPLASTY WITH MARISELA ROBOT - Left today.  The patient has had left knee pain for the past 7 years.  The pain is now present most of the time, regardless of movement.  He denies the use of a walker or assistive device to ambulate.  Up to this point, conservative treatment methods have failed to alleviate his pain.    Review of Systems:  Constitutional-- No fever, chills or sweats. No fatigue.  CV-- No chest pain, palpitation or syncope. +HTN, HLD.  Resp-- No SOB, cough, hemoptysis. +KIRSTIN w/o CPAP use.  Skin--No rashes or lesions      Allergies:   Allergies   Allergen Reactions    Ace Inhibitors Other (See Comments)     Other reaction(s): cough    Cefuroxime GI Intolerance    Nitrofurantoin Other (See Comments)     GI INTOLERANCE         Home Meds:  Medications Prior to Admission   Medication Sig Dispense Refill Last Dose    ascorbic acid (QC Vitamin C with Loreat Hips) 500 MG tablet Take 1 tablet by mouth 2 (Two) Times a Day.       aspirin 81 MG EC tablet Take 1 tablet by mouth Daily.       carboxymethylcellulose (REFRESH PLUS) 0.5 % solution Daily As Needed for Dry Eyes.       carvedilol (COREG) 25 MG tablet Take 1 tablet by mouth 2 (Two) Times a Day.       Chlorhexidine Gluconate 4 % solution Shower daily with hibiclens solution as directed 5 days prior to surgery. 236 mL 0     Diclofenac Sodium (VOLTAREN) 1 % gel gel Apply 4 g topically to the appropriate area as directed 4 (Four) Times a Day As Needed.       losartan (COZAAR) 100 MG tablet Take 1 tablet by mouth Daily.       multivitamin with minerals (Multivitamin Men 50+) tablet tablet Take 1 tablet by mouth Daily.       multivitamins-minerals (PRESERVISION AREDS 2) capsule capsule Daily.       rosuvastatin (CRESTOR) 10 MG tablet Take 1 tablet by mouth  "Daily. 90 tablet 2     tamsulosin (FLOMAX) 0.4 MG capsule 24 hr capsule Take 1 capsule by mouth Daily. 90 capsule 2          PMH:   Past Medical History:   Diagnosis Date    Allergic several years ago    Arrhythmia     Arthritis     Back pain     BPH associated with nocturia     Cervical disc disorder     Colon polyp several years ago    Condition not found     BROKEN ELBOW    Erectile dysfunction 3 years ago    Essential hypertension     Gallbladder problem     Gonarthrosis     BILATERAL    H/O colonoscopy     Heart murmur many years ago    High risk medication use     DRUG THERAPY FINDING    History of circumcision     HL (hearing loss) many years ago    Hyperlipidemia many years ago    Knee sprain     Migraine headache     Peptic ulceration     PONV (postoperative nausea and vomiting)     RLS (restless legs syndrome)     Rotator cuff syndrome     Skin problem     Sleep apnea     DOES NOT WEAR CPAP    Urinary tract infection several times 4 years ago    Visual impairment many years ago     PSH:    Past Surgical History:   Procedure Laterality Date    BLEPHAROPLASTY      CHOLECYSTECTOMY      CIRCUMCISION      COLONOSCOPY  2010    SENSILE POLYPS 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, EXT HEMMORHOIDS REPEAT 3 YEARS ([facility])    ELBOW ARTHROSCOPY Left     JOINT REPLACEMENT  right knee 2017       Immunization History:  Influenza: Yes  Pneumococcal: Yes  Tetanus: Yes  Covid : x4    Social History:   Tobacco:   Social History     Tobacco Use   Smoking Status Former    Current packs/day: 0.00    Types: Cigarettes, Cigars    Quit date: 1975    Years since quittin.8   Smokeless Tobacco Never      Alcohol:     Social History     Substance and Sexual Activity   Alcohol Use Not Currently         Physical Exam:/73 (BP Location: Right arm, Patient Position: Lying)   Pulse 65   Temp 97.1 °F (36.2 °C) (Temporal)   Resp 18   Ht 182.9 cm (72.01\")   Wt 116 kg (255 lb 11.7 oz)   SpO2 96%   BMI 34.68 kg/m² "       General Appearance:    Alert, cooperative, no distress, appears stated age   Head:    Normocephalic, without obvious abnormality, atraumatic   Lungs:     Clear to auscultation bilaterally, respirations unlabored    Heart:   Regular rate and rhythm, S1 and S2 normal    Abdomen:    Soft without tenderness   Extremities:   Extremities normal, atraumatic, no cyanosis or edema   Skin:   Skin color, texture, turgor normal, no rashes or lesions   Neurologic:   Grossly intact     Results Review:     LABS:  Lab Results   Component Value Date    WBC 8.03 09/25/2024    HGB 12.3 (L) 09/25/2024    HCT 36.2 (L) 09/25/2024    MCV 95.8 09/25/2024     09/25/2024    NEUTROABS 3.48 09/25/2024    GLUCOSE 135 (H) 09/25/2024    BUN 16 09/25/2024    CREATININE 1.07 09/25/2024     09/25/2024    K 4.3 09/25/2024     09/25/2024    CO2 26.0 09/25/2024    CALCIUM 9.3 09/25/2024    ALBUMIN 3.9 09/25/2024    AST 18 09/25/2024    ALT 17 09/25/2024    BILITOT 0.4 09/25/2024       RADIOLOGY:  Imaging Results (Last 72 Hours)       ** No results found for the last 72 hours. **            I reviewed the patient's new clinical results.    Cancer Staging (if applicable)  Cancer Patient: __ yes _x_no __unknown; If yes, clinical stage T:__ N:__M:__, stage group or __N/A      Impression: Primary osteoarthritis of left knee      Plan: TOTAL KNEE ARTHROPLASTY WITH MARISELA ROBOT - Left       Brannon Camacoh, APRN   10/9/2024   11:43 EDT

## 2024-10-09 NOTE — OP NOTE
OPERATIVE REPORT     DATE OF PROCEDURE: 10/9/2024    SURGEON: Mathew Gill M.D.     ASSISTANT(S): Circulator: Dena Philippe RN; Delmi Turcios RN; Angy Celis RN  Scrub Person: Shira Bobby; Sophie Yi  Vendor Representative: Ba Diaz  Nursing Assistant: Amanda Saleh  Assistant: Melina Martínez PA-C  Assistant: Melina Martínez PA-C    Note-PA was utilized during the case to facilitate positioning the patient, exposure, retraction, placement of final components and definitive closure.    PREOPERATIVE DIAGNOSIS: Advanced degenerative joint disease of the left knee secondary to osteoarthritis    POSTOPERATIVE DIAGNOSIS: same     PROCEDURE: Left total Knee Arthroplasty CPT 44264, Use of computer-assisted surgical navigation system CPT 01484, 0055T CT utilization for preoperative planning of robotic total knee arthroplasty    SURGICAL DETAILS:     APPROACH: Medial parapatellar     ANESTHESIA: Spinal plus local periarticular block    PREOPERATIVE ANTIBIOTICS: Ancef 2 g IV    TRANEXAMIC ACID: IV    TOURNIQUET TIME: 64 min @300 mmHg     ESTIMATED BLOOD LOSS: 50 cc     SPECIMENS: None    IMPLANTS:   /Brand: [employer] triathlon  Tibial component size: 7 pressfit tritanium baseplate   Femoral component size: 6 pressfit cruciate retaining   Tibial polyethylene insert: 10 mm cruciate stabilizing   Patellar component: 35 mm asymmetric tritanium  Cement: None    DRAINS: None    LOCAL INJECTION: 1 cc Toradol 30mg/ml, 4 cc duramorph 2mg/ml, 20 cc 0.5% ropivicaine, 20 cc 0.5% lidocaine with 1:200,000 epinephrine, 15 cc preservative free normal saline     MODIFIER(S): None    COMPLICATIONS: None apparent    INDICATIONS FOR PROCEDURE: The patient has a long history of progressive knee pain, arthritis, and degeneration resulting in deformity in the left knee from predominantly lateral wear and bone loss. Non-operative treatment and conservative therapeutic measures have been  attempted, but have not improved or controlled the symptoms and pain that occurs during normal daily activities. Knee motion has also become limited and is restricting the patient. Total knee arthroplasty was recommended. The risks, benefits, alternatives, and potential complications of the arthroplasty surgery were discussed with the patient in detail to include but not be limited to infection, bleeding, anesthesia risks, damage to neurovascular structures, osteolysis, aseptic loosening, instability, anterior knee pain, continued pain, iatrogenic fracture, dislocation, need for future surgery including the potential for amputation, blood clots, myocardial infarction, stroke, and death. Specific details of the surgical procedure, hospitalization, recovery, rehabilitation, and long-term precautions were also presented. Pre-operative teaching was provided. Implant/prosthesis selection was outlined, and the many options available were explained; the final choice will be made at the time of the procedure to match the anatomy and condition of the bone, ligaments, tendons, and muscles. The patient completed preoperative medical optimization and risk assessment, joint arthroplasty education, and MRSA decolonization using a universal decolonization protocol. Perioperative blood management and the potential for blood transfusion were discussed with risks and options clearly outlined.     INTRAOPERATIVE FINDINGS: Advanced tricompartmental osteoarthritis with genu valgum alignment    PROCEDURE: The patient was identified in the preoperative holding area. The operative site was confirmed and marked. A sequential compression device was placed on the nonoperative leg. The risks, benefits, and alternatives to surgery were again confirmed with the patient and the patient wished to proceed. The patient was brought to the operating room and placed on the operating room table in the supine position. All bony prominences were padded. A  huddle was performed with the patient and all vital surgical team members to confirm the correct operative site, procedure, anesthesia type, and operative plan with the patient. After anesthesia was performed, a tourniquet was applied to the upper thigh of the operative leg. A full knee exam was performed once anesthesia was in full effect. Intravenous antibiotic prophylaxis was given and confirmed with the anesthesia team.     The operative leg was prepped and draped in the usual sterile fashion. A surgical time out was performed immediately preceding the incision with all personnel in the operating room to confirm patient identity, the correct operative site and extremity, correct radiographic studies, availability of appropriate surgical equipment and agreement on the planned procedure. The operative knee was elevated and exsanguinated using an esmarch and the tourniquet was inflated. The knee was exposed using a limited anterior-midline skin incision. Dissection was carried down through skin and subcutaneous tissue to the extensor mechanism with a scalpel. A medial parapatellar arthrotomy was made to enter the knee space sharply. A large amount of normal appearing joint fluid was encountered and suctioned. The synovium was thickened, hypertrophic, and inflamed. A partial synovectomy was performed for exposure, and the medial and lateral gutters were cleared of scar and synovial reflections. The superficial medial collateral ligament was carefully elevated off osteophytes .  The patellar synovial reflections were released and the patella exposed to reveal complete wear through the articular surface. The trochlea demonstrated similar severe wear. The patella was then subluxed laterally. The knee was then flexed up to 90 degrees.     Assessment of the knee joint revealed severe end-stage articular damage with no remaining lateral weight bearing cartilage. The lateral compartment was severely eburnated with bone  loss on the lateral tibia and lateral femoral condyle, resulting in the valgus deformity.     With the exposure complete, femoral pins for navigation were drilled and placed intra-incisional in the region of the medial distal femoral metaphysis just proximal to the medial epicondyle.  Tibial pins for navigation were then placed extra-incisional 4 fingerbreadths below the tibial tubercle taking care to engage the far cortex of the tibia but not perforate the cortex.  The navigation arrays were then placed onto the pins, adjusted, and tightened definitively.  The femoral and tibial checkpoints were then placed.    The knee was then taken through range of motion to find the hip center.  The medial and lateral malleolus were then marked to find the ankle center. Next, using a rongeur and osteotome, marginal osteophytes were then removed from the tibia and the femur and the ACL resected.  Baseline measurements of the knee were then taken and the knee was balanced with adjustments made to varus and valgus on the femur and tibia as well as femoral rotation.  Adjustments were as follows:    Femur: 0.5 degrees valgus, 1.5 degree external rotation relative to the transepicondylar axis.  1 mm anterior and 0.5 mm distal translation  Tibia: Neutral, 3 degrees posterior slope, 2 mm proximal translation    Satisfied with the balance of the knee, attention was turned to bony resection.  The tibia was cut first and the tibial bone removed.  A tensioner was then placed on the resected surface to tension the knee in both flexion and extension.  The adjustments made precut were found to remain grossly unchanged with overall good alignment and balance of the knee in flexion and extension.  Next, femoral cuts were made starting with the posterior femoral cuts followed by the anterior femoral cut, followed by the anterior chamfer.  The sawblade was then changed and the distal femoral cut and posterior chamfers were completed.    SIRISHA blanchard   was placed with the knee in 90 degrees of flexion and a large curved osteotome, rongeur, and curettes were utilized to clear posterior osteophytes. The medial/lateral meniscal remnants were then excised along with any loose bodies.     A femoral trial implant was placed; excellent fit was confirmed. The medial-lateral and anterior-posterior dimensions were checked; anatomic fit and coverage were achieved.The proximal tibia baseplate trial was placed with its mid-point at the junction of medial one-third and lateral two-thirds of the tibial tubercle and pinned to this fixed position. A trial reduction was then performed. Trial reduction demonstrated the knee achieved full extension with excellent stability and range of motion, and no tendency toward instability with varus-valgus stress at full extension, mid-flexion, or 90 degrees of flexion. The PCL was also found to be appropriately tensioned with normal posterior tibial excursion.  The tibia and femoral pins and arrays were then removed along with the femoral and tibial checkpoints.    Next, attention was turned to the patella. It was measured and the posterior 9-10 mm was resected leaving a healthy remnant with greater than 11mm thickness. The patella was sized with the asymmetric guide, and drill holes were made. A trial button was placed and tracking of the patella and the entire knee trial was tested. The patella tracking was excellent throughout range of motion with no instability. Punches and drills were then placed through the trials to accommodate the final implants. All trials were removed.     The wound was copiously lavaged with a pulse irrigation/suction system. The posterior recess of the knee and areas of known bleeding were treated with the electrocautery to reduce post-operative bleeding. A pain cocktail was injected into the barbie-articular tissues. The cut bone surfaces were then irrigated again, suctioned, and dried. The final implants  were impacted into place, tibia followed by tibial polyethylene followed by femur followed by patella. The tourniquet was released and no excessive bleeding was encountered. Synovial bleeding was further treated with the electrocautery until adequate hemostasis was obtained.     The wound was again irrigated with dilute betadine solution followed by saline. The extensor mechanism and capsule was then anatomically closed with interrupted #1 Vicryl suture and a running #2 Stratafix stitch. Knee stability and range of motion with the capsule closed was excellent, and range of motion was 0 to 135 degrees without excessive stress on the repair. Instrument and sponge count was completed and confirmed correct. Deep and superficial subcutaneous tissue was closed with interrupted 2-0 Vicryl suture. A running 3-0 Monocryl subcuticular stitch was used to re-approximate the skin edges followed by skin glue adhesive to seal the wound. A silver impregnated dressing was then placed over the knee incision and a Covaderm over the tibial pin incisions, followed by a sequential compression device to the operative limb. The patient was sufficiently recovered from anesthesia, transferred to a hospital bed and taken to the PACU in stable condition.     One gram (1000 mg) of intravenous tranexamic acid was administered prior to incision. A second one gram (1000 mg) intravenous dose was given prior to wound closure.    No apparent complications occurred during the procedure. Instrument, sponge and needle counts were correct x 2.     The patient underwent risk stratification preoperatively and aspirin was chosen for DVT prophylaxis. Delay in starting chemical prophylaxis for 23 hours from surgical incision was over concerns for hematoma formation and wound related issues.     POST OPERATIVE PLAN:   Weight bearing as tolerated with knee range of motion as tolerated   Pain control with PO/IV meds   Adductor canal catheter placement by  Anesthesia Pain Management Team in PACU.   23 hours perioperative antibiotic prophylaxis   PT/OT for mobilization and medical equipment needs   Keep silver dressing in place for 7 days post op. Change dressing only if saturated.   SCDs to bilateral lower extremities   Social work for discharge planning needs   Follow up in 3 weeks for post operative wound check with 3 views of operative knee.

## 2024-10-09 NOTE — PLAN OF CARE
Goal Outcome Evaluation:  Plan of Care Reviewed With: patient        Progress: improving  Outcome Evaluation: Patient demonstrated safe mobility on jain with walker. He is going home with family and will recieve outpatient PT. He has  a cane and walker at home.      Anticipated Discharge Disposition (PT): home with assist, home with outpatient therapy services

## 2024-10-09 NOTE — BRIEF OP NOTE
TOTAL KNEE ARTHROPLASTY WITH MARISELA ROBOT  Progress Note    Werner Brower  10/9/2024    Pre-op Diagnosis:   Primary osteoarthritis of left knee [M17.12]       Post-Op Diagnosis Codes:     * Primary osteoarthritis of left knee [M17.12]    Procedure/CPT® Codes:  KS ARTHRP KNE CONDYLE&PLATU MEDIAL&LAT COMPARTMENTS [08031]  KS CPTR-ASST SURGICAL NAVIGATION IMAGE-LESS [93545]      Procedure(s):  TOTAL KNEE ARTHROPLASTY WITH MARISELA ROBOT    Surgical Approach: Knee Medial Parapatellar            Surgeon(s):  Mathew Gill MD    Anesthesia: Spinal with Block    Staff:   Circulator: Dena Philippe, ADOLFO; Delmi Turcios, ADOLFO; Angy Celis RN  Scrub Person: Shira Bobby; Sophie Yi  Vendor Representative: Ba Diaz  Nursing Assistant: Amanda Saleh  Assistant: Melina Martínez PA-C  Assistant: Melina Martínez PA-C      Estimated Blood Loss:  50 mL    Urine Voided: * No values recorded between 10/9/2024 12:23 PM and 10/9/2024  1:48 PM *    Specimens:                None          Drains: * No LDAs found *    Findings: Advanced tricompartmental osteoarthritis with genu valgum alignment        Complications: None apparent    Assistant: Melina Martínez PA-C  was responsible for performing the following activities: Retraction, Suction, Irrigation, Suturing, Closing, and Placing Dressing and their skilled assistance was necessary for the success of this case.    Mathew Gill MD     Date: 10/9/2024  Time: 14:14 EDT

## 2024-10-09 NOTE — THERAPY DISCHARGE NOTE
Patient Name: Werner Brower  : 1945    MRN: [id number]                              Today's Date: 10/9/2024       Admit Date: 10/9/2024    Visit Dx:     ICD-10-CM ICD-9-CM   1. S/P TKR (total knee replacement), left  Z96.652 V43.65   2. Primary osteoarthritis of left knee  M17.12 715.16   3. Chronic diastolic (congestive) heart failure  I50.32 428.32     428.0     Patient Active Problem List   Diagnosis    BPH associated with nocturia    Essential hypertension    Primary osteoarthritis of both knees    Hyperlipidemia LDL goal <100    Pulmonary hypertension    Hyperglycemia    General medical exam    Elevated PSA    Chronic diastolic (congestive) heart failure    KIRSTIN (obstructive sleep apnea)    Class 1 obesity due to excess calories with serious comorbidity and body mass index (BMI) of 33.0 to 33.9 in adult    Arthritis of knee    S/P TKR (total knee replacement), left     Past Medical History:   Diagnosis Date    Allergic several years ago    Arrhythmia     Arthritis     Back pain     BPH associated with nocturia     Cervical disc disorder     Colon polyp several years ago    Condition not found     BROKEN ELBOW    Erectile dysfunction 3 years ago    Essential hypertension     Gallbladder problem     Gonarthrosis     BILATERAL    H/O colonoscopy     Heart murmur many years ago    High risk medication use     DRUG THERAPY FINDING    History of circumcision     HL (hearing loss) many years ago    Hyperlipidemia many years ago    Knee sprain     Migraine headache     Peptic ulceration     PONV (postoperative nausea and vomiting)     RLS (restless legs syndrome)     Rotator cuff syndrome     Skin problem     Sleep apnea     DOES NOT WEAR CPAP    Urinary tract infection several times 4 years ago    Visual impairment many years ago     Past Surgical History:   Procedure Laterality Date    BLEPHAROPLASTY      CHOLECYSTECTOMY      CIRCUMCISION      COLONOSCOPY  2010    SENSILE POLYPS 35 MC TUBULOVILLOUS  ADENOMA, EXT HEMMORHOIDS REPEAT 3 YEARS ([place])    ELBOW ARTHROSCOPY Left     JOINT REPLACEMENT  right knee 2017      General Information       [facility] Name 10/09/24 1735          Physical Therapy Time and Intention    Document Type evaluation  -SC     Mode of Treatment physical therapy  -[facility] Name 10/09/24 1735          General Information    Patient Profile Reviewed yes  -SC     Prior Level of Function independent:;gait;transfer  -SC     Existing Precautions/Restrictions fall  nerve cath  -SC     Barriers to Rehab none identified  -[facility] Name 10/09/24 1735          Living Environment    People in Home spouse  -[facility] Name 10/09/24 1735          Home Main Entrance    Number of Stairs, Main Entrance four  -SC     Stair Railings, Main Entrance railings safe and in good condition  -[facility] Name 10/09/24 1735          Stairs Within Home, Primary    Number of Stairs, Within Home, Primary none  -[facility] Name 10/09/24 1735          Cognition    Orientation Status (Cognition) oriented x 4  -[facility] Name 10/09/24 1735          Safety Issues, Functional Mobility    Impairments Affecting Function (Mobility) strength;pain;range of motion (ROM)  -SC     Comment, Safety Issues/Impairments (Mobility) alert, following commands  -SC               User Key  (r) = Recorded By, (t) = Taken By, (c) = Cosigned By      Initials Name Provider Type    SC Alejandra Silva, PT Physical Therapist                   Mobility       [facility] Name 10/09/24 1736          Bed Mobility    Bed Mobility scooting/bridging;supine-sit  -SC     Scooting/Bridging McKean (Bed Mobility) independent  -SC     Supine-Sit McKean (Bed Mobility) independent  -[facility] Name 10/09/24 1736          Transfers    Comment, (Transfers) cues for hand placement. Demonstrated good control of transfers  -[facility] Name 10/09/24 1736          Sit-Stand Transfer    Sit-Stand McKean (Transfers) modified independence;verbal  cues  -SC     Assistive Device (Sit-Stand Transfers) walker, front-wheeled  -SC       Row Name 10/09/24 1736          Gait/Stairs (Locomotion)    [place] Level (Gait) contact guard;1 person assist;1 person to manage equipment  -SC     Assistive Device (Gait) walker, front-wheeled  -SC     Patient was able to Ambulate yes  -SC     Distance in Feet (Gait) 350  -SC     Deviations/Abnormal Patterns (Gait) left sided deviations;antalgic;stride length decreased;weight shifting decreased  -SC     Assistive Device (Stairs) cane, straight  -SC     Handrail Location (Stairs) right side (ascending)  -SC     Number of Steps (Stairs) 5  -SC     Comment, (Gait/Stairs) Gt traininf focused on controling walker with step through gait pattern. Encouraged full wt shifting and upright posture. Demmonstrated good control,. NO LOB. Recieved cues for sequencing stairs with cane and rail on one side. Family present to learn how to guard hime. Demonstrated good technique  -SC       Row Name 10/09/24 1736          Mobility    Extremity Weight-bearing Status left lower extremity  -SC     Left Lower Extremity (Weight-bearing Status) weight-bearing as tolerated (WBAT)  -SC               User Key  (r) = Recorded By, (t) = Taken By, (c) = Cosigned By      Initials Name Provider Type    SC Alejandra Silva PT Physical Therapist                   Obj/Interventions       Row Name 10/09/24 1739          Range of Motion Comprehensive    Comment, General Range of Motion 0-90 deg R knee  -SC       Row Name 10/09/24 1739          Strength Comprehensive (MMT)    Comment, General Manual Muscle Testing (MMT) Assessment R LE quads 4/5, tib ant 5/5  -SC       Row Name 10/09/24 1739          Balance    Balance Assessment standing dynamic balance  -SC     Dynamic Standing Balance 1-person assist;1 person to manage equipment;contact guard  -SC     Position/Device Used, Standing Balance supported  -SC     Comment, Balance good control, no LOB  -SC       Row  Name 10/09/24 1739          Sensory Assessment (Somatosensory)    Sensory Assessment (Somatosensory) sensation intact  -SC               User Key  (r) = Recorded By, (t) = Taken By, (c) = Cosigned By      Initials Name Provider Type    SC Alejandra Silva PT Physical Therapist                   Goals/Plan    No documentation.                  Clinical Impression       [facility] Name 10/09/24 1740          Pain    Pretreatment Pain Rating 3/10  -SC     Posttreatment Pain Rating 3/10  -SC     Pain Location - Side/Orientation Left  -SC     Pain Location - knee  -SC     Pain Intervention(s) Repositioned;Cold applied  -SC       Row Name 10/09/24 1740          Plan of Care Review    Plan of Care Reviewed With patient  -SC     Progress improving  -SC     Outcome Evaluation Patient demonstrated safe mobility on jain with walker. He is going home with family and will recieve outpatient PT. He has  a cane and walker at home.  -[facility] Name 10/09/24 1740          Therapy Assessment/Plan (PT)    Patient/Family Therapy Goals Statement (PT) go home  -SC     Rehab Potential (PT) good, to achieve stated therapy goals  -SC     Criteria for Skilled Interventions Met (PT) yes;meets criteria  -SC     Therapy Frequency (PT) evaluation only  -SC     Predicted Duration of Therapy Intervention (PT) 1  -SC       Row Name 10/09/24 1740          Vital Signs    Pre Systolic BP Rehab 169  -SC     Pre Treatment Diastolic BP 80  -[facility] Name 10/09/24 1740          Positioning and Restraints    Pre-Treatment Position in bed  -SC     Post Treatment Position chair  -SC     In Chair notified nsg;reclined;sitting;encouraged to call for assist;call light within reach;exit alarm on;with family/caregiver  -SC               User Key  (r) = Recorded By, (t) = Taken By, (c) = Cosigned By      Initials Name Provider Type    SC Alejandra Silva, PT Physical Therapist                   Outcome Measures       Row Name 10/09/24 1742 10/09/24 1620       How  much help from another person do you currently need...    Turning from your back to your side while in flat bed without using bedrails? 4  -SC 3  -SJ    Moving from lying on back to sitting on the side of a flat bed without bedrails? 4  -SC 3  -SJ    Moving to and from a bed to a chair (including a wheelchair)? 3  -SC 3  -SJ    Standing up from a chair using your arms (e.g., wheelchair, bedside chair)? 3  -SC 3  -SJ    Climbing 3-5 steps with a railing? 3  -SC 3  -SJ    To walk in hospital room? 3  -SC 3  -SJ    AM-PAC 6 Clicks Score (PT) 20  -SC 18  -    Highest Level of Mobility Goal 6 --> Walk 10 steps or more  -SC 6 --> Walk 10 steps or more  -      Row Name 10/09/24 1742          PADD    Diagnosis 1  -SC     Gender 2  -SC     Age Group 0  -SC     Gait Distance 1  -SC     Assist Level 1  -SC     Home Support 3  -SC     PADD Score 8  -SC     Prediction by PADD Score directly home (with home health or out-patient rehab)  -SC       Row Name 10/09/24 1742          Functional Assessment    Outcome Measure Options AM-PAC 6 Clicks Basic Mobility (PT);PADD  -SC               User Key  (r) = Recorded By, (t) = Taken By, (c) = Cosigned By      Initials Name Provider Type    SC Alejandra Silva, PT Physical Therapist    Doretha Hathaway RN Registered Nurse                  Physical Therapy Education       Title: PT OT SLP Therapies (Done)       Topic: Physical Therapy (Done)       Point: Mobility training (Done)       Learning Progress Summary             Patient JOSE Roldan,TB,D,H, VU,DU by SC at 10/9/2024 1744    Comment: reviewed HEP   Family JOSE Roldan,TB,D,H, VU,DU by SC at 10/9/2024 1744    Comment: reviewed HEP                         Point: Home exercise program (Done)       Learning Progress Summary             Patient JOSE Roldan,TB,D,H, VU,DU by SC at 10/9/2024 1744    Comment: reviewed HEP   Family JOSE Roldan,TB,D,H, VU,DU by SC at 10/9/2024 1744    Comment: reviewed HEP                         Point: Body  mechanics (Done)       Learning Progress Summary             Patient Kenzieer, E,TB,D,H, VU,DU by SC at 10/9/2024 1744    Comment: reviewed HEP   Family Kenzieer, E,TB,D,H, VU,DU by SC at 10/9/2024 1744    Comment: reviewed HEP                         Point: Precautions (Done)       Learning Progress Summary             Patient Kenzieer, E,TB,D,H, VU,DU by SC at 10/9/2024 1744    Comment: reviewed HEP   Family Kenzieer, E,TB,D,H, VU,DU by SC at 10/9/2024 1744    Comment: reviewed HEP                                         User Key       Initials Effective Dates Name Provider Type [facility] 02/03/23 -  Alejandra Silva PT Physical Therapist PT                  PT Recommendation and Plan     Plan of Care Reviewed With: patient  Progress: improving  Outcome Evaluation: Patient demonstrated safe mobility on jain with walker. He is going home with family and will recieve outpatient PT. He has  a cane and walker at home.     Time Calculation:   PT Evaluation Complexity  History, PT Evaluation Complexity: 3 or more personal factors and/or comorbidities  Examination of Body Systems (PT Eval Complexity): total of 3 or more elements  Clinical Presentation (PT Evaluation Complexity): evolving  Clinical Decision Making (PT Evaluation Complexity): moderate complexity  Overall Complexity (PT Evaluation Complexity): moderate complexity     PT Charges       Row Name 10/09/24 1650             Time Calculation    Start Time 1650  -SC      PT Received On 10/09/24  -SC         Timed Charges    27428 - PT Therapeutic Exercise Minutes 10  -SC      38194 - Gait Training Minutes  15  -SC         Untimed Charges    PT Eval/Re-eval Minutes 25  -SC         Total Minutes    Timed Charges Total Minutes 25  -SC      Untimed Charges Total Minutes 25  -SC       Total Minutes 50  -SC                User Key  (r) = Recorded By, (t) = Taken By, (c) = Cosigned By      Initials Name Provider Type    SC Alejandra Silva, PT Physical Therapist                   Therapy Charges for Today       Code Description Service Date Service Provider Modifiers Qty    36187696960 HC PT THER PROC EA 15 MIN 10/9/2024 Shira, Alejandra GRIMM, PT GP 1    04094685207 HC GAIT TRAINING EA 15 MIN 10/9/2024 Shira, Alejandra GRIMM, PT GP 1    83229303351 HC PT EVAL MOD COMPLEXITY 2 10/9/2024 Shira, Alejandra GRIMM, PT GP 1    82262176890 HC PT THER SUPP EA 15 MIN 10/9/2024 Alejandra Silva, PT GP 3            PT G-Codes  Outcome Measure Options: AM-PAC 6 Clicks Basic Mobility (PT), PADD  AM-PAC 6 Clicks Score (PT): 20    PT Discharge Summary  Anticipated Discharge Disposition (PT): home with assist, home with outpatient therapy services    Alejandra Silva, PT  10/9/2024

## 2024-10-09 NOTE — ADDENDUM NOTE
Addendum  created 10/09/24 1554 by Justin Blanco CRNA    Clinical Note Signed, Diagnosis association updated, Intraprocedure Blocks edited

## 2024-10-09 NOTE — H&P
Patient Name: Werner Brower  MRN: [id number]  : 1945  DOS: 10/9/2024    Attending: Mathew Gill MD    Primary Care Provider: Mathew Olivarez MD      Chief complaint: Left knee Pain.    Subjective   Patient is a pleasant 79 y.o. male presented for scheduled surgery by Dr. Gill.    Patient has had progressive left knee pain related to advanced osteoarthritis.  This has failed conservative management, patient decided to proceed with knee replacement after discussing risk and benefit with Dr. Gill.    I saw him preoperatively and reviewed with him his past medical history and home medications.    Subsequent notes indicate he later underwent left total knee arthroplasty under spinal anesthesia and periarticular block, tolerated surgery well.  Adductor canal nerve block catheter was placed back pain service.  He was admitted for the management.    Patient is motivated to achieve goals for home discharge later today.  Allergies   Allergen Reactions    Ace Inhibitors Other (See Comments)     Other reaction(s): cough    Cefuroxime GI Intolerance    Nitrofurantoin Other (See Comments)     GI INTOLERANCE        Medications Prior to Admission   Medication Sig Dispense Refill Last Dose    ascorbic acid (QC Vitamin C with Loreta Hips) 500 MG tablet Take 1 tablet by mouth 2 (Two) Times a Day.       aspirin 81 MG EC tablet Take 1 tablet by mouth Daily.       carboxymethylcellulose (REFRESH PLUS) 0.5 % solution Daily As Needed for Dry Eyes.       carvedilol (COREG) 25 MG tablet Take 1 tablet by mouth 2 (Two) Times a Day.       Chlorhexidine Gluconate 4 % solution Shower daily with hibiclens solution as directed 5 days prior to surgery. 236 mL 0     Diclofenac Sodium (VOLTAREN) 1 % gel gel Apply 4 g topically to the appropriate area as directed 4 (Four) Times a Day As Needed.       losartan (COZAAR) 100 MG tablet Take 1 tablet by mouth Daily.       multivitamin with minerals (Multivitamin Men 50+) tablet  tablet Take 1 tablet by mouth Daily.       multivitamins-minerals (PRESERVISION AREDS 2) capsule capsule Daily.       rosuvastatin (CRESTOR) 10 MG tablet Take 1 tablet by mouth Daily. 90 tablet 2     tamsulosin (FLOMAX) 0.4 MG capsule 24 hr capsule Take 1 capsule by mouth Daily. 90 capsule 2          History:   Past Medical History:   Diagnosis Date    Allergic several years ago    Arrhythmia     Arthritis     Back pain     BPH associated with nocturia     Cervical disc disorder     Colon polyp several years ago    Condition not found     BROKEN ELBOW    Erectile dysfunction 3 years ago    Essential hypertension     Gallbladder problem     Gonarthrosis     BILATERAL    H/O colonoscopy     Heart murmur many years ago    High risk medication use     DRUG THERAPY FINDING    History of circumcision     HL (hearing loss) many years ago    Hyperlipidemia many years ago    Knee sprain     Migraine headache     Peptic ulceration     PONV (postoperative nausea and vomiting)     RLS (restless legs syndrome)     Rotator cuff syndrome     Skin problem     Sleep apnea     DOES NOT WEAR CPAP    Urinary tract infection several times 4 years ago    Visual impairment many years ago     Past Surgical History:   Procedure Laterality Date    BLEPHAROPLASTY      CHOLECYSTECTOMY      CIRCUMCISION      COLONOSCOPY  2010    SENSILE POLYPS 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, EXT HEMMORHOIDS REPEAT 3 YEARS ([place])    ELBOW ARTHROSCOPY Left     JOINT REPLACEMENT  right knee 2017     Family History   Problem Relation Age of Onset    Hearing loss Mother     Hearing loss Father      Social History     Tobacco Use    Smoking status: Former     Current packs/day: 0.00     Types: Cigarettes, Cigars     Quit date: 1975     Years since quittin.8    Smokeless tobacco: Never   Vaping Use    Vaping status: Never Used   Substance Use Topics    Alcohol use: Not Currently    Drug use: Never       Review of Systems  Pertinent items are  "noted in HPI    Vital Signs  /75 (BP Location: Right arm, Patient Position: Lying)   Pulse 78   Temp 97.8 °F (36.6 °C) (Oral)   Resp 18   Ht 182.9 cm (72.01\")   Wt 116 kg (255 lb 11.7 oz)   SpO2 96%   BMI 34.68 kg/m²     Physical Exam:    General Appearance:    Alert, cooperative, in no acute distress   Head:    Normocephalic, without obvious abnormality, atraumatic   Eyes:            Lids and lashes normal, conjunctivae and sclerae normal, no   icterus, no pallor, corneas clear    Ears:    Ears appear intact with no abnormalities noted   Throat:   No oral lesions, no thrush, oral mucosa moist   Neck:   No adenopathy, supple, trachea midline, no thyromegaly         Lungs:     Clear to auscultation,respirations regular, even and                   unlabored    Heart:    Regular rhythm and normal rate, normal S1 and S2, no       murmur, no gallop   Abdomen:     Normal bowel sounds, no masses, no organomegaly, soft        nontender, nondistended, no guarding, no rebound                 tenderness   Genitalia:    Deferred   Extremities: No clubbing, cyanosis, or edema when seen preop   Pulses:   Pulses palpable and equal bilaterally   Skin:   No bleeding, bruising or rash   Neurologic:   Cranial nerves 2 - 12 grossly intact, intact flexion and dorsiflexion bilateral feet      I reviewed the patient's new clinical results.             Invalid input(s): \"NEUTOPHILPCT\"  Results from last 7 days   Lab Units 10/09/24  1141   POTASSIUM mmol/L 4.1     Lab Results   Component Value Date    HGBA1C 5.90 (H) 09/25/2024      Latest Reference Range & Units 09/25/24 14:00   Sodium 136 - 145 mmol/L 139   Potassium 3.5 - 5.2 mmol/L 4.3   Chloride 98 - 107 mmol/L 102   CO2 22.0 - 29.0 mmol/L 26.0   Anion Gap 5.0 - 15.0 mmol/L 11.0   BUN 8 - 23 mg/dL 16   Creatinine 0.76 - 1.27 mg/dL 1.07   BUN/Creatinine Ratio 7.0 - 25.0  15.0   eGFR >60.0 mL/min/1.73 71.0   Glucose 65 - 99 mg/dL 135 (H)   Calcium 8.6 - 10.5 mg/dL 9.3 "   Alkaline Phosphatase 39 - 117 U/L 68   Total Protein 6.0 - 8.5 g/dL 7.8   Albumin 3.5 - 5.2 g/dL 3.9   Globulin gm/dL 3.9   A/G Ratio g/dL 1.0   AST (SGOT) 1 - 40 U/L 18   ALT (SGPT) 1 - 41 U/L 17   Total Bilirubin 0.0 - 1.2 mg/dL 0.4   (H): Data is abnormally high       Latest Reference Range & Units 09/25/24 14:00   WBC 3.40 - 10.80 10*3/mm3 8.03   RBC 4.14 - 5.80 10*6/mm3 3.78 (L)   Hemoglobin 13.0 - 17.7 g/dL 12.3 (L)   Hematocrit 37.5 - 51.0 % 36.2 (L)   Platelets 140 - 450 10*3/mm3 296   RDW 12.3 - 15.4 % 12.8   MCV 79.0 - 97.0 fL 95.8   MCH 26.6 - 33.0 pg 32.5   MCHC 31.5 - 35.7 g/dL 34.0   MPV 6.0 - 12.0 fL 8.0   RDW-SD 37.0 - 54.0 fl 45.1   (L): Data is abnormally low  Assessment and Plan:       S/P TKR (total knee replacement), left    Essential hypertension    Hyperlipidemia LDL goal <100    Pulmonary hypertension    Chronic diastolic (congestive) heart failure    KIRSTIN (obstructive sleep apnea)    Class 1 obesity due to excess calories with serious comorbidity and body mass index (BMI) of 33.0 to 33.9 in adult    Arthritis of knee      Plan  Postop management to include  1. PT/OT, protected weight bearing as tolerated left LE  2. Pain control-prns, ACB cath with ropivacaine infusion.  3. IS-encourage  4. DVT proph- Mechanicals and aspirin  5. Bowel regimen  6. Resume home medications as appropriate  7. DC planning for home      Patient is very motivated to work with physical therapy and achieve  mobility and pain control among other goals for possible discharge home later in the day.    We reviewed these goals and discussed with patient tracking  progress for the next few hours and if all is achieved to receive next antibiotic prophylactic dose and be discharged home.     We discussed medications and precriptions at time of discharge including DVT prophylaxis, pain control, and bowel regimen.  All questions were answered .    Patient expressed understanding and agreement.    Dragon disclaimer:  Part of  this encounter note is an electronic transcription/translation of spoken language to printed text. The electronic translation of spoken language may permit erroneous, or at times, nonsensical words or phrases to be inadvertently transcribed; Although I have reviewed the note for such errors, some may still exist.    Brian Le MD  10/09/24  16:59 EDT

## 2024-10-09 NOTE — ANESTHESIA PREPROCEDURE EVALUATION
Anesthesia Evaluation     Patient summary reviewed and Nursing notes reviewed   history of anesthetic complications:  PONV  NPO Solid Status: > 8 hours  NPO Liquid Status: > 2 hours           Airway   Mallampati: II  TM distance: >3 FB  Neck ROM: full  No difficulty expected  Dental          Pulmonary - normal exam    breath sounds clear to auscultation  (+) a smoker (Quit 1975) Former,sleep apnea (Intolerant of CPAP)  Cardiovascular - normal exam  Exercise tolerance: good (4-7 METS)    ECG reviewed  Rhythm: regular  Rate: normal    (+) hypertension, dysrhythmias (RBBB), hyperlipidemia    ROS comment: ECHO 09/2024: EF 55-60%, normal diastolic function, no gross valvular abnormalities, RVSP < 35 mmHg    Neuro/Psych  (+) headaches (Migraines)Tremors: RLS.  GI/Hepatic/Renal/Endo    (+) obesity, PUD    Musculoskeletal     (+) back pain, neck pain  Abdominal    Substance History - negative use     OB/GYN          Other   arthritis,     ROS/Med Hx Other: 09/25/24: Hgb 12.3, Plt 296, K 4.3, Cr 1.07                Anesthesia Plan    ASA 3     spinal and regional     intravenous induction     Anesthetic plan, risks, benefits, and alternatives have been provided, discussed and informed consent has been obtained with: patient.    Plan discussed with CRNA.    CODE STATUS:

## 2024-10-09 NOTE — ANESTHESIA PROCEDURE NOTES
Peripheral Block      Patient reassessed immediately prior to procedure    Start time: 10/9/2024 2:32 PM  Reason for block: at surgeon's request and post-op pain management  Performed by  CRNA/CAA: Justin Blanco CRNASRNA: Renetta Garcia SRNA  Preanesthetic Checklist  Completed: patient identified, IV checked, site marked, risks and benefits discussed, surgical consent, monitors and equipment checked, pre-op evaluation and timeout performed  Prep:  Pt Position: supine  Sterile barriers:cap, gloves, mask, sterile barriers and washed/disinfected hands  Prep: ChloraPrep  Patient monitoring: blood pressure monitoring, continuous pulse oximetry and EKG  Procedure  Performed under: spinal  Guidance:ultrasound guided    ULTRASOUND INTERPRETATION.  Using ultrasound guidance a 20 G gauge needle was placed in close proximity to the nerve, at which point, under ultrasound guidance anesthetic was injected in the area of the nerve and spread of the anesthesia was seen on ultrasound in close proximity thereto.  There were no abnormalities seen on ultrasound; a digital image was taken; and the patient tolerated the procedure with no complications. Images:still images obtained, printed/placed on chart    Laterality:left  Block Type:adductor canal block  Injection Technique:catheter  Needle Type:Tuohy and echogenic  Needle Gauge:18 G  Resistance on Injection: none  Catheter Size:20 G (20g)  Cath Depth at skin: 10 cm    Medications Used: bupivacaine PF (MARCAINE) 0.25 % injection - Injection   20 mL - 10/9/2024 2:32:00 PM      Post Assessment  Injection Assessment: negative aspiration for heme, incremental injection and no paresthesia on injection  Patient Tolerance:comfortable throughout block  Complications:no  Additional Notes  CATHETER   A high-frequency linear transducer, with sterile cover, was placed on the anterior mid-thigh (between the anterior superior iliac spine and patella). The transducer was then moved medially to  "identify the Sartorius muscle (Carol), Vastus Medialis muscle (VMM), Superficial Femoral Artery (SFA) and Vein. The transducer was then moved cephalad or caudad to position the SFA in the middle of the Carol. The insertion site was prepped and draped in sterile fashion. Skin and cutaneous tissue was infiltrated with 2-5 ml of 1% Lidocaine. Using ultrasound-guidance, an 18-gauge [employer] Ultra 360 Touhy needle was advanced in plane from lateral to medial. Preservative-free normal saline was utilized for hydro-dissection of tissue, advancement of Touhy, and to confirm needle placement below the fascial plane of the Carol where the Nerve to the VMM is located. Local anesthetic (LA) 5 ml deposited here. The Touhy needle continues its path lateral to the SFA at the level of the Saphenous Nerve. The remainder of the LA was deposited at the 10-11 o'clock position of the SFA. This injection created a space between the Carol and the SFA. Aspiration every 5 ml to prevent intravascular injection. Injection was completed with negative aspiration of blood and negative intravascular injection. Injection pressures were normal with minimal resistance. A 20-gauge [employer] Echo catheter was placed through the needle and advance out the tip of the Touhy 3-5 cm anterior to the SFA. The Touhy needle was then removed, and final catheter position verified at the 12 o'clock position to the SFA. The catheter was secured in the usual fashion with skin glue, benzoin, steri-strips, CHG tegaderm and label noting \"Nerve Block Catheter\". Jerk tape applied at yellow connector and catheter connection.   Performed by: Justin Blanco, CRNA            "

## 2024-10-11 ENCOUNTER — TELEPHONE (OUTPATIENT)
Dept: ORTHOPEDIC SURGERY | Facility: CLINIC | Age: 79
End: 2024-10-11
Payer: MEDICARE

## 2024-10-11 NOTE — TELEPHONE ENCOUNTER
I have called and talked with the patient. Patient stated his pain is getting better. His wife has been giving him pain medication, elevating his leg, and also putting ice on his leg.     Patient stated he does have swelling in the thigh, but he also still has the ace wrap on. Patient is going to take the ace wrap off, and continue elevating his leg, but will elevate a little higher. Patient also stated PT was going to do some pain therapy today.     Patient had no other concerns at this time, and was advised he would call the on-call physician this weekend if he thought things were not getting better.

## 2024-10-11 NOTE — TELEPHONE ENCOUNTER
Provider:  DR. DANIELLE FIELDS    Caller:  CHIDI CALVILLO    Relationship to Patient:  SELF    Pharmacy: [facility] [phone]    Phone Number:  [phone]    Reason for Call:  PATIENT HAD LEFT TKA ON 10/9/24. PATIENT SAYS DAY 1 POST OP WAS GOOD, BUT THEN WENT TO [facility] YESTERDAY AND HE THINKS THEY OVERDID HIM. PATIENT HAD A BAD NIGHT WITH INCREASED PAIN AND SAYS HIS LEFT THIGH IS SWOLLEN AND HE CAN HARDLY WALK. TRIED TO USE ROM TECH AND SAYS IT ADVISED HIM TO CALL MD DUE TO SWELLING. PATIENT SAYS PAIN LEVEL IS A LITTLE BETTER THIS MORNING AFTER RX. ALSO ASKING IF CAN REMOVE THE ACE BANDAGE BECAUSE IT HURTS.    When was the patient last seen:  10/9/24    Workflows for direction.

## 2024-10-15 ENCOUNTER — TELEPHONE (OUTPATIENT)
Dept: ORTHOPEDIC SURGERY | Facility: CLINIC | Age: 79
End: 2024-10-15
Payer: MEDICARE

## 2024-10-15 NOTE — TELEPHONE ENCOUNTER
1 WEEK POST-OP CALL      PATIENT INFORMATION:     Patient: Werner Brower       YOB: 1945         Age/Gender: 79 y.o. male     Medical Record Number: [id number]     Surgery:  Total Knee Arthroplasty with MARISELA Robot, Left                              Surgery Date: 10/9/24    Surgeon: Mathew Gill MD    Physical Therapy Location: [facility] in [place], [place]        How have you been doing since discharge? Very good! Patient is in great spirits and is very pleased with his recovery thusfar. He feels much more confident about this knee replacement compared to his previous surgery. He states he got a really good nights sleep last night and is more confident ambulating without his walker.     What is your current pain level 0-10? 1.5-2/10. Patient is not in a lot of pain. He is not taking any narcotics and uses Tylenol sparingly. His leg is still swollen and is most tender around his shin from the MARISELA robot.     How many PT sessions have you had so far? [facility] and is using his [employer] bike 2-3 times per day. He feels much better after using the bike.     (Dr. Gill patients only) Day 7 - You may take off the bandage. Day 10 you may shower if no drainage.        (Dr. Harding/Dr. Barrow patients only) Day 3 - After pain pump is removed, patient may shower. Wrap knee/incision in cling wrap or trash bag.     Questions/Concerns? Patient states his bandage does not appear to have any drainage. He stated he would contact us after he removes it tomorrow on day 7 if he notices any drainage, odor, heat, etc.,. Patient is overall in really great spirits!

## 2024-10-18 ENCOUNTER — LAB (OUTPATIENT)
Dept: FAMILY MEDICINE CLINIC | Facility: CLINIC | Age: 79
End: 2024-10-18
Payer: MEDICARE

## 2024-10-18 DIAGNOSIS — R73.9 HYPERGLYCEMIA: ICD-10-CM

## 2024-10-18 DIAGNOSIS — E78.5 HYPERLIPIDEMIA LDL GOAL <100: ICD-10-CM

## 2024-10-18 DIAGNOSIS — I10 ESSENTIAL HYPERTENSION: ICD-10-CM

## 2024-10-18 DIAGNOSIS — Z96.652 S/P TKR (TOTAL KNEE REPLACEMENT), LEFT: ICD-10-CM

## 2024-10-18 RX ORDER — OXYCODONE HYDROCHLORIDE 5 MG/1
5 TABLET ORAL EVERY 4 HOURS PRN
Qty: 30 TABLET | Refills: 0 | Status: SHIPPED | OUTPATIENT
Start: 2024-10-18

## 2024-10-18 NOTE — TELEPHONE ENCOUNTER
Caller: Werner Brower    Relationship: Self    Best call back number: [phone]    Requested Prescriptions:   Requested Prescriptions     Pending Prescriptions Disp Refills    oxyCODONE (Roxicodone) 5 MG immediate release tablet 30 tablet 0     Sig: Take 1 tablet by mouth Every 4 (Four) Hours As Needed for Moderate Pain.        Pharmacy where request should be sent: [facility]/PHARMACY #[id number] - [place], [place] - [address] [phone]  - [phone]      Last office visit with prescribing clinician: 8/29/2024   Last telemedicine visit with prescribing clinician: Visit date not found   Next office visit with prescribing clinician: Visit date not found     Additional details provided by patient: N/A    Does the patient have less than a 3 day supply:  [x] Yes  [] No    Would you like a call back once the refill request has been completed: [x] Yes [] No    If the office needs to give you a call back, can they leave a voicemail: [x] Yes [] No    Driss Carbajal Rep   10/18/24 08:32 EDT

## 2024-10-19 LAB
ALBUMIN SERPL-MCNC: 3.7 G/DL (ref 3.8–4.8)
ALP SERPL-CCNC: 84 IU/L (ref 44–121)
ALT SERPL-CCNC: 36 IU/L (ref 0–44)
AST SERPL-CCNC: 23 IU/L (ref 0–40)
BASOPHILS # BLD AUTO: 0.1 X10E3/UL (ref 0–0.2)
BASOPHILS NFR BLD AUTO: 1 %
BILIRUB SERPL-MCNC: 0.5 MG/DL (ref 0–1.2)
BUN SERPL-MCNC: 18 MG/DL (ref 8–27)
BUN/CREAT SERPL: 18 (ref 10–24)
CALCIUM SERPL-MCNC: 8.8 MG/DL (ref 8.6–10.2)
CHLORIDE SERPL-SCNC: 97 MMOL/L (ref 96–106)
CHOLEST SERPL-MCNC: 90 MG/DL (ref 100–199)
CO2 SERPL-SCNC: 24 MMOL/L (ref 20–29)
CREAT SERPL-MCNC: 1.01 MG/DL (ref 0.76–1.27)
EGFRCR SERPLBLD CKD-EPI 2021: 76 ML/MIN/1.73
EOSINOPHIL # BLD AUTO: 1.3 X10E3/UL (ref 0–0.4)
EOSINOPHIL NFR BLD AUTO: 12 %
ERYTHROCYTE [DISTWIDTH] IN BLOOD BY AUTOMATED COUNT: 12.8 % (ref 11.6–15.4)
GLOBULIN SER CALC-MCNC: 3.5 G/DL (ref 1.5–4.5)
GLUCOSE SERPL-MCNC: 104 MG/DL (ref 70–99)
HBA1C MFR BLD: 6.2 % (ref 4.8–5.6)
HCT VFR BLD AUTO: 33.6 % (ref 37.5–51)
HDLC SERPL-MCNC: 30 MG/DL
HGB BLD-MCNC: 11.1 G/DL (ref 13–17.7)
IMM GRANULOCYTES # BLD AUTO: 0.1 X10E3/UL (ref 0–0.1)
IMM GRANULOCYTES NFR BLD AUTO: 1 %
LDLC SERPL CALC-MCNC: 36 MG/DL (ref 0–99)
LYMPHOCYTES # BLD AUTO: 2.8 X10E3/UL (ref 0.7–3.1)
LYMPHOCYTES NFR BLD AUTO: 25 %
MCH RBC QN AUTO: 32.2 PG (ref 26.6–33)
MCHC RBC AUTO-ENTMCNC: 33 G/DL (ref 31.5–35.7)
MCV RBC AUTO: 97 FL (ref 79–97)
MONOCYTES # BLD AUTO: 1 X10E3/UL (ref 0.1–0.9)
MONOCYTES NFR BLD AUTO: 9 %
NEUTROPHILS # BLD AUTO: 5.8 X10E3/UL (ref 1.4–7)
NEUTROPHILS NFR BLD AUTO: 52 %
PLATELET # BLD AUTO: 450 X10E3/UL (ref 150–450)
POTASSIUM SERPL-SCNC: 4.9 MMOL/L (ref 3.5–5.2)
PROT SERPL-MCNC: 7.2 G/DL (ref 6–8.5)
RBC # BLD AUTO: 3.45 X10E6/UL (ref 4.14–5.8)
SODIUM SERPL-SCNC: 134 MMOL/L (ref 134–144)
T4 FREE SERPL-MCNC: 1.41 NG/DL (ref 0.82–1.77)
TRIGL SERPL-MCNC: 133 MG/DL (ref 0–149)
TSH SERPL DL<=0.005 MIU/L-ACNC: 4.11 UIU/ML (ref 0.45–4.5)
VLDLC SERPL CALC-MCNC: 24 MG/DL (ref 5–40)
WBC # BLD AUTO: 11.1 X10E3/UL (ref 3.4–10.8)

## 2024-10-21 ENCOUNTER — TELEPHONE (OUTPATIENT)
Dept: ORTHOPEDIC SURGERY | Facility: CLINIC | Age: 79
End: 2024-10-21
Payer: MEDICARE

## 2024-10-21 NOTE — TELEPHONE ENCOUNTER
Pt just said he wanted to know if he had to leave the sleeve on and he said he already had the bandage off and has already taken showers. I let him know he can remove the sleeve as long as he is comfortable with it and not swollen. I told him to let us know if he needed anything else. Pt verbalized understanding.    Kristine Castro CMA ([facility])

## 2024-10-21 NOTE — TELEPHONE ENCOUNTER
Provider: DONNIE    Caller: CHIDI CALVILLO    Relationship to Patient: PATIENT    Pharmacy: NA    Phone Number: [phone]    Reason for Call: PATIENT HAS QUESTIONS ABOUT BANDAGE CHANGING

## 2024-10-23 ENCOUNTER — OFFICE VISIT (OUTPATIENT)
Dept: FAMILY MEDICINE CLINIC | Facility: CLINIC | Age: 79
End: 2024-10-23
Payer: MEDICARE

## 2024-10-23 VITALS
BODY MASS INDEX: 32.87 KG/M2 | SYSTOLIC BLOOD PRESSURE: 120 MMHG | OXYGEN SATURATION: 96 % | DIASTOLIC BLOOD PRESSURE: 60 MMHG | WEIGHT: 248 LBS | HEIGHT: 73 IN | HEART RATE: 61 BPM

## 2024-10-23 DIAGNOSIS — I10 ESSENTIAL HYPERTENSION: Primary | Chronic | ICD-10-CM

## 2024-10-23 DIAGNOSIS — E66.09 CLASS 1 OBESITY DUE TO EXCESS CALORIES WITH SERIOUS COMORBIDITY AND BODY MASS INDEX (BMI) OF 32.0 TO 32.9 IN ADULT: ICD-10-CM

## 2024-10-23 DIAGNOSIS — Z96.652 STATUS POST TOTAL LEFT KNEE REPLACEMENT: ICD-10-CM

## 2024-10-23 DIAGNOSIS — G47.33 OSA (OBSTRUCTIVE SLEEP APNEA): Chronic | ICD-10-CM

## 2024-10-23 DIAGNOSIS — R35.1 BPH ASSOCIATED WITH NOCTURIA: Chronic | ICD-10-CM

## 2024-10-23 DIAGNOSIS — E66.811 CLASS 1 OBESITY DUE TO EXCESS CALORIES WITH SERIOUS COMORBIDITY AND BODY MASS INDEX (BMI) OF 32.0 TO 32.9 IN ADULT: ICD-10-CM

## 2024-10-23 DIAGNOSIS — N40.1 BPH ASSOCIATED WITH NOCTURIA: Chronic | ICD-10-CM

## 2024-10-23 DIAGNOSIS — E78.5 HYPERLIPIDEMIA LDL GOAL <100: Chronic | ICD-10-CM

## 2024-10-23 DIAGNOSIS — R97.20 ELEVATED PSA: Chronic | ICD-10-CM

## 2024-10-23 DIAGNOSIS — M17.0 PRIMARY OSTEOARTHRITIS OF BOTH KNEES: Chronic | ICD-10-CM

## 2024-10-23 DIAGNOSIS — R73.9 HYPERGLYCEMIA: Chronic | ICD-10-CM

## 2024-10-23 DIAGNOSIS — I50.32 CHRONIC DIASTOLIC (CONGESTIVE) HEART FAILURE: Chronic | ICD-10-CM

## 2024-10-23 DIAGNOSIS — R82.90 ABNORMAL URINE FINDINGS: ICD-10-CM

## 2024-10-23 LAB
BILIRUB BLD-MCNC: NEGATIVE MG/DL
CLARITY, POC: CLEAR
COLOR UR: YELLOW
EXPIRATION DATE: NORMAL
GLUCOSE UR STRIP-MCNC: NEGATIVE MG/DL
KETONES UR QL: NEGATIVE
LEUKOCYTE EST, POC: NEGATIVE
Lab: NORMAL
NITRITE UR-MCNC: NEGATIVE MG/ML
PH UR: 5.5 [PH] (ref 5–8)
PROT UR STRIP-MCNC: NEGATIVE MG/DL
RBC # UR STRIP: NEGATIVE /UL
SP GR UR: 1.01 (ref 1–1.03)
UROBILINOGEN UR QL: NORMAL

## 2024-10-23 PROCEDURE — 1159F MED LIST DOCD IN RCRD: CPT | Performed by: FAMILY MEDICINE

## 2024-10-23 PROCEDURE — G2211 COMPLEX E/M VISIT ADD ON: HCPCS | Performed by: FAMILY MEDICINE

## 2024-10-23 PROCEDURE — 1160F RVW MEDS BY RX/DR IN RCRD: CPT | Performed by: FAMILY MEDICINE

## 2024-10-23 PROCEDURE — 81003 URINALYSIS AUTO W/O SCOPE: CPT | Performed by: FAMILY MEDICINE

## 2024-10-23 PROCEDURE — 99214 OFFICE O/P EST MOD 30 MIN: CPT | Performed by: FAMILY MEDICINE

## 2024-10-23 PROCEDURE — 1125F AMNT PAIN NOTED PAIN PRSNT: CPT | Performed by: FAMILY MEDICINE

## 2024-10-23 PROCEDURE — 3078F DIAST BP <80 MM HG: CPT | Performed by: FAMILY MEDICINE

## 2024-10-23 PROCEDURE — 3074F SYST BP LT 130 MM HG: CPT | Performed by: FAMILY MEDICINE

## 2024-10-23 RX ORDER — ROSUVASTATIN CALCIUM 10 MG/1
10 TABLET, COATED ORAL DAILY
Qty: 90 TABLET | Refills: 2 | Status: SHIPPED | OUTPATIENT
Start: 2024-10-23

## 2024-10-23 RX ORDER — TAMSULOSIN HYDROCHLORIDE 0.4 MG/1
1 CAPSULE ORAL DAILY
Qty: 90 CAPSULE | Refills: 2 | Status: SHIPPED | OUTPATIENT
Start: 2024-10-23

## 2024-10-23 RX ORDER — ACETAMINOPHEN 500 MG
1000 TABLET ORAL EVERY 6 HOURS PRN
COMMUNITY

## 2024-10-23 RX ORDER — LOSARTAN POTASSIUM 100 MG/1
100 TABLET ORAL DAILY
Start: 2024-10-23

## 2024-10-23 NOTE — ASSESSMENT & PLAN NOTE
Ongoing follow-up with urology.      Repeat UA clear after episode of trace leuks    No symptoms    Reassured no evid for infection

## 2024-10-23 NOTE — ASSESSMENT & PLAN NOTE
Patient's (Body mass index is 32.72 kg/m².) indicates that they are obese (BMI >30) with health conditions that include hypertension, dyslipidemias, and osteoarthritis . Weight is improving with lifestyle modifications. BMI  is above average; BMI management plan is completed. We discussed portion control and increasing exercise.

## 2024-10-23 NOTE — PROGRESS NOTES
"Chief Complaint  Hypertension and Hyperlipidemia    Subjective    History of Present Illness:  Werner Brower is a 79 y.o. male who presents today for Hyperglycemia/prediabetes, HTN, Hyperlipidemia, BPH/Elevated PSA, and followup after recent L TKA 2 wks ago    Subjective    History of Present Illness:  Werner Brower is a 78 y.o. male who presents today for followup visit and medication refills     Doing well after our visit together.    L TKA 2 weeks ago and has done very well.  Continues with PT    Labs before his visit with A1c up to 6.2 but has not been abl mobile with knee pain worsening.  It was down below 6 in April.     He did have an episode of volume overload with heart failure that resulted from his volume overload but his ejection fraction returned to normal after a solution of his volume overload episode.  His BNP returned normal after resolution of his acute volume overload.  He is following with Dr. Schmitt regarding his chronic diastolic heart failure.    KIRSTIN workup did show sleep apnea  - discussed again today - he declines treatment and does understand this can contribute to worsening pulm HTN and diastolic CHF.      He continues on carvedilol, losartan, low-dose aspirin, Crestor, and was taken off Vascepa by Cardiology.      He is following with urology given past PSA elevation at 4.7.  He is now on Flomax for BPH symptoms and he does continues with PSA monitoring with Urology (Dr Fried).       Objective   Vital Signs:   /60   Pulse 61   Ht 185.4 cm (73\")   Wt 112 kg (248 lb)   SpO2 96%   BMI 32.72 kg/m²     Review of Systems   Constitutional:  Negative for appetite change, chills and fever.   HENT:  Negative for hearing loss.    Eyes:  Negative for blurred vision.   Respiratory:  Negative for chest tightness.    Cardiovascular:  Negative for chest pain.   Gastrointestinal:  Negative for abdominal pain.   Musculoskeletal:  Positive for gait problem.        L Knee after TKA doing " well, expected bruising and swelling - which is improving.  No evid for infection   Skin:  Negative for rash.   Psychiatric/Behavioral:  Negative for depressed mood.        Past History:  Medical History: has a past medical history of Allergic (several years ago), Arrhythmia, Arthritis, Back pain, BPH associated with nocturia, Cervical disc disorder, Colon polyp (several years ago), Condition not found, Erectile dysfunction (3 years ago), Essential hypertension, Gallbladder problem, Gonarthrosis, H/O colonoscopy, Heart murmur (many years ago), High risk medication use, History of circumcision, HL (hearing loss) (many years ago), Hyperlipidemia (many years ago), Knee sprain, Migraine headache, Peptic ulceration, PONV (postoperative nausea and vomiting), RLS (restless legs syndrome), Rotator cuff syndrome, Skin problem, Sleep apnea, Urinary tract infection (several times 4 years ago), and Visual impairment (many years ago).   Surgical History: has a past surgical history that includes Cholecystectomy; Colonoscopy (2010); Elbow arthroscopy (Left); Circumcision, non-; Joint replacement (right knee ); Blepharoplasty; and Total knee arthroplasty (Left, 10/9/2024).   Family History: family history includes Hearing loss in his father and mother.   Social History: reports that he quit smoking about 49 years ago. His smoking use included cigars. He started smoking about 61 years ago. He has never used smokeless tobacco. He reports that he does not currently use alcohol. He reports that he does not use drugs.      Current Outpatient Medications:     acetaminophen (TYLENOL) 500 MG tablet, Take 2 tablets by mouth Every 6 (Six) Hours As Needed for Mild Pain., Disp: , Rfl:     ascorbic acid (QC Vitamin C with Loreta Hips) 500 MG tablet, Take 1 tablet by mouth 2 (Two) Times a Day., Disp: , Rfl:     aspirin (ASPIR) 81 MG EC tablet, Take 1 tablet by mouth 2 (Two) Times a Day., Disp: 60 tablet, Rfl: 0    [START ON  11/9/2024] aspirin 81 MG EC tablet, Take 1 tablet by mouth Daily., Disp: , Rfl:     carboxymethylcellulose (REFRESH PLUS) 0.5 % solution, Daily As Needed for Dry Eyes., Disp: , Rfl:     carvedilol (COREG) 25 MG tablet, Take 1 tablet by mouth 2 (Two) Times a Day., Disp: , Rfl:     Diclofenac Sodium (VOLTAREN) 1 % gel gel, Apply 4 g topically to the appropriate area as directed 4 (Four) Times a Day As Needed., Disp: , Rfl:     docusate sodium (COLACE) 100 MG capsule, Take 1 capsule by mouth 2 (Two) Times a Day for 15 days., Disp: 30 capsule, Rfl: 0    losartan (COZAAR) 100 MG tablet, Take 1 tablet by mouth Daily., Disp: , Rfl:     meloxicam (MOBIC) 15 MG tablet, Take 1 tablet by mouth Daily for 15 days., Disp: 15 tablet, Rfl: 0    multivitamin with minerals (Multivitamin Men 50+) tablet tablet, Take 1 tablet by mouth Daily., Disp: , Rfl:     multivitamins-minerals (PRESERVISION AREDS 2) capsule capsule, Daily., Disp: , Rfl:     ondansetron (Zofran) 4 MG tablet, Take 1 tablet by mouth Every 8 (Eight) Hours As Needed for Nausea or Vomiting for up to 20 doses., Disp: 20 tablet, Rfl: 0    oxyCODONE (Roxicodone) 5 MG immediate release tablet, Take 1 tablet by mouth Every 4 (Four) Hours As Needed for Moderate Pain., Disp: 30 tablet, Rfl: 0    ropivacaine (NAROPIN) 0.2 % infusion (INFUSYSTEM), 2 mg/hr by Peripheral Nerve route Continuous., Disp: , Rfl:     rosuvastatin (CRESTOR) 10 MG tablet, Take 1 tablet by mouth Daily., Disp: 90 tablet, Rfl: 2    tamsulosin (FLOMAX) 0.4 MG capsule 24 hr capsule, Take 1 capsule by mouth Daily., Disp: 90 capsule, Rfl: 2    Allergies: Ace inhibitors, Cefuroxime, and Nitrofurantoin    Physical Exam  Constitutional:       Appearance: He is obese.   HENT:      Head: Normocephalic.      Right Ear: External ear normal.      Left Ear: External ear normal.      Nose: Nose normal.   Eyes:      Pupils: Pupils are equal, round, and reactive to light.   Cardiovascular:      Rate and Rhythm: Normal rate  and regular rhythm.      Heart sounds: Normal heart sounds.   Pulmonary:      Effort: Pulmonary effort is normal.      Breath sounds: Normal breath sounds.   Musculoskeletal:      Cervical back: Normal range of motion.      Comments: Doing well after L TKA - using walker if needed, ongoing PT postop    Neurological:      General: No focal deficit present.      Mental Status: He is alert.   Psychiatric:         Mood and Affect: Mood normal.         Behavior: Behavior normal.         Thought Content: Thought content normal.          Result Review                   Assessment and Plan  Diagnoses and all orders for this visit:    1. Essential hypertension (Primary)  Assessment & Plan:  Hypertension is stable and controlled  Continue current treatment regimen.  Weight loss.  Regular aerobic exercise.  Blood pressure will be reassessed  4 mos .    Orders:  -     CBC & Differential; Future  -     Comprehensive Metabolic Panel; Future  -     Lipid Panel; Future  -     losartan (COZAAR) 100 MG tablet; Take 1 tablet by mouth Daily.    2. Hyperlipidemia LDL goal <100  Assessment & Plan:   Lipid abnormalities are improving with treatment    Plan:  Continue same medication/s without change.      Discussed medication dosage, use, side effects, and goals of treatment in detail.    Counseled patient on lifestyle modifications to help control hyperlipidemia.     Patient Treatment Goals:   LDL goal is under 100    Followup at the next regular appointment.    Orders:  -     CBC & Differential; Future  -     Comprehensive Metabolic Panel; Future  -     Lipid Panel; Future  -     rosuvastatin (CRESTOR) 10 MG tablet; Take 1 tablet by mouth Daily.  Dispense: 90 tablet; Refill: 2    3. Hyperglycemia  Assessment & Plan:  Restarting exercise as able after TKA    Orders:  -     Hemoglobin A1c; Future    4. Abnormal urine findings  -     POCT urinalysis dipstick, automated    5. Elevated PSA  Assessment & Plan:  Ongoing follow-up with urology.       Repeat UA clear after episode of trace leuks    No symptoms    Reassured no evid for infection      6. Primary osteoarthritis of both knees  Assessment & Plan:  Doing well after L TKA      7. KIRSTIN (obstructive sleep apnea)  Assessment & Plan:  Encouraged KIRSTIN tx - pt declines    Discussed CV risks with untreated KIRSTIN      8. Chronic diastolic (congestive) heart failure  Assessment & Plan:  Congestive heart failure due to hypertension.  Heart failure is improving with treatment.  NYHA Class I.  Continue current treatment regimen.  Heart failure will be reassessed in 6 months.    Orders:  -     rosuvastatin (CRESTOR) 10 MG tablet; Take 1 tablet by mouth Daily.  Dispense: 90 tablet; Refill: 2  -     losartan (COZAAR) 100 MG tablet; Take 1 tablet by mouth Daily.    9. BPH associated with nocturia  Assessment & Plan:  Continues Flomax     Orders:  -     tamsulosin (FLOMAX) 0.4 MG capsule 24 hr capsule; Take 1 capsule by mouth Daily.  Dispense: 90 capsule; Refill: 2    10. Status post total left knee replacement  Assessment & Plan:  Doing well postop, no evid for infection    Repeat UA clear today (needed for eye injections at the [facility] )       11. Class 1 obesity due to excess calories with serious comorbidity and body mass index (BMI) of 32.0 to 32.9 in adult  Assessment & Plan:  Patient's (Body mass index is 32.72 kg/m².) indicates that they are obese (BMI >30) with health conditions that include hypertension, dyslipidemias, and osteoarthritis . Weight is improving with lifestyle modifications. BMI  is above average; BMI management plan is completed. We discussed portion control and increasing exercise.                      Follow Up  Return in 4 months (on 2/24/2025) for Medicare Wellness, Fasting labs 1 week before apt (Drink water).    Mathew Olivarez MD

## 2024-10-23 NOTE — ASSESSMENT & PLAN NOTE
Doing well postop, no evid for infection    Repeat UA clear today (needed for eye injections at the [facility] )

## 2024-10-30 DIAGNOSIS — Z96.652 S/P TKR (TOTAL KNEE REPLACEMENT), LEFT: ICD-10-CM

## 2024-10-30 RX ORDER — OXYCODONE HYDROCHLORIDE 5 MG/1
5 TABLET ORAL EVERY 4 HOURS PRN
Qty: 30 TABLET | Refills: 0 | Status: SHIPPED | OUTPATIENT
Start: 2024-10-30

## 2024-10-30 NOTE — TELEPHONE ENCOUNTER
Patient called stating he is needing a refill of his medication oxyCODONE 5 MG     Please advise, thank you

## 2024-10-30 NOTE — TELEPHONE ENCOUNTER
Patient had a TOTAL KNEE ARTHROPLASTY WITH MARISELA ROBOT LEFT on 10/09/2024 and is requesting a refill of Oxycodone. Please advise, thank you.    Kristine Castro CMA ([facility])

## 2024-10-31 ENCOUNTER — OFFICE VISIT (OUTPATIENT)
Dept: ORTHOPEDIC SURGERY | Facility: CLINIC | Age: 79
End: 2024-10-31
Payer: MEDICARE

## 2024-10-31 VITALS — TEMPERATURE: 97.1 F

## 2024-10-31 DIAGNOSIS — Z96.652 S/P TOTAL KNEE ARTHROPLASTY, LEFT: Primary | ICD-10-CM

## 2024-10-31 NOTE — PROGRESS NOTES
[facility] Note        Subjective     Post-op (3 weeks s/p left total knee arthroplasty 10/9/24)       HPI    Werner Brower is a 79 y.o. male.  Patient presents for their initial postop visit following left TKA with Raheem robot performed on the above date by Dr. Gill.    Pain scale: 2/10. Associated symptoms pain, swelling. Pain with prolonged standing, sleeping. Pain eased by resting, pain medication. Using a walking stick to assist with ambulation. Attending OPPT.    Patient denies any fever, chills, night sweats or other constitutional symptoms.          Objective      Physical Exam  Temp 97.1 °F (36.2 °C)     There is no height or weight on file to calculate BMI.        Ortho Exam  Integument:   Left knee: Incision is healing well without redness, warmth, drainage or signs of infection.    Lower Extremities:   Left knee:    Tenderness:  Generalized pain typical following TKA    Effusion:  1+    Swelling: Positive with soft calf    Crepitus:  None    Range of motion:  Extension: 0°       Flexion: 112°  Instability:  No varus laxity, no valgus laxity, negative anterior drawer  Deformities:  None      Imaging Reviewed and Interpreted:  Ordered left knee plain films.  Imaging read/interpreted by Dr. Gill.    Imaging Results (Last 24 Hours)       Procedure Component Value Units Date/Time    XR Knee 3+ View With [place] Left [[id number]] Resulted: 10/31/24 1325     Updated: 10/31/24 1325    Narrative:      Indication: Status post left total knee arthroplasty    Comparison: Todays xrays were compared to previous xrays from 10/9/2024      Impression:           Left Knee: Demonstrate well positioned knee arthroplasty components in   satisfactory alignment without evidence of wear, loosening, subsidence,   fracture, or osteolysis and No significant changes compared to prior   radiographs.              Assessment:  1. S/P total knee arthroplasty, left        Plan:  Status post left TKA with  Raheem robot, emely.  Reviewed imaging with patient.  Continue with outpatient PT.  Patient received a refill of his pain medication yesterday.  He was instructed to space out the pain medication is much as possible and continue taking the Tylenol as directed.  Continue with ASA as directed for DVT prophylaxis.  No dental procedures until minimum of 3 months out from surgery.  Patient will require indefinite antibiotic prophylaxis before dental procedures.  Follow up with Dr. Gill in 3 weeks for repeat evaluation. Needs knee imaging.  ROM goal by their next appointment 0-120 degrees.  Questions and concerns answered.    Answers submitted by the patient for this visit:  Other (Submitted on 10/24/2024)  Please describe your symptoms.: surgery follow up  Have you had these symptoms before?: Yes  How long have you been having these symptoms?: Greater than 2 weeks  Please describe any probable cause for these symptoms. : operation on knee  Primary Reason for Visit (Submitted on 10/24/2024)  What is the primary reason for your visit?: Problem Not Listed      Melina Martínez PA-C  10/31/24  13:33 EDT      Dictated Utilizing Dragon Dictation.

## 2024-11-12 ENCOUNTER — TELEPHONE (OUTPATIENT)
Dept: ORTHOPEDIC SURGERY | Facility: CLINIC | Age: 79
End: 2024-11-12
Payer: MEDICARE

## 2024-11-12 DIAGNOSIS — Z96.652 S/P TKR (TOTAL KNEE REPLACEMENT), LEFT: ICD-10-CM

## 2024-11-12 RX ORDER — OXYCODONE HYDROCHLORIDE 5 MG/1
5 TABLET ORAL EVERY 6 HOURS PRN
Qty: 30 TABLET | Refills: 0 | Status: SHIPPED | OUTPATIENT
Start: 2024-11-12 | End: 2024-11-18

## 2024-11-12 NOTE — TELEPHONE ENCOUNTER
Caller: Werner Brower    Relationship: Self    Best call back number: [phone]    Requested Prescriptions: oxyCODONE (Roxicodone) 5 MG immediate release tablet   Requested Prescriptions      No prescriptions requested or ordered in this encounter        Pharmacy where request should be sent:  [facility]    Last office visit with prescribing clinician: 8/29/2024   Last telemedicine visit with prescribing clinician: Visit date not found   Next office visit with prescribing clinician: Visit date not found     Additional details provided by patient:     Does the patient have less than a 3 day supply:  [x] Yes  [] No    Would you like a call back once the refill request has been completed: [] Yes [] No    If the office needs to give you a call back, can they leave a voicemail: [] Yes [] No    Driss Epstein Rep   11/12/24 11:46 EST

## 2024-11-12 NOTE — TELEPHONE ENCOUNTER
Will you refill? Surgery 10/09/2024. Last refill 10/30/2024. Pharmacy on file is correct.       Ksenia

## 2024-11-18 ENCOUNTER — OFFICE VISIT (OUTPATIENT)
Dept: ORTHOPEDIC SURGERY | Facility: CLINIC | Age: 79
End: 2024-11-18
Payer: MEDICARE

## 2024-11-18 DIAGNOSIS — Z96.652 S/P TOTAL KNEE ARTHROPLASTY, LEFT: Primary | ICD-10-CM

## 2024-11-18 PROCEDURE — 1159F MED LIST DOCD IN RCRD: CPT | Performed by: PHYSICIAN ASSISTANT

## 2024-11-18 PROCEDURE — 99024 POSTOP FOLLOW-UP VISIT: CPT | Performed by: PHYSICIAN ASSISTANT

## 2024-11-18 PROCEDURE — 1160F RVW MEDS BY RX/DR IN RCRD: CPT | Performed by: PHYSICIAN ASSISTANT

## 2024-11-18 RX ORDER — OXYCODONE HYDROCHLORIDE 5 MG/1
5 TABLET ORAL EVERY 12 HOURS PRN
Qty: 30 TABLET | Refills: 0 | Status: SHIPPED | OUTPATIENT
Start: 2024-11-18

## 2024-11-18 NOTE — PROGRESS NOTES
[facility] [place] Orthopaedic Surgery Clinic Note        Subjective     Follow-up (2 week follow-up: 6 weeks S/P Left Total Knee Arthroplasty (10/9/24))       MALGORZATA Brower is a 79 y.o. male.  Patient returns today now 6 weeks out following left TKA with Raheem robot performed on the above date by Dr. Gill.    Pain scale: 3-4/10.  Associated symptoms pain, stiffness, swelling.  Pain is worse with walking, stairs, sleeping, rising from a seated position but he does note that his symptoms have improved since his last visit.  He is using a walking stick to assist with ambulation as needed.  He is continuing to take oxycodone 3 times a day in addition to Tylenol 3 times a day.    No reported fever, chills, night sweats or other constitutional symptoms.        Objective      Physical Exam  There were no vitals taken for this visit.    There is no height or weight on file to calculate BMI.        Ortho Exam  Integument:   Left knee: Incision is well-healed without redness, warmth, drainage or signs of infection.    Lower Extremities:   Left knee:    Tenderness:  Continues with some generalized discomfort to the knee    Effusion:  Mild    Swelling: Positive with soft calf    Crepitus:  None    Range of motion:  Extension: 0°       Flexion: 115/120°  Instability:  No varus laxity, no valgus laxity, negative anterior drawer  Deformities:  None    Positive numbness noted lateral aspect of the incision.  He has similar findings on the right knee.      Imaging Reviewed and Interpreted:  Ordered left knee plain films.  Imaging read/interpreted by Dr. Gill.    Imaging Results (Last 24 Hours)       Procedure Component Value Units Date/Time    XR Knee 3+ View With [place] Left [[id number]] Resulted: 11/18/24 1430     Updated: 11/18/24 1430    Narrative:      Indication: Status post left total knee arthroplasty    Comparison: Todays xrays were compared to previous xrays from 10/31/2024      Impression:           Left Knee:  Demonstrate well positioned knee arthroplasty components in   satisfactory alignment without evidence of wear, loosening, subsidence,   fracture, or osteolysis and No significant changes compared to prior   radiographs.              Assessment:  1. S/P total knee arthroplasty, left        Plan:  Status post left TKA with Raheem robot, stable.  Reviewed imaging with patient.  Continue with outpatient PT.  Requested refill of narcotic pain medication, provided by Dr. Gill.  Patient was informed this will be his last refill.  Recommend OTC NSAIDS/pain medication as needed.  Continue with ASA as directed.  He is back to his normal dose  No dental procedures until minimum of 3 months out from surgery.  Patient will require indefinite antibiotic prophylaxis before dental procedures.  Follow up with Dr. Gill in 2 months for repeat evaluation. Needs knee imaging.    Questions and concerns answered.      Melina Martínez PA-C  11/18/24  21:46 EST      Dictated Utilizing Dragon Dictation.

## 2024-12-23 ENCOUNTER — OFFICE VISIT (OUTPATIENT)
Dept: CARDIOLOGY | Facility: CLINIC | Age: 79
End: 2024-12-23
Payer: MEDICARE

## 2024-12-23 VITALS
HEART RATE: 63 BPM | HEIGHT: 74 IN | OXYGEN SATURATION: 96 % | SYSTOLIC BLOOD PRESSURE: 110 MMHG | BODY MASS INDEX: 31.42 KG/M2 | WEIGHT: 244.8 LBS | DIASTOLIC BLOOD PRESSURE: 62 MMHG

## 2024-12-23 DIAGNOSIS — I10 ESSENTIAL HYPERTENSION: Primary | Chronic | ICD-10-CM

## 2024-12-23 DIAGNOSIS — E78.5 HYPERLIPIDEMIA LDL GOAL <100: Chronic | ICD-10-CM

## 2024-12-23 DIAGNOSIS — I50.32 CHRONIC DIASTOLIC (CONGESTIVE) HEART FAILURE: Chronic | ICD-10-CM

## 2024-12-23 NOTE — PROGRESS NOTES
OFFICE VISIT  NOTE  [facility] CARDIOLOGY      Name: Werner Brower    Date: 2024  MRN:  [id number]  :  1945      REFERRING/PRIMARY PROVIDER:  Mathew Olivarez MD    Chief Complaint   Patient presents with    Essential hypertension       HPI: Werner Brower is a 79 y.o. male who presents for pulmonary hypertension, and chronic diastolic heart failure.  History of hypertension, hyperlipidemia, elevated PSA.  2022 admitted at [facility], where they diuresed him, per PCP note echo showed grade 2 diastolic dysfunction, mild EF, normal EF, moderate pulmonary hypertension RVSP 55 to 61 mmHg.  Had a stress test and echo in  with Dr. Duncan, it showed mildly reversible inferior defect, no follow-up cardiac catheterization was recommended.  Echo 2024 showed EF 55 to 60% normal RVSP, aortic root measuring 3.8 cm.    He had left knee replacement surgery with Dr. Gill 10/2024.  Overall doing quite well, denies significant shortness of breath, blood pressures at physical therapy slightly elevated 140s to 150s systolic but this is usually when he is having some knee pain in his mid afternoon he will start checking his blood pressure at home in the mid morning timeframe.  Denies chest pain or shortness of breath      Past Medical History:   Diagnosis Date    Allergic several years ago    Ankle sprain     Arrhythmia     Arthritis     Back pain     BPH associated with nocturia     Cervical disc disorder     Colon polyp several years ago    Condition not found     BROKEN ELBOW    Dislocation of finger     Erectile dysfunction 3 years ago    Essential hypertension     Gallbladder problem     Gonarthrosis     BILATERAL    H/O colonoscopy     Heart murmur many years ago    High risk medication use     DRUG THERAPY FINDING    History of circumcision     HL (hearing loss) many years ago    Hyperlipidemia many years ago    Knee sprain     Migraine headache     Neck strain      Peptic ulceration     PONV (postoperative nausea and vomiting)     RLS (restless legs syndrome)     Rotator cuff syndrome     Skin problem     Sleep apnea     DOES NOT WEAR CPAP    Urinary tract infection several times 4 years ago    Visual impairment many years ago       Past Surgical History:   Procedure Laterality Date    BLEPHAROPLASTY      CHOLECYSTECTOMY      CIRCUMCISION      COLONOSCOPY  2010    SENSILE POLYPS 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, EXT HEMMORHOIDS REPEAT 3 YEARS (SANTIAGO)    ELBOW ARTHROSCOPY Left     ELBOW PROCEDURE      JOINT REPLACEMENT  right knee 2017    TOTAL KNEE ARTHROPLASTY Left 10/09/2024    Procedure: TOTAL KNEE ARTHROPLASTY WITH MARISELA ROBOT LEFT;  Surgeon: Mathew Gill MD;  Location: [facility];  Service: Robotics - Ortho;  Laterality: Left;       Social History     Socioeconomic History    Marital status:    Tobacco Use    Smoking status: Former     Current packs/day: 0.00     Types: Cigars, Cigarettes     Start date:      Quit date:      Years since quittin.0     Passive exposure: Past    Smokeless tobacco: Never   Vaping Use    Vaping status: Never Used   Substance and Sexual Activity    Alcohol use: Not Currently    Drug use: Never    Sexual activity: Not Currently     Partners: Female       Family History   Problem Relation Age of Onset    Hearing loss Mother     Hearing loss Father         ROS:   Constitutional no fever,  no weight loss   Skin no rash, no subcutaneous nodules   Otolaryngeal no difficulty swallowing   Cardiovascular See HPI   Pulmonary no cough, no sputum production   Gastrointestinal no constipation, no diarrhea   Genitourinary no dysuria, no hematuria   Hematologic no easy bruisability, no abnormal bleeding   Musculoskeletal no muscle pain   Neurologic no dizziness, no falls         Allergies   Allergen Reactions    Ace Inhibitors Other (See Comments)     Other reaction(s): cough    Cefuroxime GI Intolerance    Nitrofurantoin Other  "(See Comments)     GI INTOLERANCE         Current Outpatient Medications:     acetaminophen (TYLENOL) 500 MG tablet, Take 2 tablets by mouth Every 6 (Six) Hours As Needed for Mild Pain., Disp: , Rfl:     ascorbic acid (QC Vitamin C with Loreta Hips) 500 MG tablet, Take 1 tablet by mouth 2 (Two) Times a Day., Disp: , Rfl:     aspirin 81 MG EC tablet, Take 1 tablet by mouth Daily., Disp: , Rfl:     carboxymethylcellulose (REFRESH PLUS) 0.5 % solution, Daily As Needed for Dry Eyes., Disp: , Rfl:     carvedilol (COREG) 25 MG tablet, Take 1 tablet by mouth 2 (Two) Times a Day., Disp: , Rfl:     losartan (COZAAR) 100 MG tablet, Take 1 tablet by mouth Daily., Disp: , Rfl:     multivitamin with minerals (Multivitamin Men 50+) tablet tablet, Take 1 tablet by mouth Daily., Disp: , Rfl:     multivitamins-minerals (PRESERVISION AREDS 2) capsule capsule, Daily., Disp: , Rfl:     rosuvastatin (CRESTOR) 10 MG tablet, Take 1 tablet by mouth Daily., Disp: 90 tablet, Rfl: 2    tamsulosin (FLOMAX) 0.4 MG capsule 24 hr capsule, Take 1 capsule by mouth Daily., Disp: 90 capsule, Rfl: 2    Vitals:    12/23/24 1050   BP: 110/62   BP Location: Left arm   Patient Position: Sitting   Pulse: 63   SpO2: 96%   Weight: 111 kg (244 lb 12.8 oz)   Height: 186.7 cm (73.5\")     Body mass index is 31.86 kg/m².    PHYSICAL EXAM:    General Appearance:   well developed  well nourished  HENT:   oropharynx moist  lips not cyanotic  Neck:  thyroid not enlarged  No carotid bruit on exam  Respiratory:  no respiratory distress  normal breath sounds  no rales  Cardiovascular:  no jugular venous distention  regular rhythm  apical impulse normal  S1 normal, S2 normal  no S3, no S4   no murmur  no rub, no thrill  carotid pulses normal; no bruit  lower extremity edema: none      Musculoskeletal:  no clubbing of fingers.   normocephalic, head atraumatic  Skin:   warm, dry  Psychiatric:  judgement and insight appropriate  normal mood and affect  I performed a physical " examination today and reviewed the above copied physical examination, which was updated where appropriate and unchanged otherwise.  RESULTS:   Procedures    Results for orders placed in visit on 09/25/24    Adult Transthoracic Echo Complete W/ Cont if Necessary Per Protocol    Interpretation Summary    Left ventricular systolic function is normal. Calculated left ventricular EF = 54% Left ventricular ejection fraction appears to be 56 - 60%.    Left ventricular diastolic function was normal.    Estimated right ventricular systolic pressure from tricuspid regurgitation is normal (<35 mmHg). Calculated right ventricular systolic pressure from tricuspid regurgitation is 19 mmHg.    The aortic root measures 3.8 cm.  Which is normal        Labs:  Lab Results   Component Value Date    TRIG 133 10/18/2024    HDL 30 (L) 10/18/2024    LDL 36 10/18/2024    AST 23 10/18/2024    ALT 36 10/18/2024     Lab Results   Component Value Date    HGBA1C 6.2 (H) 10/18/2024       Most recent PCP note, imaging tests, and labs reviewed.    ASSESSMENT:  Problem List Items Addressed This Visit       Essential hypertension - Primary (Chronic)    Hyperlipidemia LDL goal <100 (Chronic)    Chronic diastolic (congestive) heart failure (Chronic)       PLAN:    1.  Chronic diastolic heart failure:  Echo from [facility] 11/2022 showed EF greater than 55% with grade 2 diastolic dysfunction, mild LVH, and moderate pulm hypertension RVSP 55 to 61 mmHg.  Repeat echo 9/2024 showed normal RVSP at 19 mmHg with EF 55 to 60%.    Agree with carvedilol and losartan  Advise low-sodium diet and continued aerobic exercise    2.  Abnormal nuclear stress test:  Stress test from 2021 reviewed, small reversible inferior defect noted by Dr. Duncan, no further testing recommended at that time.  Denies ischemic symptoms defer cardiac cath  Call if symptoms worsen and we will proceed with cath.    3.  Hyperlipidemia:  Well-controlled on current regimen  Continue rosuvastatin at  current dose    4.  Hypertension:  Goal blood pressure less than 140/90, relatively well-controlled he will start measuring his blood pressure at home midmorning with automatic cuff discussed proper technique.  Continue current medical therapy.    5.  Ascending aortic aneurysm:  4 cm 11/2022 on echo at [facility]  3.8 cm on echo 9/2024, no need for repeat surveillance imaging    6.  Sleep apnea:  Discussed importance of treating sleep apnea to improve pulmonary hypertension, but at this time he declines, does not want to use a machine.    7.  Moderate pulmonary hypertension, resolved 9/2024:  Echo 11/2022 at [facility] showed normal EF with moderate pulmonary hypertension RVSP 55 to 61 mmHg.    Repeat echo 9/2024 shows normal RVSP.  Continue exercise and blood pressure control and continue sleep apnea treatment      Advance Care Planning   ACP discussion was held with the patient during this visit. Patient does not have an advance directive, information provided.         Return to clinic in 12 months, or sooner as needed.    Thank you for the opportunity to share in the care of your patient; please do not hesitate to call me with any questions.     Delfino Schmitt MD, formerly [facility]  Office: [phone] [address] [place] [address]    12/23/24

## 2025-01-21 ENCOUNTER — OFFICE VISIT (OUTPATIENT)
Dept: ORTHOPEDIC SURGERY | Facility: CLINIC | Age: 80
End: 2025-01-21
Payer: MEDICARE

## 2025-01-21 VITALS
DIASTOLIC BLOOD PRESSURE: 62 MMHG | SYSTOLIC BLOOD PRESSURE: 110 MMHG | BODY MASS INDEX: 31.32 KG/M2 | HEIGHT: 74 IN | WEIGHT: 244 LBS

## 2025-01-21 DIAGNOSIS — Z96.652 S/P TOTAL KNEE ARTHROPLASTY, LEFT: Primary | ICD-10-CM

## 2025-01-21 NOTE — PROGRESS NOTES
Orthopaedic Clinic Note: Knee Established Patient    Chief Complaint   Patient presents with    Follow-up     3 month f/u-S/P Left Total Knee Arthroplasty (10/9/24))        HPI    It has been 2  month(s) since Mr. Brower's last visit. He returns to clinic today for follow-up left total knee arthroplasty.  He is 3-1/2 months out from surgery.  Rates his pain a 3/10 on the pain scale primarily due to some occasional stiffness.  He is ambulating with no assistive device.  Denies fevers chills or constitutional symptoms.  Overall he is doing better.      Past Medical History:   Diagnosis Date    Allergic several years ago    Ankle sprain 1985    Arrhythmia     Arthritis     Back pain     BPH associated with nocturia     Cervical disc disorder     Colon polyp several years ago    Condition not found     BROKEN ELBOW    Dislocation of finger 2020    Erectile dysfunction 3 years ago    Essential hypertension     Gallbladder problem     Gonarthrosis     BILATERAL    H/O colonoscopy     Heart murmur many years ago    High risk medication use     DRUG THERAPY FINDING    History of circumcision     HL (hearing loss) many years ago    Hyperlipidemia many years ago    Knee sprain     Migraine headache     Neck strain 1990    Peptic ulceration     PONV (postoperative nausea and vomiting)     RLS (restless legs syndrome)     Rotator cuff syndrome     Skin problem     Sleep apnea     DOES NOT WEAR CPAP    Urinary tract infection several times 4 years ago    Visual impairment many years ago      Past Surgical History:   Procedure Laterality Date    BLEPHAROPLASTY      CHOLECYSTECTOMY      CIRCUMCISION      COLONOSCOPY  02/24/2010    SENSILE POLYPS 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, EXT HEMMORHOIDS REPEAT 3 YEARS (SANTIAGO)    ELBOW ARTHROSCOPY Left     ELBOW PROCEDURE      JOINT REPLACEMENT  right knee 2017    TOTAL KNEE ARTHROPLASTY Left 10/09/2024    Procedure: TOTAL KNEE ARTHROPLASTY WITH MARISELA ROBOT LEFT;  Surgeon: Mathew Gill,  MD;  Location: [facility];  Service: Robotics - Ortho;  Laterality: Left;      Family History   Problem Relation Age of Onset    Hearing loss Mother     Hearing loss Father      Social History     Socioeconomic History    Marital status:    Tobacco Use    Smoking status: Former     Current packs/day: 0.00     Types: Cigarettes, Cigars     Start date:      Quit date: 1975     Years since quittin.7     Passive exposure: Past    Smokeless tobacco: Never   Vaping Use    Vaping status: Never Used   Substance and Sexual Activity    Alcohol use: Not Currently    Drug use: Never    Sexual activity: Not Currently     Partners: Female      Current Outpatient Medications on File Prior to Visit   Medication Sig Dispense Refill    ascorbic acid (QC Vitamin C with Loreta Hips) 500 MG tablet Take 1 tablet by mouth 2 (Two) Times a Day.      aspirin 81 MG EC tablet Take 1 tablet by mouth Daily.      carboxymethylcellulose (REFRESH PLUS) 0.5 % solution Daily As Needed for Dry Eyes.      carvedilol (COREG) 25 MG tablet Take 1 tablet by mouth 2 (Two) Times a Day.      losartan (COZAAR) 100 MG tablet Take 1 tablet by mouth Daily.      multivitamin with minerals (Multivitamin Men 50+) tablet tablet Take 1 tablet by mouth Daily.      multivitamins-minerals (PRESERVISION AREDS 2) capsule capsule Daily.      rosuvastatin (CRESTOR) 10 MG tablet Take 1 tablet by mouth Daily. 90 tablet 2    tamsulosin (FLOMAX) 0.4 MG capsule 24 hr capsule Take 1 capsule by mouth Daily. 90 capsule 2    [DISCONTINUED] acetaminophen (TYLENOL) 500 MG tablet Take 2 tablets by mouth Every 6 (Six) Hours As Needed for Mild Pain.       No current facility-administered medications on file prior to visit.      Allergies   Allergen Reactions    Ace Inhibitors Other (See Comments)     Other reaction(s): cough    Cefuroxime GI Intolerance    Nitrofurantoin Other (See Comments)     GI INTOLERANCE        Review of Systems   Constitutional: Negative.    HENT:  "Negative.     Eyes: Negative.    Respiratory: Negative.     Cardiovascular: Negative.    Gastrointestinal: Negative.    Endocrine: Negative.    Genitourinary: Negative.    Musculoskeletal:  Positive for arthralgias.   Skin: Negative.    Allergic/Immunologic: Negative.    Neurological: Negative.    Hematological: Negative.    Psychiatric/Behavioral: Negative.          The patient's Review of Systems was personally reviewed and confirmed as accurate.    Physical Exam  Blood pressure 110/62, height 186.7 cm (73.5\"), weight 111 kg (244 lb).    Body mass index is 31.75 kg/m².    GENERAL APPEARANCE: awake, alert, oriented, in no acute distress and well developed, well nourished  LUNGS:  breathing nonlabored  EXTREMITIES: no clubbing, cyanosis  PERIPHERAL PULSES: palpable dorsalis pedis and posterior tibial pulses bilaterally.    GAIT:  Normal        ----------  Left Knee Exam:  ----------  ALIGNMENT: neutral  ----------  RANGE OF MOTION:  Normal (0-120 degrees) with no extensor lag or flexion contracture  LIGAMENTOUS STABILITY:   stable to varus and valgus stress at terminal extension and 30 degrees without any evidence of laxity  ----------  STRENGTH:  KNEE FLEXION 5/5  KNEE EXTENSION  5/5  ANKLE DORSIFLEXION  5/5  ANKLE PLANTARFLEXION  5/5  ----------  PAIN WITH PALPATION:denies tenderness to palpation about the knee  KNEE EFFUSION: yes, trace effusion  PAIN WITH KNEE ROM: no  PATELLAR CREPITUS:  no  ----------  SENSATION TO LIGHT TOUCH:  DEEP PERONEAL/SUPERFICIAL PERONEAL/SURAL/SAPHENOUS/TIBIAL:    intact  ----------  EDEMA:  no  ERYTHEMA:    no  WOUNDS/INCISIONS:   yes, well healed surgical incision without evidence of erythema or drainage  _____________________________________________________________________  _____________________________________________________________________    RADIOGRAPHIC FINDINGS:   Indication: Status post left total knee arthroplasty    Comparison: Todays xrays were compared to previous xrays " from 11/18/2024    Knee films: Demonstrate well positioned knee arthroplasty components in satisfactory alignment without evidence of wear, loosening, subsidence, fracture, or osteolysis and No significant changes compared to prior radiographs.    Assessment/Plan:   Diagnosis Plan   1. S/P total knee arthroplasty, left  XR Knee 3+ View With [place] Left        Patient is doing well 3 and half month status post left total knee arthroplasty.  He does have some residual stiffness that is improving.  I explained that his inflammation and swelling will continue to improve as time goes on and that this will alleviate a lot of his concerns.  I recommend continued activity as tolerated without restrictions.  I will see him back in 9 months for repeat assessment with x-ray 3 views left knee on return.  He is welcome follow-up sooner should problems arise.    I recommend prophylactic antibiotics prior to invasive procedures indefinitely to minimize risk of prosthetic joint infection.  Amoxicillin 2 g orally 1 hour prior to procedure is recommended.        Mathew Gill MD  01/21/25  11:30 EST

## 2025-02-18 ENCOUNTER — LAB (OUTPATIENT)
Dept: FAMILY MEDICINE CLINIC | Facility: CLINIC | Age: 80
End: 2025-02-18
Payer: MEDICARE

## 2025-02-19 LAB
ALBUMIN SERPL-MCNC: 4 G/DL (ref 3.8–4.8)
ALP SERPL-CCNC: 59 IU/L (ref 44–121)
ALT SERPL-CCNC: 18 IU/L (ref 0–44)
AST SERPL-CCNC: 17 IU/L (ref 0–40)
BASOPHILS # BLD AUTO: 0 X10E3/UL (ref 0–0.2)
BASOPHILS NFR BLD AUTO: 0 %
BILIRUB SERPL-MCNC: 0.5 MG/DL (ref 0–1.2)
BUN SERPL-MCNC: 14 MG/DL (ref 8–27)
BUN/CREAT SERPL: 15 (ref 10–24)
CALCIUM SERPL-MCNC: 9.2 MG/DL (ref 8.6–10.2)
CHLORIDE SERPL-SCNC: 100 MMOL/L (ref 96–106)
CHOLEST SERPL-MCNC: 102 MG/DL (ref 100–199)
CO2 SERPL-SCNC: 25 MMOL/L (ref 20–29)
CREAT SERPL-MCNC: 0.95 MG/DL (ref 0.76–1.27)
EGFRCR SERPLBLD CKD-EPI 2021: 82 ML/MIN/1.73
EOSINOPHIL # BLD AUTO: 0.4 X10E3/UL (ref 0–0.4)
EOSINOPHIL NFR BLD AUTO: 5 %
ERYTHROCYTE [DISTWIDTH] IN BLOOD BY AUTOMATED COUNT: 13.3 % (ref 11.6–15.4)
GLOBULIN SER CALC-MCNC: 3.8 G/DL (ref 1.5–4.5)
GLUCOSE SERPL-MCNC: 109 MG/DL (ref 70–99)
HBA1C MFR BLD: 6.5 % (ref 4.8–5.6)
HCT VFR BLD AUTO: 38.7 % (ref 37.5–51)
HDLC SERPL-MCNC: 33 MG/DL
HGB BLD-MCNC: 13 G/DL (ref 13–17.7)
IMM GRANULOCYTES # BLD AUTO: 0.1 X10E3/UL (ref 0–0.1)
IMM GRANULOCYTES NFR BLD AUTO: 1 %
LDLC SERPL CALC-MCNC: 42 MG/DL (ref 0–99)
LYMPHOCYTES # BLD AUTO: 2.8 X10E3/UL (ref 0.7–3.1)
LYMPHOCYTES NFR BLD AUTO: 34 %
MCH RBC QN AUTO: 32.6 PG (ref 26.6–33)
MCHC RBC AUTO-ENTMCNC: 33.6 G/DL (ref 31.5–35.7)
MCV RBC AUTO: 97 FL (ref 79–97)
MONOCYTES # BLD AUTO: 0.9 X10E3/UL (ref 0.1–0.9)
MONOCYTES NFR BLD AUTO: 10 %
NEUTROPHILS # BLD AUTO: 4.2 X10E3/UL (ref 1.4–7)
NEUTROPHILS NFR BLD AUTO: 50 %
PLATELET # BLD AUTO: 294 X10E3/UL (ref 150–450)
POTASSIUM SERPL-SCNC: 4.6 MMOL/L (ref 3.5–5.2)
PROT SERPL-MCNC: 7.8 G/DL (ref 6–8.5)
RBC # BLD AUTO: 3.99 X10E6/UL (ref 4.14–5.8)
SODIUM SERPL-SCNC: 135 MMOL/L (ref 134–144)
TRIGL SERPL-MCNC: 160 MG/DL (ref 0–149)
VLDLC SERPL CALC-MCNC: 27 MG/DL (ref 5–40)
WBC # BLD AUTO: 8.4 X10E3/UL (ref 3.4–10.8)

## 2025-02-24 ENCOUNTER — TELEPHONE (OUTPATIENT)
Dept: FAMILY MEDICINE CLINIC | Facility: CLINIC | Age: 80
End: 2025-02-24
Payer: MEDICARE

## 2025-02-24 NOTE — PROGRESS NOTES
The message below may be given by the hub:    Please contact patient with [facility] lab result message.    There is a lab result message attached to their lab results... but I received notification that the results were not viewed within 5 days on [facility].  I need to make sure that they get their lab result message.    Thank you.

## 2025-02-24 NOTE — TELEPHONE ENCOUNTER
"Contacted pt to let pt know that pcp states with recent lab results that:    \"Your recent lab work returned with good blood count (no anemia or evidence for infection), and normal platelets.     Your metabolic panel returned with normal kidney function, normal liver enzymes, good electrolytes, and mild blood sugar elevation at 109 in the prediabetes range.     Your lipid panel returned with good cholesterol control.     Your A1c did return elevated at 6.5 which has worsened compared to your past labs.  Please keep your appointment with me as scheduled for February 25, 2025.\"    Pt verbally understood and stated he will be here tomorrow for f/u appointment with Dr. Olivarez.  "

## 2025-02-25 ENCOUNTER — OFFICE VISIT (OUTPATIENT)
Dept: FAMILY MEDICINE CLINIC | Facility: CLINIC | Age: 80
End: 2025-02-25
Payer: MEDICARE

## 2025-02-25 VITALS
SYSTOLIC BLOOD PRESSURE: 126 MMHG | OXYGEN SATURATION: 96 % | HEIGHT: 74 IN | WEIGHT: 251.8 LBS | HEART RATE: 85 BPM | DIASTOLIC BLOOD PRESSURE: 70 MMHG | BODY MASS INDEX: 32.31 KG/M2

## 2025-02-25 DIAGNOSIS — I10 ESSENTIAL HYPERTENSION: ICD-10-CM

## 2025-02-25 DIAGNOSIS — M17.0 PRIMARY OSTEOARTHRITIS OF BOTH KNEES: ICD-10-CM

## 2025-02-25 DIAGNOSIS — E78.5 HYPERLIPIDEMIA LDL GOAL <100: ICD-10-CM

## 2025-02-25 DIAGNOSIS — E66.09 CLASS 1 OBESITY DUE TO EXCESS CALORIES WITH SERIOUS COMORBIDITY AND BODY MASS INDEX (BMI) OF 32.0 TO 32.9 IN ADULT: ICD-10-CM

## 2025-02-25 DIAGNOSIS — R97.20 ELEVATED PSA: ICD-10-CM

## 2025-02-25 DIAGNOSIS — Z96.652 STATUS POST TOTAL LEFT KNEE REPLACEMENT: ICD-10-CM

## 2025-02-25 DIAGNOSIS — E66.811 CLASS 1 OBESITY DUE TO EXCESS CALORIES WITH SERIOUS COMORBIDITY AND BODY MASS INDEX (BMI) OF 32.0 TO 32.9 IN ADULT: ICD-10-CM

## 2025-02-25 DIAGNOSIS — I50.32 CHRONIC DIASTOLIC (CONGESTIVE) HEART FAILURE: ICD-10-CM

## 2025-02-25 DIAGNOSIS — G47.33 OSA (OBSTRUCTIVE SLEEP APNEA): ICD-10-CM

## 2025-02-25 DIAGNOSIS — R73.9 HYPERGLYCEMIA: ICD-10-CM

## 2025-02-25 DIAGNOSIS — Z00.00 GENERAL MEDICAL EXAM: Primary | ICD-10-CM

## 2025-02-25 DIAGNOSIS — I27.20 PULMONARY HYPERTENSION: Chronic | ICD-10-CM

## 2025-02-25 DIAGNOSIS — N40.1 BPH ASSOCIATED WITH NOCTURIA: ICD-10-CM

## 2025-02-25 DIAGNOSIS — R35.1 BPH ASSOCIATED WITH NOCTURIA: ICD-10-CM

## 2025-02-25 PROBLEM — R82.90 ABNORMAL URINE FINDINGS: Status: RESOLVED | Noted: 2024-10-23 | Resolved: 2025-02-25

## 2025-02-25 PROBLEM — M17.10 ARTHRITIS OF KNEE: Status: RESOLVED | Noted: 2024-10-09 | Resolved: 2025-02-25

## 2025-02-25 PROCEDURE — G2211 COMPLEX E/M VISIT ADD ON: HCPCS | Performed by: FAMILY MEDICINE

## 2025-02-25 PROCEDURE — 3074F SYST BP LT 130 MM HG: CPT | Performed by: FAMILY MEDICINE

## 2025-02-25 PROCEDURE — 1125F AMNT PAIN NOTED PAIN PRSNT: CPT | Performed by: FAMILY MEDICINE

## 2025-02-25 PROCEDURE — 99214 OFFICE O/P EST MOD 30 MIN: CPT | Performed by: FAMILY MEDICINE

## 2025-02-25 PROCEDURE — 3078F DIAST BP <80 MM HG: CPT | Performed by: FAMILY MEDICINE

## 2025-02-25 PROCEDURE — G0439 PPPS, SUBSEQ VISIT: HCPCS | Performed by: FAMILY MEDICINE

## 2025-02-25 RX ORDER — CARVEDILOL 25 MG/1
25 TABLET ORAL 2 TIMES DAILY
Start: 2025-02-25

## 2025-02-25 RX ORDER — ROSUVASTATIN CALCIUM 10 MG/1
10 TABLET, COATED ORAL DAILY
Qty: 90 TABLET | Refills: 2 | Status: SHIPPED | OUTPATIENT
Start: 2025-02-25

## 2025-02-25 RX ORDER — TAMSULOSIN HYDROCHLORIDE 0.4 MG/1
1 CAPSULE ORAL DAILY
Qty: 90 CAPSULE | Refills: 2 | Status: SHIPPED | OUTPATIENT
Start: 2025-02-25

## 2025-02-25 RX ORDER — LOSARTAN POTASSIUM 100 MG/1
100 TABLET ORAL DAILY
Start: 2025-02-25

## 2025-02-25 NOTE — ASSESSMENT & PLAN NOTE
Continue problems with flexion and range of motion after left knee replacement.  We did discuss that it has only been 4 months and it can take a full year to get his full range of motion back.  He does feel that he was discharged early from [facility] physical therapy and would like to restart physical therapy with [facility].  Order given today to restart physical therapy following his knee replacement surgery given some ongoing difficulties.

## 2025-02-25 NOTE — ASSESSMENT & PLAN NOTE
Congestive heart failure due to hypertension.  Heart failure is improving with treatment.  NYHA Class I.  Continue current treatment regimen.  Heart failure will be reassessed  at next regular follow-up visit .

## 2025-02-25 NOTE — ASSESSMENT & PLAN NOTE
Discussed together health maintenance and screening along with vaccination options and healthy diet and exercise habits as part of the preventative counseling at their physical exam today.     He will get us a copy of his advance directive.

## 2025-02-25 NOTE — ASSESSMENT & PLAN NOTE
Reviewed lab work and worsening A1c elevation.  He would like to work on diet and exercise efforts before starting medication.  Discussed low sugar low-carb diet with exercise and we will plan to recheck with a fingerstick A1c at his follow-up visit in July

## 2025-02-25 NOTE — LETTER
February 25, 2025     Patient: Werner Brower   YOB: 1945   Date of Visit: 2/25/2025       Dx L knee pain and limitations with range of motion after knee replacement (10/9/24)    Order for physical therapy up to 3 times/week for 6 weeks.  Please evaluate and treat.      Sincerely,        Mathew Olivarez MD

## 2025-02-25 NOTE — PROGRESS NOTES
Subjective   The ABCs of the Annual Wellness Visit  Medicare Wellness Visit      Werner Brower is a 79 y.o. patient who presents for a Medicare Wellness Visit.    The following portions of the patient's history were reviewed and   updated as appropriate: allergies, current medications, past family history, past medical history, past social history, past surgical history, and problem list.    Compared to one year ago, the patient's physical   health is better.  Compared to one year ago, the patient's mental   health is better.    Recent Hospitalizations:  He was not admitted within the past 365 days - outpatient surgery 10/2024 for L TKA.     Current Medical Providers:  Patient Care Team:  Mathew Olivarez MD as PCP - General (Family Medicine)  Mathew Gill MD as Consulting Physician (Orthopedic Surgery)  Delfino Schmitt MD as Consulting Physician (Cardiology)  Yobany Fried MD (Urology)    Outpatient Medications Prior to Visit   Medication Sig Dispense Refill    ascorbic acid (QC Vitamin C with Loreta Hips) 500 MG tablet Take 1 tablet by mouth 2 (Two) Times a Day.      aspirin 81 MG EC tablet Take 1 tablet by mouth Daily.      carboxymethylcellulose (REFRESH PLUS) 0.5 % solution Daily As Needed for Dry Eyes.      multivitamin with minerals (Multivitamin Men 50+) tablet tablet Take 1 tablet by mouth Daily.      multivitamins-minerals (PRESERVISION AREDS 2) capsule capsule Daily.      carvedilol (COREG) 25 MG tablet Take 1 tablet by mouth 2 (Two) Times a Day.      losartan (COZAAR) 100 MG tablet Take 1 tablet by mouth Daily.      rosuvastatin (CRESTOR) 10 MG tablet Take 1 tablet by mouth Daily. 90 tablet 2    tamsulosin (FLOMAX) 0.4 MG capsule 24 hr capsule Take 1 capsule by mouth Daily. 90 capsule 2     No facility-administered medications prior to visit.     No opioid medication identified on active medication list. I have reviewed chart for other potential  high risk medication/s and  "harmful drug interactions in the elderly.      Aspirin is on active medication list. Aspirin use is indicated based on review of current medical condition/s. Pros and cons of this therapy have been discussed today. Benefits of this medication outweigh potential harm.  Patient has been encouraged to continue taking this medication.  .      Patient Active Problem List   Diagnosis    BPH associated with nocturia    Essential hypertension    Primary osteoarthritis of both knees    Hyperlipidemia LDL goal <100    Pulmonary hypertension    Hyperglycemia    General medical exam    Elevated PSA    Chronic diastolic (congestive) heart failure    KIRSTIN (obstructive sleep apnea)    Class 1 obesity due to excess calories with serious comorbidity and body mass index (BMI) of 32.0 to 32.9 in adult    Status post total left knee replacement     Advance Care Planning Advance Directive is not on file.  ACP discussion was held with the patient during this visit. Patient does not have an advance directive, information provided.            Objective   Vitals:    02/25/25 1054   BP: 126/70   BP Location: Left arm   Patient Position: Sitting   Cuff Size: Large Adult   Pulse: 85   SpO2: 96%   Weight: 114 kg (251 lb 12.8 oz)   Height: 186.7 cm (73.5\")   PainSc: 2    PainLoc: Knee       Estimated body mass index is 32.77 kg/m² as calculated from the following:    Height as of this encounter: 186.7 cm (73.5\").    Weight as of this encounter: 114 kg (251 lb 12.8 oz).                Does the patient have evidence of cognitive impairment? No  Lab Results   Component Value Date    CHLPL 102 02/18/2025    TRIG 160 (H) 02/18/2025    HDL 33 (L) 02/18/2025    LDL 42 02/18/2025    VLDL 27 02/18/2025    HGBA1C 6.5 (H) 02/18/2025                                                                                                Health  Risk Assessment    Smoking Status:  Social History     Tobacco Use   Smoking Status Former    Current packs/day: 0.00    " Average packs/day: 0.5 packs/day for 11.3 years (5.7 ttl pk-yrs)    Types: Cigarettes, Cigars    Start date:     Quit date: 1975    Years since quittin.5    Passive exposure: Past   Smokeless Tobacco Never     Alcohol Consumption:  Social History     Substance and Sexual Activity   Alcohol Use Not Currently       Fall Risk Screen  SHAILAADI Fall Risk Assessment was completed, and patient is at LOW risk for falls.Assessment completed on:2025    Depression Screening   Little interest or pleasure in doing things? Not at all   Feeling down, depressed, or hopeless? Not at all   PHQ-2 Total Score 0      Health Habits and Functional and Cognitive Screenin/18/2025     1:51 PM   Functional & Cognitive Status   Do you have difficulty preparing food and eating? No    Do you have difficulty bathing yourself, getting dressed or grooming yourself? Yes    Do you have difficulty using the toilet? No    Do you have difficulty moving around from place to place? No    Do you have trouble with steps or getting out of a bed or a chair? Yes    Current Diet Well Balanced Diet    Dental Exam Up to date    Eye Exam Up to date    Exercise (times per week) 7 times per week    Current Exercises Include Walking    Do you need help using the phone?  No    Are you deaf or do you have serious difficulty hearing?  Yes    Do you need help to go to places out of walking distance? No    Do you need help shopping? No    Do you need help preparing meals?  No    Do you need help with housework?  Yes    Do you need help with laundry? Yes    Do you need help taking your medications? No    Do you need help managing money? No    Do you ever drive or ride in a car without wearing a seat belt? No    Have you felt unusual stress, anger or loneliness in the last month? No    Who do you live with? Spouse    If you need help, do you have trouble finding someone available to you? No    Have you been bothered in the last four weeks by sexual  problems? Yes    Do you have difficulty concentrating, remembering or making decisions? No        Patient-reported           Age-appropriate Screening Schedule:  Refer to the list below for future screening recommendations based on patient's age, sex and/or medical conditions. Orders for these recommended tests are listed in the plan section. The patient has been provided with a written plan.    Health Maintenance List  Health Maintenance   Topic Date Due    LIPID PANEL  02/18/2026    ANNUAL WELLNESS VISIT  02/25/2026    BMI FOLLOWUP  02/25/2026    TDAP/TD VACCINES (2 - Td or Tdap) 05/29/2028    HEPATITIS C SCREENING  Completed    COVID-19 Vaccine  Completed    INFLUENZA VACCINE  Completed    Pneumococcal Vaccine 50+  Completed    RSV Vaccine - Adults  Discontinued    ZOSTER VACCINE  Discontinued    COLORECTAL CANCER SCREENING  Discontinued                                                                                                                                                CMS Preventative Services Quick Reference  Risk Factors Identified During Encounter  Fall Risk-High or Moderate: Discussed Fall Prevention in the home    The above risks/problems have been discussed with the patient.  Pertinent information has been shared with the patient in the After Visit Summary.  An After Visit Summary and PPPS were made available to the patient.    Follow Up:   Next Medicare Wellness visit to be scheduled in 1 year.         Additional E&M Note during same encounter follows:  Patient has additional, significant, and separately identifiable condition(s)/problem(s) that require work above and beyond the Medicare Wellness Visit     Chief Complaint  Hyperglycemia/prediabetes, HTN, Hyperlipidemia, BPH/Elevated PSA, and followup after recent L TKA October 2024  Harry MCGARRY  Werner is also being seen today for additional medical problem/s.  Hyperglycemia/prediabetes, HTN, Hyperlipidemia, BPH/Elevated PSA, and followup  after recent L TKA October 2024      Werner Brower is a 79 y.o. male who presents today for followup visit and medication refills     Doing well after our visit together but still has problems with range of motion and pain with his left knee after his left knee replacement in October 2024 with Dr. Gill.    [facility] did discharge him and he did complete longer physical therapy with his right knee and he is interested in restarting therapy with [facility].    No falls.    Recent labs did show worsening A1c up to 6.5.  We did discuss this today and he would like to work harder with diet and exercise efforts before medications.  We will plan for a follow-up in 4 months for recheck on a fingerstick A1c.    He did have an episode of volume overload with heart failure that resulted from his volume overload but his ejection fraction returned to normal after a solution of his volume overload episode.  His BNP returned normal after resolution of his acute volume overload.  He is following with Dr. Schmitt regarding his chronic diastolic heart failure.    KIRSTIN workup did show sleep apnea  - discussed again today - he declines treatment and does understand this can contribute to worsening pulm HTN and diastolic CHF.      He continues on carvedilol, losartan, low-dose aspirin, Crestor, and was taken off Vascepa by Cardiology.      He is following with urology given past PSA elevation at 4.7.  He is now on Flomax for BPH symptoms and he does continues with PSA monitoring with Urology (Dr Fried).       Encouraged advance directive.  He does report that he has an up-to-date advance directive and will get us a copy for chart update.    Review of Systems   Constitutional:  Negative for activity change, appetite change, chills, fatigue and fever.   HENT:  Negative for ear pain, hearing loss and trouble swallowing.    Eyes:  Negative for pain and visual disturbance.   Respiratory:  Negative for cough, chest  "tightness, shortness of breath and wheezing.    Cardiovascular:  Negative for chest pain, palpitations and leg swelling.   Gastrointestinal:  Negative for abdominal pain and blood in stool.   Genitourinary:  Negative for difficulty urinating.   Musculoskeletal:  Positive for arthralgias and gait problem. Negative for back pain and joint swelling.        Ongoing problems with range of motion and pain in his left knee after his knee replacement October 2024.  Would like to restart physical therapy   Skin:  Negative for rash.   Neurological:  Negative for dizziness, weakness and light-headedness.   Psychiatric/Behavioral:  Negative for agitation, behavioral problems, dysphoric mood and sleep disturbance.               Objective   Vital Signs:  /70 (BP Location: Left arm, Patient Position: Sitting, Cuff Size: Large Adult)   Pulse 85   Ht 186.7 cm (73.5\")   Wt 114 kg (251 lb 12.8 oz)   SpO2 96%   BMI 32.77 kg/m²   Physical Exam  Constitutional:       Appearance: He is obese.   HENT:      Head: Normocephalic.      Right Ear: External ear normal.      Left Ear: External ear normal.      Nose: Nose normal.   Eyes:      Pupils: Pupils are equal, round, and reactive to light.   Cardiovascular:      Rate and Rhythm: Normal rate and regular rhythm.      Heart sounds: Normal heart sounds.   Pulmonary:      Effort: Pulmonary effort is normal.      Breath sounds: Normal breath sounds.   Musculoskeletal:      Comments: Continue problems with flexion and range of motion after left knee replacement.  We did discuss that it has only been 4 months and it can take a full year to get his full range of motion back.  He does feel that he was discharged early from [facility] physical therapy and would like to restart physical therapy with [facility].  Order given today to restart physical therapy following his knee replacement surgery given some ongoing difficulties.   Skin:     General: Skin is warm and dry.   Neurological:      " General: No focal deficit present.      Mental Status: He is alert and oriented to person, place, and time.      Motor: No weakness.   Psychiatric:         Mood and Affect: Mood normal.         Behavior: Behavior normal.         Thought Content: Thought content normal.                       Assessment and Plan       Diagnoses and all orders for this visit:    1. General medical exam (Primary)  Assessment & Plan:  Discussed together health maintenance and screening along with vaccination options and healthy diet and exercise habits as part of the preventative counseling at their physical exam today.     He will get us a copy of his advance directive.      2. Essential hypertension  Assessment & Plan:  Hypertension is stable and controlled  Continue current treatment regimen.  Weight loss.  Regular aerobic exercise.  Blood pressure will be reassessed  4 mos .    Orders:  -     Cancel: Comprehensive Metabolic Panel; Future  -     Cancel: Lipid Panel; Future  -     carvedilol (COREG) 25 MG tablet; Take 1 tablet by mouth 2 (Two) Times a Day.  -     losartan (COZAAR) 100 MG tablet; Take 1 tablet by mouth Daily.    3. Hyperlipidemia LDL goal <100  Assessment & Plan:   Lipid abnormalities are improving with treatment    Plan:  Continue same medication/s without change.      Discussed medication dosage, use, side effects, and goals of treatment in detail.    Counseled patient on lifestyle modifications to help control hyperlipidemia.     Patient Treatment Goals:   LDL goal is under 100    Followup at the next regular appointment.    Orders:  -     Cancel: Comprehensive Metabolic Panel; Future  -     Cancel: Lipid Panel; Future  -     rosuvastatin (CRESTOR) 10 MG tablet; Take 1 tablet by mouth Daily.  Dispense: 90 tablet; Refill: 2    4. Hyperglycemia  Assessment & Plan:  Reviewed lab work and worsening A1c elevation.  He would like to work on diet and exercise efforts before starting medication.  Discussed low sugar low-carb  diet with exercise and we will plan to recheck with a fingerstick A1c at his follow-up visit in July    Orders:  -     Cancel: Hemoglobin A1c; Future    5. Primary osteoarthritis of both knees  Assessment & Plan:  Continue problems with flexion and range of motion after left knee replacement.  We did discuss that it has only been 4 months and it can take a full year to get his full range of motion back.  He does feel that he was discharged early from [facility] physical therapy and would like to restart physical therapy with [facility].  Order given today to restart physical therapy following his knee replacement surgery given some ongoing difficulties.      6. KIRSTIN (obstructive sleep apnea)  Assessment & Plan:  Encouraged KIRSTIN tx - pt declines    Discussed CV risks with untreated KIRSTIN      7. Chronic diastolic (congestive) heart failure  Assessment & Plan:  Congestive heart failure due to hypertension.  Heart failure is improving with treatment.  NYHA Class I.  Continue current treatment regimen.  Heart failure will be reassessed  at next regular follow-up visit .    Orders:  -     carvedilol (COREG) 25 MG tablet; Take 1 tablet by mouth 2 (Two) Times a Day.  -     losartan (COZAAR) 100 MG tablet; Take 1 tablet by mouth Daily.  -     rosuvastatin (CRESTOR) 10 MG tablet; Take 1 tablet by mouth Daily.  Dispense: 90 tablet; Refill: 2    8. BPH associated with nocturia  Assessment & Plan:  Continues Flomax     Ongoing follow-up and PSA monitoring with urology (Dr. Fried).    Orders:  -     tamsulosin (FLOMAX) 0.4 MG capsule 24 hr capsule; Take 1 capsule by mouth Daily.  Dispense: 90 capsule; Refill: 2    9. Elevated PSA  Assessment & Plan:  Ongoing follow-up with urology.        10. Status post total left knee replacement  Assessment & Plan:  Continue problems with flexion and range of motion after left knee replacement.  We did discuss that it has only been 4 months and it can take a full year to get his full range of motion  back.  He does feel that he was discharged early from [facility] physical therapy and would like to restart physical therapy with [facility].  Order given today to restart physical therapy following his knee replacement surgery given some ongoing difficulties.      11. Pulmonary hypertension  Assessment & Plan:  Continues on treatment from cardiology but declines KIRSTIN tx       12. Class 1 obesity due to excess calories with serious comorbidity and body mass index (BMI) of 32.0 to 32.9 in adult  Assessment & Plan:  Patient's (Body mass index is 32.77 kg/m².) indicates that they are obese (BMI >30) with health conditions that include hypertension, dyslipidemias, and osteoarthritis . Weight is improving with lifestyle modifications. BMI  is above average; BMI management plan is completed. We discussed portion control and increasing exercise.                 Follow Up   Return in about 4 months (around 6/25/2025).  Patient was given instructions and counseling regarding his condition or for health maintenance advice. Please see specific information pulled into the AVS if appropriate.

## 2025-02-25 NOTE — ASSESSMENT & PLAN NOTE
Patient's (Body mass index is 32.77 kg/m².) indicates that they are obese (BMI >30) with health conditions that include hypertension, dyslipidemias, and osteoarthritis . Weight is improving with lifestyle modifications. BMI  is above average; BMI management plan is completed. We discussed portion control and increasing exercise.

## 2025-02-25 NOTE — ASSESSMENT & PLAN NOTE
Continue problems with flexion and range of motion after left knee replacement.  We did discuss that it has only been 4 months and it can take a full year to get his full range of motion back.  He does feel that he was discharged early from [facility] physical therapy and would like to restart physical therapy with [facility].  Order given today to restart physical therapy following his knee replacement surgery given some ongoing difficulties.

## 2025-03-08 ENCOUNTER — OFFICE VISIT (OUTPATIENT)
Dept: FAMILY MEDICINE CLINIC | Facility: CLINIC | Age: 80
End: 2025-03-08
Payer: MEDICARE

## 2025-03-08 VITALS
BODY MASS INDEX: 32.24 KG/M2 | HEIGHT: 74 IN | DIASTOLIC BLOOD PRESSURE: 64 MMHG | HEART RATE: 60 BPM | SYSTOLIC BLOOD PRESSURE: 126 MMHG | WEIGHT: 251.2 LBS | OXYGEN SATURATION: 97 %

## 2025-03-08 DIAGNOSIS — J20.9 ACUTE BRONCHITIS WITH BRONCHOSPASM: Primary | ICD-10-CM

## 2025-03-08 RX ORDER — DOXYCYCLINE 100 MG/1
100 CAPSULE ORAL 2 TIMES DAILY
Qty: 20 CAPSULE | Refills: 0 | Status: SHIPPED | OUTPATIENT
Start: 2025-03-08

## 2025-03-08 RX ORDER — TRIAMCINOLONE ACETONIDE 40 MG/ML
80 INJECTION, SUSPENSION INTRA-ARTICULAR; INTRAMUSCULAR ONCE
Status: COMPLETED | OUTPATIENT
Start: 2025-03-08 | End: 2025-03-08

## 2025-03-08 RX ORDER — GUAIFENESIN AND DEXTROMETHORPHAN HYDROBROMIDE 1200; 60 MG/1; MG/1
1 TABLET, EXTENDED RELEASE ORAL 2 TIMES DAILY
Qty: 28 EACH | Refills: 1 | Status: SHIPPED | OUTPATIENT
Start: 2025-03-08

## 2025-03-08 RX ADMIN — TRIAMCINOLONE ACETONIDE 80 MG: 40 INJECTION, SUSPENSION INTRA-ARTICULAR; INTRAMUSCULAR at 09:42

## 2025-03-08 NOTE — PROGRESS NOTES
"Chief Complaint  Nasal Congestion and Cough (Symptoms x1 week )    Subjective          Werner Brower presents to [facility]    Cough  Associated symptoms include postnasal drip and wheezing. Pertinent negatives include no chest pain, ear pain, sore throat or shortness of breath.     Patient comes in today with cough, congestion ongoing for about a week.  No fever or chills.      Objective   Vital Signs:   /64   Pulse 60   Ht 186.7 cm (73.5\")   Wt 114 kg (251 lb 3.2 oz)   SpO2 97%   BMI 32.69 kg/m²     Body mass index is 32.69 kg/m².    Review of Systems   Constitutional:  Negative for fatigue.   HENT:  Positive for congestion and postnasal drip. Negative for ear pain, sinus pressure, sore throat and swollen glands.    Eyes:  Negative for blurred vision.   Respiratory:  Positive for cough and wheezing. Negative for chest tightness and shortness of breath.    Cardiovascular:  Negative for chest pain, palpitations and leg swelling.   Gastrointestinal:  Negative for abdominal pain, constipation, diarrhea, nausea and vomiting.   Neurological:  Negative for dizziness, tremors and headache.   Psychiatric/Behavioral:  Negative for depressed mood. The patient is not nervous/anxious.        Past History:  Medical History: has a past medical history of Allergic (several years ago), Ankle sprain (1985), Arrhythmia, Arthritis, Back pain, BPH associated with nocturia, Cervical disc disorder, Colon polyp (several years ago), Condition not found, Dislocation of finger (2020), Erectile dysfunction (3 years ago), Essential hypertension, Gallbladder problem, Gonarthrosis, H/O colonoscopy, Heart murmur (many years ago), High risk medication use, History of circumcision, HL (hearing loss) (many years ago), Hyperlipidemia (many years ago), Knee sprain, Migraine headache, Neck strain (1990), Peptic ulceration, PONV (postoperative nausea and vomiting), RLS (restless legs syndrome), Rotator cuff " syndrome, Skin problem, Sleep apnea, Urinary tract infection (several times 4 years ago), and Visual impairment (many years ago).   Surgical History: has a past surgical history that includes Cholecystectomy; Colonoscopy (2010); Elbow arthroscopy (Left); Circumcision, non-; Joint replacement (right knee ); Blepharoplasty; Total knee arthroplasty (Left, 10/09/2024); and Elbow surgery.   Family History: family history includes Hearing loss in his father and mother.   Social History: reports that he quit smoking about 49 years ago. His smoking use included cigarettes and cigars. He started smoking about 62 years ago. He has a 5.7 pack-year smoking history. He has been exposed to tobacco smoke. He has never used smokeless tobacco. He reports that he does not currently use alcohol. He reports that he does not use drugs.      Current Outpatient Medications:     ascorbic acid (QC Vitamin C with Loreta Hips) 500 MG tablet, Take 1 tablet by mouth 2 (Two) Times a Day., Disp: , Rfl:     aspirin 81 MG EC tablet, Take 1 tablet by mouth Daily., Disp: , Rfl:     carboxymethylcellulose (REFRESH PLUS) 0.5 % solution, Daily As Needed for Dry Eyes., Disp: , Rfl:     carvedilol (COREG) 25 MG tablet, Take 1 tablet by mouth 2 (Two) Times a Day., Disp: , Rfl:     losartan (COZAAR) 100 MG tablet, Take 1 tablet by mouth Daily., Disp: , Rfl:     multivitamin with minerals (Multivitamin Men 50+) tablet tablet, Take 1 tablet by mouth Daily., Disp: , Rfl:     multivitamins-minerals (PRESERVISION AREDS 2) capsule capsule, Daily., Disp: , Rfl:     rosuvastatin (CRESTOR) 10 MG tablet, Take 1 tablet by mouth Daily., Disp: 90 tablet, Rfl: 2    tamsulosin (FLOMAX) 0.4 MG capsule 24 hr capsule, Take 1 capsule by mouth Daily., Disp: 90 capsule, Rfl: 2    Dextromethorphan-guaiFENesin (Mucinex DM Maximum Strength)  MG tablet sustained-release 12 hour, Take 1 tablet by mouth 2 (Two) Times a Day., Disp: 28 each, Rfl: 1    doxycycline  (MONODOX) 100 MG capsule, Take 1 capsule by mouth 2 (Two) Times a Day., Disp: 20 capsule, Rfl: 0    Current Facility-Administered Medications:     triamcinolone acetonide (KENALOG-40) injection 80 mg, 80 mg, Intramuscular, Once, Means, Missy, PA-C    Allergies: Ace inhibitors, Cefuroxime, and Nitrofurantoin    Physical Exam  Constitutional:       Appearance: Normal appearance.   HENT:      Right Ear: Tympanic membrane, ear canal and external ear normal.      Left Ear: Tympanic membrane, ear canal and external ear normal.      Nose: Nose normal.      Mouth/Throat:      Mouth: Mucous membranes are moist.   Cardiovascular:      Rate and Rhythm: Normal rate and regular rhythm.      Heart sounds: Normal heart sounds. No murmur heard.  Pulmonary:      Effort: No respiratory distress.      Breath sounds: Wheezing and rhonchi present. No rales.   Neurological:      Mental Status: He is alert and oriented to person, place, and time.   Psychiatric:         Behavior: Behavior normal.          Result Review :                   Assessment and Plan    Diagnoses and all orders for this visit:    1. Acute bronchitis with bronchospasm (Primary)  Assessment & Plan:  Rx for doxycycline and Mucinex DM and I am giving him a steroid shot in the office today.  He can call or return if symptoms persist or worsen.    Orders:  -     triamcinolone acetonide (KENALOG-40) injection 80 mg  -     POCT SARS-CoV-2 Antigen ROSALIE + Flu    Other orders  -     doxycycline (MONODOX) 100 MG capsule; Take 1 capsule by mouth 2 (Two) Times a Day.  Dispense: 20 capsule; Refill: 0  -     Dextromethorphan-guaiFENesin (Mucinex DM Maximum Strength)  MG tablet sustained-release 12 hour; Take 1 tablet by mouth 2 (Two) Times a Day.  Dispense: 28 each; Refill: 1        Follow Up   Return if symptoms worsen or fail to improve.  Patient was given instructions and counseling regarding his condition or for health maintenance advice. Please see specific  information pulled into the AVS if appropriate.     Missy Sylvester PA-C

## 2025-04-22 ENCOUNTER — OFFICE VISIT (OUTPATIENT)
Dept: FAMILY MEDICINE CLINIC | Facility: CLINIC | Age: 80
End: 2025-04-22
Payer: MEDICARE

## 2025-04-22 VITALS
BODY MASS INDEX: 31.83 KG/M2 | OXYGEN SATURATION: 97 % | HEIGHT: 74 IN | WEIGHT: 248 LBS | DIASTOLIC BLOOD PRESSURE: 60 MMHG | HEART RATE: 79 BPM | SYSTOLIC BLOOD PRESSURE: 138 MMHG

## 2025-04-22 DIAGNOSIS — J22 LOWER RESPIRATORY TRACT INFECTION: Primary | ICD-10-CM

## 2025-04-22 RX ORDER — AZITHROMYCIN 500 MG/1
500 TABLET, FILM COATED ORAL DAILY
Qty: 5 TABLET | Refills: 0 | Status: SHIPPED | OUTPATIENT
Start: 2025-04-22

## 2025-04-22 RX ORDER — PREDNISONE 10 MG/1
TABLET ORAL
Qty: 21 TABLET | Refills: 0 | Status: SHIPPED | OUTPATIENT
Start: 2025-04-22

## 2025-04-22 RX ORDER — CEFTRIAXONE 1 G/1
1 INJECTION, POWDER, FOR SOLUTION INTRAMUSCULAR; INTRAVENOUS ONCE
Status: COMPLETED | OUTPATIENT
Start: 2025-04-22 | End: 2025-04-22

## 2025-04-22 RX ORDER — DEXTROMETHORPHAN HYDROBROMIDE AND PROMETHAZINE HYDROCHLORIDE 15; 6.25 MG/5ML; MG/5ML
5 SYRUP ORAL NIGHTLY PRN
Qty: 120 ML | Refills: 0 | Status: SHIPPED | OUTPATIENT
Start: 2025-04-22

## 2025-04-22 RX ORDER — DEXAMETHASONE SODIUM PHOSPHATE 10 MG/ML
10 INJECTION, SOLUTION INTRA-ARTICULAR; INTRALESIONAL; INTRAMUSCULAR; INTRAVENOUS; SOFT TISSUE ONCE
Status: COMPLETED | OUTPATIENT
Start: 2025-04-22 | End: 2025-04-22

## 2025-04-22 RX ADMIN — CEFTRIAXONE 1 G: 1 INJECTION, POWDER, FOR SOLUTION INTRAMUSCULAR; INTRAVENOUS at 14:39

## 2025-04-22 RX ADMIN — DEXAMETHASONE SODIUM PHOSPHATE 10 MG: 10 INJECTION, SOLUTION INTRA-ARTICULAR; INTRALESIONAL; INTRAMUSCULAR; INTRAVENOUS; SOFT TISSUE at 14:38

## 2025-04-22 NOTE — PROGRESS NOTES
Follow Up Office Visit      Patient Name: Werner Brower  : 1945   MRN: [id number]     Chief Complaint:    Chief Complaint   Patient presents with    Sinusitis     Pt c/o sinus pressure, headache, cough, congestion, chest, head and ear congestion; denies fever, body aches, chills; onset:last Friday       History of Present Illness: Werner Brower is a 79 y.o. male who is here today for follow up with 5 days of cough, congestion, shortness of breath, ear fullness, fever, body aches.  Patient states he feels rattling in his right lung.    Subjective      Review of Systems:   Review of Systems   Constitutional:  Positive for fatigue and fever.   HENT:  Positive for congestion and ear pain.    Respiratory:  Positive for cough.        The following portions of the patient's history were reviewed and updated as appropriate: allergies, current medications, past family history, past medical history, past social history, past surgical history and problem list.    Medications:     Current Outpatient Medications:     ascorbic acid (QC Vitamin C with Loreta Hips) 500 MG tablet, Take 1 tablet by mouth 2 (Two) Times a Day., Disp: , Rfl:     aspirin 81 MG EC tablet, Take 1 tablet by mouth Daily., Disp: , Rfl:     carboxymethylcellulose (REFRESH PLUS) 0.5 % solution, Daily As Needed for Dry Eyes., Disp: , Rfl:     carvedilol (COREG) 25 MG tablet, Take 1 tablet by mouth 2 (Two) Times a Day., Disp: , Rfl:     losartan (COZAAR) 100 MG tablet, Take 1 tablet by mouth Daily., Disp: , Rfl:     multivitamin with minerals (Multivitamin Men 50+) tablet tablet, Take 1 tablet by mouth Daily., Disp: , Rfl:     rosuvastatin (CRESTOR) 10 MG tablet, Take 1 tablet by mouth Daily., Disp: 90 tablet, Rfl: 2    tamsulosin (FLOMAX) 0.4 MG capsule 24 hr capsule, Take 1 capsule by mouth Daily., Disp: 90 capsule, Rfl: 2    azithromycin (ZITHROMAX) 500 MG tablet, Take 1 tablet by mouth Daily., Disp: 5 tablet, Rfl: 0    predniSONE (DELTASONE)  "10 MG (21) dose pack, Use as directed on package, Disp: 21 tablet, Rfl: 0    promethazine-dextromethorphan (PROMETHAZINE-DM) 6.25-15 MG/5ML syrup, Take 5 mL by mouth At Night As Needed for Cough., Disp: 120 mL, Rfl: 0    Current Facility-Administered Medications:     cefTRIAXone (ROCEPHIN) injection 1 g, 1 g, Intramuscular, Once, Deric Larson MD    dexAMETHasone (DECADRON) injection 10 mg, 10 mg, Intramuscular, Once, Deric Larson MD    Allergies:   Allergies   Allergen Reactions    Ace Inhibitors Other (See Comments)     Other reaction(s): cough    Cefuroxime GI Intolerance    Nitrofurantoin Other (See Comments)     GI INTOLERANCE       Objective     Physical Exam:  Vital Signs:   Vitals:    04/22/25 1403   BP: 138/60   BP Location: Left arm   Patient Position: Sitting   Cuff Size: Large Adult   Pulse: 79   SpO2: 97%   Weight: 112 kg (248 lb)   Height: 186.7 cm (73.5\")   PainSc: 3    PainLoc: Face     Body mass index is 32.28 kg/m².   Facility age limit for growth %juan is 20 years.    Physical Exam  Vitals and nursing note reviewed.   HENT:      Right Ear: A middle ear effusion is present.      Left Ear: A middle ear effusion is present.   Pulmonary:      Breath sounds: Examination of the right-middle field reveals rales. Rhonchi and rales present.         Procedures    PHQ-9 Total Score:      Assessment / Plan      Assessment/Plan:   Assessment & Plan  Lower respiratory tract infection    Orders:    XR Chest PA & Lateral; Future    dexAMETHasone (DECADRON) injection 10 mg    cefTRIAXone (ROCEPHIN) injection 1 g    azithromycin (ZITHROMAX) 500 MG tablet; Take 1 tablet by mouth Daily.    predniSONE (DELTASONE) 10 MG (21) dose pack; Use as directed on package    promethazine-dextromethorphan (PROMETHAZINE-DM) 6.25-15 MG/5ML syrup; Take 5 mL by mouth At Night As Needed for Cough.    Suspicious for pneumonia based on clinical exam.  Patient requested injections.  Will give Rocephin and Decadron in " office followed with azithromycin and prednisone taper.  Will obtain chest x-ray today.             Follow Up:   No follow-ups on file.      GLENN Larson MD  [facility]

## 2025-04-23 DIAGNOSIS — J22 LOWER RESPIRATORY TRACT INFECTION: ICD-10-CM

## 2025-04-24 RX ORDER — DEXTROMETHORPHAN HYDROBROMIDE AND PROMETHAZINE HYDROCHLORIDE 15; 6.25 MG/5ML; MG/5ML
5 SYRUP ORAL NIGHTLY PRN
Qty: 120 ML | Refills: 0 | OUTPATIENT
Start: 2025-04-24

## 2025-04-28 ENCOUNTER — RESULTS FOLLOW-UP (OUTPATIENT)
Dept: FAMILY MEDICINE CLINIC | Facility: CLINIC | Age: 80
End: 2025-04-28
Payer: MEDICARE

## 2025-05-02 RX ORDER — DEXTROMETHORPHN/ACETAMINOPH/CP 10-325-2MG
2 TABLET ORAL EVERY 4 HOURS PRN
Qty: 24 TABLET | Refills: 0 | Status: SHIPPED | OUTPATIENT
Start: 2025-05-02

## 2025-05-02 NOTE — TELEPHONE ENCOUNTER
Name: Werner Brower    Relationship: Self    Best Callback Number:      [phone] (Mobile)     HUB PROVIDED THE RELAY MESSAGE FROM THE OFFICE   PATIENT     HAS FURTHER QUESTIONS AND WOULD LIKE A CALL BACK    ADDITIONAL INFORMATION:     PATIENT REQUESTED A MEDICATION TO HELP WITH SINUS DRAINAGE SYMPTOM HE IS STILL EXPERIENCING

## 2025-05-02 NOTE — TELEPHONE ENCOUNTER
Called pt to let him know that Dr. Larson sent Varghese COHEN to the pharmacy for his sinus issues.

## 2025-07-01 ENCOUNTER — OFFICE VISIT (OUTPATIENT)
Dept: FAMILY MEDICINE CLINIC | Facility: CLINIC | Age: 80
End: 2025-07-01
Payer: MEDICARE

## 2025-07-01 VITALS
HEART RATE: 59 BPM | HEIGHT: 74 IN | OXYGEN SATURATION: 98 % | DIASTOLIC BLOOD PRESSURE: 64 MMHG | SYSTOLIC BLOOD PRESSURE: 128 MMHG | BODY MASS INDEX: 31.83 KG/M2 | WEIGHT: 248 LBS

## 2025-07-01 DIAGNOSIS — R35.1 BPH ASSOCIATED WITH NOCTURIA: ICD-10-CM

## 2025-07-01 DIAGNOSIS — R97.20 ELEVATED PSA: ICD-10-CM

## 2025-07-01 DIAGNOSIS — E66.09 CLASS 1 OBESITY DUE TO EXCESS CALORIES WITH SERIOUS COMORBIDITY AND BODY MASS INDEX (BMI) OF 32.0 TO 32.9 IN ADULT: ICD-10-CM

## 2025-07-01 DIAGNOSIS — I10 ESSENTIAL HYPERTENSION: ICD-10-CM

## 2025-07-01 DIAGNOSIS — N40.1 BPH ASSOCIATED WITH NOCTURIA: ICD-10-CM

## 2025-07-01 DIAGNOSIS — E78.5 HYPERLIPIDEMIA LDL GOAL <100: ICD-10-CM

## 2025-07-01 DIAGNOSIS — R73.9 HYPERGLYCEMIA: Primary | ICD-10-CM

## 2025-07-01 DIAGNOSIS — E66.811 CLASS 1 OBESITY DUE TO EXCESS CALORIES WITH SERIOUS COMORBIDITY AND BODY MASS INDEX (BMI) OF 32.0 TO 32.9 IN ADULT: ICD-10-CM

## 2025-07-01 PROBLEM — J20.9 ACUTE BRONCHITIS WITH BRONCHOSPASM: Status: RESOLVED | Noted: 2025-03-08 | Resolved: 2025-07-01

## 2025-07-01 LAB
EXPIRATION DATE: ABNORMAL
HBA1C MFR BLD: 5.7 % (ref 4.5–5.7)
Lab: ABNORMAL

## 2025-07-01 PROCEDURE — 3078F DIAST BP <80 MM HG: CPT | Performed by: FAMILY MEDICINE

## 2025-07-01 PROCEDURE — 3074F SYST BP LT 130 MM HG: CPT | Performed by: FAMILY MEDICINE

## 2025-07-01 PROCEDURE — 99214 OFFICE O/P EST MOD 30 MIN: CPT | Performed by: FAMILY MEDICINE

## 2025-07-01 PROCEDURE — 1160F RVW MEDS BY RX/DR IN RCRD: CPT | Performed by: FAMILY MEDICINE

## 2025-07-01 PROCEDURE — 1125F AMNT PAIN NOTED PAIN PRSNT: CPT | Performed by: FAMILY MEDICINE

## 2025-07-01 PROCEDURE — 3044F HG A1C LEVEL LT 7.0%: CPT | Performed by: FAMILY MEDICINE

## 2025-07-01 PROCEDURE — 83036 HEMOGLOBIN GLYCOSYLATED A1C: CPT | Performed by: FAMILY MEDICINE

## 2025-07-01 PROCEDURE — 1159F MED LIST DOCD IN RCRD: CPT | Performed by: FAMILY MEDICINE

## 2025-07-01 RX ORDER — CARVEDILOL 25 MG/1
25 TABLET ORAL 2 TIMES DAILY
Start: 2025-07-01

## 2025-07-01 RX ORDER — LOSARTAN POTASSIUM 100 MG/1
100 TABLET ORAL DAILY
Start: 2025-07-01

## 2025-07-01 RX ORDER — TAMSULOSIN HYDROCHLORIDE 0.4 MG/1
1 CAPSULE ORAL DAILY
Qty: 90 CAPSULE | Refills: 2 | Status: SHIPPED | OUTPATIENT
Start: 2025-07-01

## 2025-07-01 RX ORDER — ROSUVASTATIN CALCIUM 10 MG/1
10 TABLET, COATED ORAL DAILY
Qty: 90 TABLET | Refills: 2 | Status: SHIPPED | OUTPATIENT
Start: 2025-07-01

## 2025-07-01 NOTE — PROGRESS NOTES
"Chief Complaint  Hyperglycemia/prediabetes, HTN, Hyperlipidemia, BPH/Elevated PSA, and  L TKA October 2024    Subjective    History of Present Illness:  Werner Brower is a 79 y.o. male who presents today for followup visit and medication refills     Doing well after our visit together in Feb 2025.    Unfortunately, still has problems with range of motion and pain with his left knee after his left knee replacement in October 2024 with Dr. Gill.  He did complete additional therapy with [facility] and is doing well with improved ROM and stability after his PT with [facility].  No falls.    Recent labs did show worsening A1c up to 6.5 last visit.  He elected not to have medications started last visit.   Repeat A1c down to 5.7 today!     He did have an episode of volume overload with heart failure that resulted from his volume overload but his ejection fraction returned to normal after a solution of his volume overload episode.  His BNP returned normal after resolution of his acute volume overload.  He is following with Dr. Schmitt regarding his chronic diastolic heart failure.    KIRSTIN workup did show sleep apnea  -  he declines treatment and does understand this can contribute to worsening pulm HTN and diastolic CHF.      He continues on carvedilol, losartan, low-dose aspirin, and Crestor      He is following with urology given past PSA elevation.  Last visit with urology 6/30/25 with PSA down to 2.34.  He continues now on Flomax for BPH symptoms and he does continues with PSA monitoring with Urology (Dr Fried).         Objective   Vital Signs:   /64 (BP Location: Left arm, Patient Position: Sitting, Cuff Size: Large Adult)   Pulse 59   Ht 186.7 cm (73.5\")   Wt 112 kg (248 lb)   SpO2 98%   BMI 32.28 kg/m²     Review of Systems   Constitutional:  Negative for appetite change, chills and fever.   HENT:  Negative for hearing loss.    Eyes:  Negative for blurred vision.   Respiratory:  Negative for chest " tightness.    Cardiovascular:  Negative for chest pain.   Gastrointestinal:  Negative for abdominal pain.   Musculoskeletal:  Negative for gait problem.   Skin:  Negative for rash.   Neurological:  Positive for light-headedness.   Psychiatric/Behavioral:  Negative for depressed mood.        Past History:  Medical History: has a past medical history of Allergic (several years ago), Ankle sprain (), Arrhythmia, Arthritis, Back pain, BPH associated with nocturia, Cervical disc disorder, Colon polyp (several years ago), Condition not found, Dislocation of finger (), Erectile dysfunction (3 years ago), Essential hypertension, Gallbladder problem, Gonarthrosis, H/O colonoscopy, Heart murmur (many years ago), High risk medication use, History of circumcision, HL (hearing loss) (many years ago), Hyperlipidemia (many years ago), Knee sprain, Migraine headache, Neck strain (), Peptic ulceration, PONV (postoperative nausea and vomiting), RLS (restless legs syndrome), Rotator cuff syndrome, Skin problem, Sleep apnea, Urinary tract infection (several times 4 years ago), and Visual impairment (many years ago).   Surgical History: has a past surgical history that includes Cholecystectomy; Colonoscopy (2010); Elbow arthroscopy (Left); Circumcision, non-; Joint replacement (right knee ); Blepharoplasty; Total knee arthroplasty (Left, 10/09/2024); and Elbow surgery.   Family History: family history includes Hearing loss in his father and mother.   Social History: reports that he quit smoking about 49 years ago. His smoking use included cigarettes and cigars. He started smoking about 63 years ago. He has a 6.8 pack-year smoking history. He has been exposed to tobacco smoke. He has never used smokeless tobacco. He reports that he does not currently use alcohol. He reports that he does not use drugs.      Current Outpatient Medications:     ascorbic acid (QC Vitamin C with Loreta Hips) 500 MG tablet, Take 1  tablet by mouth 2 (Two) Times a Day., Disp: , Rfl:     aspirin 81 MG EC tablet, Take 1 tablet by mouth Daily., Disp: , Rfl:     carboxymethylcellulose (REFRESH PLUS) 0.5 % solution, Daily As Needed for Dry Eyes., Disp: , Rfl:     carvedilol (COREG) 25 MG tablet, Take 1 tablet by mouth 2 (Two) Times a Day., Disp: , Rfl:     losartan (COZAAR) 100 MG tablet, Take 1 tablet by mouth Daily., Disp: , Rfl:     Multiple Vitamins-Minerals (ICAPS AREDS 2 PO), Take  by mouth., Disp: , Rfl:     multivitamin with minerals (Multivitamin Men 50+) tablet tablet, Take 1 tablet by mouth Daily., Disp: , Rfl:     rosuvastatin (CRESTOR) 10 MG tablet, Take 1 tablet by mouth Daily., Disp: 90 tablet, Rfl: 2    tamsulosin (FLOMAX) 0.4 MG capsule 24 hr capsule, Take 1 capsule by mouth Daily., Disp: 90 capsule, Rfl: 2    Allergies: Ace inhibitors, Cefuroxime, and Nitrofurantoin    Physical Exam  Constitutional:       Appearance: He is obese.   HENT:      Head: Normocephalic.      Right Ear: External ear normal.      Left Ear: External ear normal.      Nose: Nose normal.   Eyes:      Pupils: Pupils are equal, round, and reactive to light.   Cardiovascular:      Rate and Rhythm: Normal rate and regular rhythm.      Heart sounds: Normal heart sounds.   Pulmonary:      Effort: Pulmonary effort is normal.      Breath sounds: Normal breath sounds.   Musculoskeletal:      Comments: Improved ROM after L TKA and phys therapy    Skin:     General: Skin is warm and dry.   Neurological:      General: No focal deficit present.      Mental Status: He is alert.   Psychiatric:         Mood and Affect: Mood normal.         Behavior: Behavior normal.         Thought Content: Thought content normal.          Result Review                   Assessment and Plan  Diagnoses and all orders for this visit:    1. Hyperglycemia (Primary)  Assessment & Plan:  A1c improved to 5.7 with diet, exercise and wt loss efforts    Will continue diet, exercise and wt loss efforts  and hold-off on meds    Recheck at followup in Nov or sooner if probs arise    Plans for A1c at followup and return to full fasting labs with his AWV thereafter     Orders:  -     POCT glycated hemoglobin, total    2. Essential hypertension  Assessment & Plan:  Hypertension is stable and controlled  Continue current treatment regimen.  Weight loss.  Regular aerobic exercise.  Blood pressure will be reassessed 4 mos.    Orders:  -     losartan (COZAAR) 100 MG tablet; Take 1 tablet by mouth Daily.  -     carvedilol (COREG) 25 MG tablet; Take 1 tablet by mouth 2 (Two) Times a Day.    3. Hyperlipidemia LDL goal <100  Assessment & Plan:   Lipid abnormalities are improving with treatment    Plan:  Continue same medication/s without change.      Discussed medication dosage, use, side effects, and goals of treatment in detail.    Counseled patient on lifestyle modifications to help control hyperlipidemia.     Patient Treatment Goals:   LDL goal is under 100    Followup at the next regular appointment.    Orders:  -     rosuvastatin (CRESTOR) 10 MG tablet; Take 1 tablet by mouth Daily.  Dispense: 90 tablet; Refill: 2    4. BPH associated with nocturia  Assessment & Plan:  Continues Flomax     Ongoing follow-up and PSA monitoring with urology (Dr. Fried).    Improved PSA with labs 6/30/25 with Urology at 2.34    Orders:  -     tamsulosin (FLOMAX) 0.4 MG capsule 24 hr capsule; Take 1 capsule by mouth Daily.  Dispense: 90 capsule; Refill: 2    5. Elevated PSA  Assessment & Plan:  Ongoing follow-up with urology.        6. Class 1 obesity due to excess calories with serious comorbidity and body mass index (BMI) of 32.0 to 32.9 in adult  Assessment & Plan:  Patient's (Body mass index is 32.28 kg/m².) indicates that they are obese (BMI >30) with health conditions that include hypertension, dyslipidemias, and osteoarthritis . Weight is improving with lifestyle modifications. BMI  is above average; BMI management plan is  completed. We discussed portion control and increasing exercise.                      Follow Up  Return in about 4 months (around 11/1/2025) for Med recheck.    Mathew Olivarez MD

## 2025-07-01 NOTE — ASSESSMENT & PLAN NOTE
A1c improved to 5.7 with diet, exercise and wt loss efforts    Will continue diet, exercise and wt loss efforts and hold-off on meds    Recheck at followup in Nov or sooner if probs arise    Plans for A1c at followup and return to full fasting labs with his AWV thereafter

## 2025-07-01 NOTE — ASSESSMENT & PLAN NOTE
Patient's (Body mass index is 32.28 kg/m².) indicates that they are obese (BMI >30) with health conditions that include hypertension, dyslipidemias, and osteoarthritis . Weight is improving with lifestyle modifications. BMI  is above average; BMI management plan is completed. We discussed portion control and increasing exercise.

## 2025-07-01 NOTE — ASSESSMENT & PLAN NOTE
Hypertension is stable and controlled  Continue current treatment regimen.  Weight loss.  Regular aerobic exercise.  Blood pressure will be reassessed 4 mos.

## 2025-07-01 NOTE — ASSESSMENT & PLAN NOTE
Continues Flomax     Ongoing follow-up and PSA monitoring with urology (Dr. Fried).    Improved PSA with labs 6/30/25 with Urology at 2.34

## 2025-07-01 NOTE — ASSESSMENT & PLAN NOTE
Congestive heart failure due to hypertension.  Heart failure is improving with treatment.  NYHA Class I.  Continue current treatment regimen.  Heart failure will be reassessed at next regular follow-up visit.

## (undated) DEVICE — PAD,ARMBOARD,CONV,FOAM,2X8X20",12PR/CS: Brand: MEDLINE

## (undated) DEVICE — SOL PVPI 10PCT 0.75OZ PEEL/PCH/PACKET 1P/U STRL

## (undated) DEVICE — DRSNG WND BORDR/ADHS NONADHR/GZ LF 4X4IN STRL

## (undated) DEVICE — UNDERCAST PADDING: Brand: DEROYAL

## (undated) DEVICE — UNDERGLV SURG BIOGEL INDICAT PI SZ8 BLU

## (undated) DEVICE — KT TRAK CHECKPOINT 1FEM/1TIB MAKO SS 1P/U STRL

## (undated) DEVICE — SUT VIC 1 CTX 36IN OBGYN VCP977H

## (undated) DEVICE — KT PROC KN MAKO VIZADISC TRACK 1P/U STRL

## (undated) DEVICE — PATIENT RETURN ELECTRODE, SINGLE-USE, CONTACT QUALITY MONITORING, ADULT, WITH 9FT CORD, FOR PATIENTS WEIGING OVER 33LBS. (15KG): Brand: MEGADYNE

## (undated) DEVICE — PK KN TOTL 10

## (undated) DEVICE — STRYKER PERFORMANCE SERIES SAGITTAL BLADE: Brand: STRYKER PERFORMANCE SERIES

## (undated) DEVICE — SYR LUERLOK 30CC

## (undated) DEVICE — TRAP FLD MINIVAC MEGADYNE 100ML

## (undated) DEVICE — TRY EPID SFTY 18G 3.5IN 1T7680

## (undated) DEVICE — ANTIBACTERIAL UNDYED BRAIDED (POLYGLACTIN 910), SYNTHETIC ABSORBABLE SUTURE: Brand: COATED VICRYL

## (undated) DEVICE — ELECTRD BLD EZ CLN STD 2.5IN

## (undated) DEVICE — PIN BONE MAKO PT 4X140MM SS 1P/U STRL

## (undated) DEVICE — DRESSING,GAUZE,XEROFORM,CURAD,1"X8",ST: Brand: CURAD

## (undated) DEVICE — ADHS SKIN PREMIERPRO EXOFIN TOPICAL HI/VISC .5ML

## (undated) DEVICE — DRSNG SURG AQUACEL AG/ADVNTGE 9X25CM 3.5X10IN

## (undated) DEVICE — BNDG ELAS CO-FLEX SLF ADHR 6IN 5YD LF STRL

## (undated) DEVICE — GLV SURG SENSICARE PI MIC PF SZ9 LF STRL

## (undated) DEVICE — Device

## (undated) DEVICE — PIN BONE MAKO PT 4X110MM SS 1P/U STRL

## (undated) DEVICE — SUT MONOCRYL PLS ANTIB UND 3/0  PS1 27IN

## (undated) DEVICE — GLV SURG SENSICARE PI ORTHO SZ8 LF STRL

## (undated) DEVICE — NEEDLE, QUINCKE 22GX3.5": Brand: MEDLINE INDUSTRIES, INC.

## (undated) DEVICE — TAPE,CLOTH/SILK,CURAD,3"X10YD,LF,40/CS: Brand: CURAD

## (undated) DEVICE — BLAD SAGITTAL MAKO STD

## (undated) DEVICE — BLANKT WARM UPPR/BDY ARM/OUT 57X196CM

## (undated) DEVICE — TB SXN FRAZIER 12F STRL

## (undated) DEVICE — KT PUMP INFUBLOCK MDL 2100 PMKITSOLIS

## (undated) DEVICE — CATHETER,URETHRAL,REDRUBBER,STERILE,22FR: Brand: MEDLINE

## (undated) DEVICE — BNDG ELAS W/CLIP 6IN 10YD LF STRL